# Patient Record
Sex: FEMALE | Race: BLACK OR AFRICAN AMERICAN | NOT HISPANIC OR LATINO
[De-identification: names, ages, dates, MRNs, and addresses within clinical notes are randomized per-mention and may not be internally consistent; named-entity substitution may affect disease eponyms.]

---

## 2021-07-24 ENCOUNTER — FORM ENCOUNTER (OUTPATIENT)
Age: 76
End: 2021-07-24

## 2021-07-24 ENCOUNTER — INPATIENT (INPATIENT)
Facility: HOSPITAL | Age: 76
LOS: 7 days | Discharge: OTHER INSTITUTION | DRG: 54 | End: 2021-08-01
Payer: MEDICARE

## 2021-07-24 VITALS
DIASTOLIC BLOOD PRESSURE: 73 MMHG | OXYGEN SATURATION: 95 % | HEART RATE: 89 BPM | WEIGHT: 175.93 LBS | RESPIRATION RATE: 18 BRPM | SYSTOLIC BLOOD PRESSURE: 134 MMHG

## 2021-07-24 DIAGNOSIS — I26.99 OTHER PULMONARY EMBOLISM WITHOUT ACUTE COR PULMONALE: ICD-10-CM

## 2021-07-24 LAB
ALBUMIN SERPL ELPH-MCNC: 2.9 G/DL — LOW (ref 3.3–5)
ALP SERPL-CCNC: 61 U/L — SIGNIFICANT CHANGE UP (ref 40–120)
ALT FLD-CCNC: 7 U/L — LOW (ref 10–45)
ANION GAP SERPL CALC-SCNC: 12 MMOL/L — SIGNIFICANT CHANGE UP (ref 5–17)
ANISOCYTOSIS BLD QL: SLIGHT — SIGNIFICANT CHANGE UP
APTT BLD: 26.9 SEC — LOW (ref 27.5–35.5)
AST SERPL-CCNC: 16 U/L — SIGNIFICANT CHANGE UP (ref 10–40)
BASOPHILS # BLD AUTO: 0 K/UL — SIGNIFICANT CHANGE UP (ref 0–0.2)
BASOPHILS NFR BLD AUTO: 0 % — SIGNIFICANT CHANGE UP (ref 0–2)
BILIRUB SERPL-MCNC: 0.4 MG/DL — SIGNIFICANT CHANGE UP (ref 0.2–1.2)
BUN SERPL-MCNC: 16 MG/DL — SIGNIFICANT CHANGE UP (ref 7–23)
CALCIUM SERPL-MCNC: 10 MG/DL — SIGNIFICANT CHANGE UP (ref 8.4–10.5)
CHLORIDE SERPL-SCNC: 109 MMOL/L — HIGH (ref 96–108)
CHOLEST SERPL-MCNC: 164 MG/DL — SIGNIFICANT CHANGE UP
CK SERPL-CCNC: 69 U/L — SIGNIFICANT CHANGE UP (ref 25–170)
CO2 SERPL-SCNC: 18 MMOL/L — LOW (ref 22–31)
CREAT SERPL-MCNC: 1.08 MG/DL — SIGNIFICANT CHANGE UP (ref 0.5–1.3)
EOSINOPHIL # BLD AUTO: 0 K/UL — SIGNIFICANT CHANGE UP (ref 0–0.5)
EOSINOPHIL NFR BLD AUTO: 0 % — SIGNIFICANT CHANGE UP (ref 0–6)
GLUCOSE SERPL-MCNC: 125 MG/DL — HIGH (ref 70–99)
HCT VFR BLD CALC: 36.1 % — SIGNIFICANT CHANGE UP (ref 34.5–45)
HDLC SERPL-MCNC: 29 MG/DL — LOW
HGB BLD-MCNC: 11.3 G/DL — LOW (ref 11.5–15.5)
INR BLD: 1.08 — SIGNIFICANT CHANGE UP (ref 0.88–1.16)
LIPID PNL WITH DIRECT LDL SERPL: 92 MG/DL — SIGNIFICANT CHANGE UP
LYMPHOCYTES # BLD AUTO: 1.51 K/UL — SIGNIFICANT CHANGE UP (ref 1–3.3)
LYMPHOCYTES # BLD AUTO: 13.2 % — SIGNIFICANT CHANGE UP (ref 13–44)
MAGNESIUM SERPL-MCNC: 2.2 MG/DL — SIGNIFICANT CHANGE UP (ref 1.6–2.6)
MANUAL SMEAR VERIFICATION: SIGNIFICANT CHANGE UP
MCHC RBC-ENTMCNC: 30 PG — SIGNIFICANT CHANGE UP (ref 27–34)
MCHC RBC-ENTMCNC: 31.3 GM/DL — LOW (ref 32–36)
MCV RBC AUTO: 95.8 FL — SIGNIFICANT CHANGE UP (ref 80–100)
METAMYELOCYTES # FLD: 2.6 % — HIGH (ref 0–0)
MONOCYTES # BLD AUTO: 1.1 K/UL — HIGH (ref 0–0.9)
MONOCYTES NFR BLD AUTO: 9.6 % — SIGNIFICANT CHANGE UP (ref 2–14)
NEUTROPHILS # BLD AUTO: 8.53 K/UL — HIGH (ref 1.8–7.4)
NEUTROPHILS NFR BLD AUTO: 73.7 % — SIGNIFICANT CHANGE UP (ref 43–77)
NEUTS BAND # BLD: 0.9 % — SIGNIFICANT CHANGE UP (ref 0–8)
NON HDL CHOLESTEROL: 135 MG/DL — HIGH
OVALOCYTES BLD QL SMEAR: SLIGHT — SIGNIFICANT CHANGE UP
PHOSPHATE SERPL-MCNC: 3 MG/DL — SIGNIFICANT CHANGE UP (ref 2.5–4.5)
PLAT MORPH BLD: NORMAL — SIGNIFICANT CHANGE UP
PLATELET # BLD AUTO: 182 K/UL — SIGNIFICANT CHANGE UP (ref 150–400)
POLYCHROMASIA BLD QL SMEAR: SLIGHT — SIGNIFICANT CHANGE UP
POTASSIUM SERPL-MCNC: 4.2 MMOL/L — SIGNIFICANT CHANGE UP (ref 3.5–5.3)
POTASSIUM SERPL-SCNC: 4.2 MMOL/L — SIGNIFICANT CHANGE UP (ref 3.5–5.3)
PROT SERPL-MCNC: 6 G/DL — SIGNIFICANT CHANGE UP (ref 6–8.3)
PROTHROM AB SERPL-ACNC: 12.9 SEC — SIGNIFICANT CHANGE UP (ref 10.6–13.6)
RBC # BLD: 3.77 M/UL — LOW (ref 3.8–5.2)
RBC # FLD: 13.8 % — SIGNIFICANT CHANGE UP (ref 10.3–14.5)
RBC BLD AUTO: ABNORMAL
SARS-COV-2 RNA SPEC QL NAA+PROBE: NEGATIVE — SIGNIFICANT CHANGE UP
SODIUM SERPL-SCNC: 139 MMOL/L — SIGNIFICANT CHANGE UP (ref 135–145)
SPHEROCYTES BLD QL SMEAR: SLIGHT — SIGNIFICANT CHANGE UP
TRIGL SERPL-MCNC: 213 MG/DL — HIGH
WBC # BLD: 11.44 K/UL — HIGH (ref 3.8–10.5)
WBC # FLD AUTO: 11.44 K/UL — HIGH (ref 3.8–10.5)

## 2021-07-24 PROCEDURE — 71045 X-RAY EXAM CHEST 1 VIEW: CPT | Mod: 26

## 2021-07-24 PROCEDURE — 70496 CT ANGIOGRAPHY HEAD: CPT | Mod: 26,MC

## 2021-07-24 PROCEDURE — 93010 ELECTROCARDIOGRAM REPORT: CPT

## 2021-07-24 PROCEDURE — 0042T: CPT

## 2021-07-24 PROCEDURE — 99223 1ST HOSP IP/OBS HIGH 75: CPT | Mod: GC

## 2021-07-24 PROCEDURE — 99285 EMERGENCY DEPT VISIT HI MDM: CPT | Mod: 25

## 2021-07-24 PROCEDURE — 71260 CT THORAX DX C+: CPT | Mod: 26,MC

## 2021-07-24 PROCEDURE — 70498 CT ANGIOGRAPHY NECK: CPT | Mod: 26,MC

## 2021-07-24 PROCEDURE — 70450 CT HEAD/BRAIN W/O DYE: CPT | Mod: 26,MC,59

## 2021-07-24 RX ORDER — ENOXAPARIN SODIUM 100 MG/ML
40 INJECTION SUBCUTANEOUS EVERY 24 HOURS
Refills: 0 | Status: DISCONTINUED | OUTPATIENT
Start: 2021-07-25 | End: 2021-07-25

## 2021-07-24 RX ORDER — OXYCODONE HYDROCHLORIDE 5 MG/1
10 TABLET ORAL ONCE
Refills: 0 | Status: DISCONTINUED | OUTPATIENT
Start: 2021-07-24 | End: 2021-07-24

## 2021-07-24 RX ORDER — GABAPENTIN 400 MG/1
1 CAPSULE ORAL
Qty: 0 | Refills: 0 | DISCHARGE

## 2021-07-24 RX ORDER — VALPROIC ACID (AS SODIUM SALT) 250 MG/5ML
500 SOLUTION, ORAL ORAL EVERY 12 HOURS
Refills: 0 | Status: DISCONTINUED | OUTPATIENT
Start: 2021-07-25 | End: 2021-07-25

## 2021-07-24 RX ORDER — ACETAMINOPHEN 500 MG
650 TABLET ORAL EVERY 6 HOURS
Refills: 0 | Status: DISCONTINUED | OUTPATIENT
Start: 2021-07-24 | End: 2021-08-01

## 2021-07-24 RX ORDER — MIRTAZAPINE 45 MG/1
1 TABLET, ORALLY DISINTEGRATING ORAL
Qty: 0 | Refills: 0 | DISCHARGE

## 2021-07-24 RX ORDER — VALPROIC ACID (AS SODIUM SALT) 250 MG/5ML
1500 SOLUTION, ORAL ORAL ONCE
Refills: 0 | Status: COMPLETED | OUTPATIENT
Start: 2021-07-24 | End: 2021-07-24

## 2021-07-24 RX ADMIN — Medication 650 MILLIGRAM(S): at 23:44

## 2021-07-24 RX ADMIN — Medication 32.5 MILLIGRAM(S): at 23:17

## 2021-07-24 RX ADMIN — OXYCODONE HYDROCHLORIDE 10 MILLIGRAM(S): 5 TABLET ORAL at 18:47

## 2021-07-24 RX ADMIN — OXYCODONE HYDROCHLORIDE 10 MILLIGRAM(S): 5 TABLET ORAL at 22:23

## 2021-07-24 NOTE — CONSULT NOTE ADULT - ASSESSMENT
Neuro  #CVA workup  - Continue aspirin 81mg and plavix 75mg daily  - Continue atorvastatin 80mg daily  - q4hr stroke neuro checks and vitals  - obtain MRI Brain with contrast  - Stroke Code HCT Results: No acute ICH or Transcortical infarction  - Stroke Code CTA Results:  - Stroke education  -Ptn is not eligible does not meet the criteria for TPa    #Possible Seizure likely due to brain mets with PMHx of seizures:   - Hold on Keppra  - Valproic Acid 20mg/kg loading dose  - Valproic Acid 500mg q8.   - EEG   Neuro  #CVA workup  - Continue aspirin 81mg and plavix 75mg daily  - Continue atorvastatin 80mg daily  - q4hr stroke neuro checks and vitals  - obtain MRI Brain with contrast  - Stroke Code HCT Results: No acute ICH or Transcortical infarction  - Stroke Code CTA Results:  - Stroke education  - Ptn does not meet the criteria for TPa    #Possible Seizure likely due to brain mets with PMHx of seizures:   - Hold on Keppra  - Valproic Acid 20mg/kg loading dose  - Valproic Acid 500mg q8.   - EEG   Neuro  Assessment: Pt is 75 with 75y Female with PMHx of Melanoma with mets to brain, and multiple organs, HTN, PE on Enoxaparin and seizure on Keppra secondary to brain mets, S/P craniotomy x2(June 2020, July 2021) with acute onset of weakness, stroke code activated, initial NIH 23, CTP and CTA unremarkable for acute ischemia or any LVO, symptoms less likely due to stroke therefore patient is not considered as candidate for TPA. Consider seizure secondary to malignancy mets, no need for stroke neurology evaluation at this time, recommend medicine and general neurology consult.   - Continue full dose Lovenox 40mg qd.   - q4hr general neurology checks and vitals.  - obtain MRI Brain with contrast to evaluate for brain mets.  - Hold on home Keppra dose  - Consider loading patient with Valproic Acid 20mg/kg for epilepsy  - Continue Valproic Acid 500mg q8h s/p loading dose.   - Recommend vEEG Pt is 75 with 75y Female with PMHx of Melanoma with mets to brain, and multiple organs, HTN, PE on Enoxaparin and seizure on Keppra secondary to brain mets, S/P craniotomy x2(June 2020, July 2021) with acute onset of weakness, stroke code activated, initial NIH 16, CTP and CTA unremarkable for acute ischemia or any LVO, symptoms less likely due to stroke therefore patient is not considered as candidate for TPA. Consider seizure secondary to malignancy mets, no need for stroke neurology evaluation at this time, recommend medicine and general neurology consult.     - Continue full dose Lovenox 40mg qd.   - q4hr general neurology checks and vitals.  - obtain MRI Brain with contrast to evaluate for brain mets.  - Hold on home Keppra dose  - Consider loading patient with Valproic Acid 20mg/kg for epilepsy  - Continue Valproic Acid 500mg q8h s/p loading dose.   - Recommend vEEG Pt is 75 with 75y Female with PMHx of Melanoma with mets to brain, and multiple organs, HTN, PE on Enoxaparin and seizure on Keppra secondary to brain mets, S/P craniotomy x2(June 2020, July 2021) with acute onset of weakness, stroke code activated, initial NIH 16, CTP and CTA unremarkable for acute ischemia or any LVO, symptoms less likely due to stroke therefore patient is not considered as candidate for TPA. Consider seizure secondary to malignancy mets, no need for stroke neurology evaluation at this time, recommend medicine and general neurology consult.     - Continue full dose Lovenox 40mg qd.   - q8hr general neurology checks and vitals.  - obtain MRI Brain with contrast to evaluate for brain mets.  - Hold on home Keppra dose  - Consider loading patient with Valproic Acid 20mg/kg for epilepsy  - Continue Valproic Acid 500mg q8h s/p loading dose.   - Recommend vEEG

## 2021-07-24 NOTE — H&P ADULT - PROBLEM SELECTOR PLAN 8
F: none  E: replete to K>4, Mg>2, Phos>2.5  N: Full liquids for now, Speech and swallow eval  Ppx: Lovenox    Code Status: Full, Emergency Contact: [phone], Daughter (Victor Hugo)    Dispo: [facility]

## 2021-07-24 NOTE — ED ADULT NURSE NOTE - INTERVENTIONS DEFINITIONS
Physically safe environment: no spills, clutter or unnecessary equipment/Provide visual cue, wrist band, yellow gown, etc.

## 2021-07-24 NOTE — ED ADULT TRIAGE NOTE - CHIEF COMPLAINT QUOTE
Patient brought in as notification for R/O stroke. Per daughter, patient was last seen at baseline at 8pm last night by her sister. Daughter reports today when she arrived to visit patient, she found patient to have left side weakness, slurred speech and left side facial droop Patient brought in as notification for R/O stroke. Per daughter, patient was last seen at baseline at 8pm last night by daughter. Daughter reports today when she arrived to visit patient, she found patient to have left side weakness, slurred speech and left side facial droop

## 2021-07-24 NOTE — ED ADULT NURSE NOTE - OBJECTIVE STATEMENT
75y female BIBA as notification stroke code. Pt accompanied by daughter. as per daughter, pt last known normal was @8pm last night. daughter went to go see mother today and noted slurred speech and R sided weakness. as per EMS, pt had L sided weakness when they saw pt? In ED, pt is A&0 x 0-1? did not state, name, month or year. states, "am I at [facility]?" As per daughter, pt has L sided weakness @ basline. hx of seizures, brain mets, 2 crainotomys.  Delay in CTA due to inability to obtain Iv access. ultrasound guided Iv obtained by Dr. Guillen @17:00. pt began to complain of CP and R Leg pain @16:45. Bilateral BP's L arm 134/74, 75y female BIBA as notification stroke code. Pt accompanied by daughter. as per daughter, pt last known normal was @8pm last night. daughter went to go see mother today and noted slurred speech and R sided weakness. as per EMS, pt had L sided weakness when they saw pt? In ED, pt is A&0 x 0-1? did not state, name, month or year. states, "am I at [facility]?" As per daughter, pt has L sided weakness @ baseline. hx of seizures, brain mets, 2 craniotomy's.  Delay in CTA due to inability to obtain IV access. ultrasound guided IV obtained by Dr. Guillen @17:00. pt began to complain of CP and R Leg pain @16:45. Bilateral BP's L arm 134/74, R Arm 114/68.

## 2021-07-24 NOTE — H&P ADULT - NSHPLABSRESULTS_GEN_ALL_CORE
LABS:                        11.3   11.44 )-----------( 182      ( 24 Jul 2021 17:03 )             36.1     07-24    139  |  109<H>  |  16  ----------------------------<  125<H>  4.2   |  18<L>  |  1.08    Ca    10.0      24 Jul 2021 17:03  Phos  3.0     07-24  Mg     2.2     07-24    TPro  6.0  /  Alb  2.9<L>  /  TBili  0.4  /  DBili  x   /  AST  16  /  ALT  7<L>  /  AlkPhos  61  07-24      PT/INR - ( 24 Jul 2021 17:03 )   PT: 12.9 sec;   INR: 1.08          PTT - ( 24 Jul 2021 17:03 )  PTT:26.9 sec  CAPILLARY BLOOD GLUCOSE  95 (24 Jul 2021 19:33)      POCT Blood Glucose.: 95 mg/dL (24 Jul 2021 16:29)      CARDIAC MARKERS ( 24 Jul 2021 17:03 )  x     / 0.03 ng/mL / 69 U/L / x     / x                          I & O Summary:      Microbiology:        RADIOLOGY, EKG AND ADDITIONAL TESTS: Reviewed. None

## 2021-07-24 NOTE — ED PROVIDER NOTE - CLINICAL SUMMARY MEDICAL DECISION MAKING FREE TEXT BOX
Pt with metastatic melanoma  - presents from NH for possible stroke: confusion, ? one sided weakness, neglect - code stroke called upon arrival but no evidence of acute stroke on imaging.  STROKE team feels possible seizures ? vs generalized weakness. Pt with pain in multiple different areas secondary to metastatic disease requiring oxycodone in ED.  No evidence of infection.  As per stroke, feel pt would be better managed on medicine team vs stroke team for general neuro consult for seizure rule out and for further pain management.

## 2021-07-24 NOTE — H&P ADULT - PROBLEM SELECTOR PLAN 7
F: none  E: replete to K>4, Mg>2, Phos>2.5  N: Full liquids for now, Speech and swallow eval  Ppx: Lovenox    Code Status: Full, Emergency Contact: [phone], Daughter (Victor Hugo)    Dispo: [facility] Home med: Amlodipine 5mg PO, labetalol 100mg po BID  Blood pressure on the lower end   Plan:   - HOLD amlodipine 5mg PO daily  - HOLD labetalol 100mg po BID

## 2021-07-24 NOTE — H&P ADULT - NSHPPHYSICALEXAM_GEN_ALL_CORE
VITAL SIGNS:  T(C): 36.8 (07-24-21 @ 17:42), Max: 36.8 (07-24-21 @ 17:42)  HR: 105 (07-24-21 @ 17:42) (65 - 105)  BP: 140/72 (07-24-21 @ 17:42) (121/90 - 140/72)  RR: 20 (07-24-21 @ 17:42) (18 - 20)  SpO2: 97% (07-24-21 @ 17:42) (95% - 97%)    PHYSICAL EXAM:  Constitutional: WDWN, comfortable in bed; NAD  Neurologic: AAOx3; CNII-XII grossly intact; no focal deficits  HEENT: NC/AT; clear conjunctiva, anicteric sclera; no nasal discharge; no oropharyngeal erythema or exudates, MMM  Neck: supple; no JVD or thyromegaly, no cervical, post-auricular or supraclavicular lymphadenopathy  Respiratory: CTA B/L, no wheezes/rales/rhonchi, no diminished breath sounds, no increased work of breathing or accessory muscle use  Cardiac: +S1/S2, no murmurs/rubs/gallops, RRR  Gastrointestinal: abdomen soft, NT/ND; no rebound or guarding, no hepatosplenomegaly; +BSx4  Genitourinary: no suprapubic tenderness, no CVA tenderness b/l  Back: spine midline, no bony tenderness or step-offs  Extremities: WWP, no clubbing or cyanosis; no peripheral edema b/l  Musculoskeletal: NROM x4; no joint swelling, tenderness or erythema  Vascular: 2+ radial, femoral, DP/PT pulses B/L  Dermatologic: skin warm, dry and intact; no rashes, wounds, or scars  Lymphatic: no submandibular or cervical LAD  Psychiatric: affect and characteristics of appearance, verbalizations, behaviors are appropriate VITAL SIGNS:  T(C): 36.8 (07-24-21 @ 17:42), Max: 36.8 (07-24-21 @ 17:42)  HR: 105 (07-24-21 @ 17:42) (65 - 105)  BP: 140/72 (07-24-21 @ 17:42) (121/90 - 140/72)  RR: 20 (07-24-21 @ 17:42) (18 - 20)  SpO2: 97% (07-24-21 @ 17:42) (95% - 97%)    PHYSICAL EXAM:  Constitutional: Elderly lady sleeping responds to her name and painful stimuli, protecting her airways  Neurologic: AAOx1; unable to assess, would refer to last Neuro note as the recent exam   HEENT: NC/AT; clear conjunctiva, anicteric sclera; no nasal discharge; no oropharyngeal erythema or exudates, MMM, crusted lesions noted on the forehead  Neck: supple; no JVD or thyromegaly, no cervical, post-auricular or supraclavicular lymphadenopathy  Respiratory: CTA B/L, no wheezes/rales/rhonchi, no diminished breath sounds, no increased work of breathing or accessory muscle use  Cardiac: +S1/S2, no murmurs/rubs/gallops, RRR  Gastrointestinal: abdomen soft, NT/ND; no rebound or guarding, no hepatosplenomegaly; +BSx4  Genitourinary: no suprapubic tenderness, no CVA tenderness b/l  Back: spine midline, no bony tenderness or step-offs  Extremities: WWP, no clubbing or cyanosis; no peripheral edema b/l  Musculoskeletal: NROM x4; no joint swelling, tenderness or erythema  Vascular: 2+ radial, femoral, DP/PT pulses B/L  Dermatologic: skin warm, dry and intact; no rashes, wounds, or scars  Lymphatic: no submandibular or cervical LAD  Psychiatric: affect and characteristics of appearance, verbalizations, behaviors are appropriate

## 2021-07-24 NOTE — H&P ADULT - HISTORY OF PRESENT ILLNESS
Patient Isabella Horowitz is a 75y old Female with PMHx of Melanoma with mets to brain, spine  and multiple organs, HTN, PE (on Enoxaparin), seizure on Keppra secondary to brain mets, S/P craniotomy x2(June 2020, July 2021), shingles (recently treated at [facility]), chronic back pain 2/2 mets who presented to the ED brought in by her daughter with concerns of left side weakness, altered mental sttus and left side facial droop. Daughter got concerned and stated that she brought her to the hospital from [facility] where she was discharged few days ago from [facility]. As per the daughter who was present on the bed side stated that her mother looked ]    iced the patient declined from her baseline and experiencing slurred speech with left facial droop and left sided weakness.   On arrival patient with R hemineglect with head turned toward left, NIHSS 16, she had altered level of consciousness facial palsy, left arm and B/L LE weakness, mild-mod loss of sensation and R hemineglect.   Pt is accompanied by her daughter, she stated her mother has L sided weakness at her baseline.   In ED patient complained of atypical chest pain, B/L LE pain, R hip pain.   History is limited, pending patient's daughter to bring documents from [facility].      Patient Isabella Horowitz is a 75y old Female with PMHx of Melanoma with mets to brain, spine  and multiple organs, HTN, PE (on Enoxaparin), seizure on Keppra secondary to brain mets, S/P craniotomy x2(June 2020, July 2021), shingles (recently treated at [facility]), chronic back pain 2/2 mets who presented to the ED brought in by her daughter with concerns of left side weakness, altered mental status and left side facial droop. Daughter got concerned and stated that she brought her to the hospital from [facility] where she was discharged few days ago from [facility]. As per the daughter who was present on the bed side stated that her mother looked different to her from her baseline which is she is able to talk and understand things going around her. Stroke called was called as per the chart review, on arrival patient with R hemineglect with head turned toward left, NIHSS 16, she had altered level of consciousness facial palsy, left arm and B/L LE weakness, mild-mod loss of sensation and R hemineglect. As per the daughter her mother recently back in April was found to have seizures with no jerking movement noticed and she went silent and started to stare the wall for few seconds, with no post-ictal confusion, tongue biting, loss of consciousness, bowel or bladder incontinence and she took the patient to [facility] where her neurologist diagnosed her with seizures and started her on Keppra. Otherwise ROS was limited due to patients medical condition as she was AAO x1 during my encounter in the ED    ED Course  Vitals: T:98.3 F  HR: 89- 105/min    BP: 125-140/90-72    RR: 20/min    SPO2: 97% on RA  Labs s/f: Sodium, Serum: 139, Potassium: 4.2, GFR if : 59, WBC Count: 11.44    Imaging: CT Chest w/ IV Cont : There is pulmonary metastatic disease and there are metastases involving left supraclavicular and bilateral axillary lymph nodes, in addition to soft tissue metastases in the shoulder musculature bilaterally.Low-density lesion right lobe of liver adjacent to infiltration of subcutaneous fat in the right lateral chest wall.Multinodular thyroid.Small right pleural effusion.  CT Head:  No acute intracranial hemorrhage or transcortical infarction. Status post right frontal craniotomy with subjacent right frontal lobe encephalomalacic changes.  CT Angio Neck w/ IV Cont: Multiple pulmonary metastases, Multiple soft tissue metastases, No acute vascular abnormality  EKG: NSR  ED Interventions: Oxycodone 10mg PO for back pain

## 2021-07-24 NOTE — ED ADULT NURSE REASSESSMENT NOTE - NS ED NURSE REASSESS COMMENT FT1
Po tylenol given for pain. pt passed water trial before receiving tylenol. pt had difficulty swallowing pills. Tylenol dissolving in mouth. team made aware that pt should be NPO at this time. VSS

## 2021-07-24 NOTE — ED ADULT NURSE REASSESSMENT NOTE - NS ED NURSE REASSESS COMMENT FT1
after initial report was give, pt bed was reassigned due to concern for shingles 2 weeks ago. bed currently unassigned. pt receiving consult @ bedside. VSS, see flow sheet. warm blanket provided. no open lesions noted.

## 2021-07-24 NOTE — H&P ADULT - ASSESSMENT
Patient Isabella Horowitz is a 75y old Female with PMHx of Melanoma with mets to brain, spine  and multiple organs, HTN, PE (on Enoxaparin), seizure on Keppra secondary to brain mets, S/P craniotomy x2(June 2020, July 2021), shingles (recently treated at [facility]), chronic back pain 2/2 mets who presented to the ED brought in by her daughter with concerns of left side weakness, altered mental status and left side facial droop. Given patients history of seizure recently possibly due to mets the presentation of the patient is possibly due to seizures, as stroke workup was done and is less likely the cause, however will need to follow up with MRI. Also her presentation could be due to polypharmacy as patient is on multiple medications for pain and anxiolytics. Patient is being admitted for monitoring and further management.     Patient Isabella Horowitz is a 75y old Female with PMHx of Melanoma with mets to brain, spine  and multiple organs, HTN, PE (on Enoxaparin), seizure on Keppra secondary to brain mets, S/P craniotomy x2(June 2020, July 2021), shingles (recently treated at [facility]), chronic back pain 2/2 mets who presented to the ED brought in by her daughter with concerns of left side weakness, altered mental status and left side facial droop. Given patients history of seizure recently possibly due to mets the presentation of the patient is possibly due to seizures, as stroke workup was done and is less likely the cause, however will need to follow up with MRI. Also her presentation could be due to polypharmacy as patient is on multiple medications for pain and anxiolytics. Patient is being admitted for monitoring and further management.

## 2021-07-24 NOTE — ED ADULT NURSE REASSESSMENT NOTE - NS ED NURSE REASSESS COMMENT FT1
pt daughter states, "im just upset that neurology saw her when I wasn't here and while she was asleep. I don't understand how they could get a real assessment on her like that." daughter reports she is frustrated waiting for the next step to get upstairs. daughter made aware of bed status delayed due to concern for shingles by [facility] nurse. depacon started. pt VSS. pt reports she is not in pain.

## 2021-07-24 NOTE — ED PROVIDER NOTE - NEUROLOGICAL, MLM
AAO x 0, no facial droop, mild expressive aphasia, b/l lower extremity weakness, right sided neglect, decreased sensation b/l

## 2021-07-24 NOTE — H&P ADULT - PROBLEM SELECTOR PLAN 5
and Depression  Home med: Mirtazepine 15mg PO qhs   Plan:   - c/w Mirtazepine 15mg PO qhs Home Med: Ramelteon 8mg QHS  Plan:   - c/w Ramelteon 8mg po qhs

## 2021-07-24 NOTE — H&P ADULT - PROBLEM SELECTOR PLAN 3
Recent history of PE  Home med: Lovenox 40mg SQ daily  Plan:   - c/w Lovenox 40mg SQ daily in the setting of mets to the spine  Home made: Oxycodone 10mg q4 PRN, gabapentin, fentanyl patch   Plan:   - would decrease the dose for now to Oxycodone 5mg PO q4 PRN given patient current condition  - c/w gabapentin 100mg BID PO   - Hold off on fentanyl patch for now   - Patient was given Tizanidine and Flexeril at [facility], would not start for now and monitor for now in the setting of mets to the spine  Home made: Oxycodone 10mg q4 PRN, gabapentin, fentanyl patch   Plan:   - would decrease the dose for now to Oxycodone 5mg PO q4 PRN given patient current condition, as patient is not able to swallow meds would give morphine 2mg IV q4 PRN for pain   - c/w gabapentin 100mg BID PO   - Hold off on fentanyl patch for now   - Patient was given Tizanidine and Flexeril at [facility], would not start for now and monitor for now in the setting of mets to the spine  Home made: Oxycodone 10mg q4 PRN, gabapentin, fentanyl patch   Plan:   - would decrease the dose for now to Oxycodone 5mg PO q4 PRN given patient current condition, as patient is not able to swallow meds would give morphine 2mg IV q4 PRN for pain   - c/w gabapentin 100mg BID PO   - Hold off on fentanyl patch for now   - Patient was given Tizanidine and Flexeril at [facility], would not start for now and monitor for now  - Palliative consult in AM Recent history of PE  Home med: Lovenox 40mg SQ daily  Plan:   - c/w Lovenox 40mg SQ daily    ADDENDUM: weight based lovenox dosing in setting of PE hx

## 2021-07-24 NOTE — ED ADULT NURSE NOTE - CHIEF COMPLAINT QUOTE
Patient brought in as notification for R/O stroke. Per daughter, patient was last seen at baseline at 8pm last night by her sister. Daughter reports today when she arrived to visit patient, she found patient to have left side weakness, slurred speech and left side facial droop

## 2021-07-24 NOTE — ED PROVIDER NOTE - OBJECTIVE STATEMENT
75y Female with PMHx of Melanoma with mets to brain, and multiple organs, HTN, PE on Enoxaparin and seizure secondary to brain mets, S/P craniotomy on June who presented to the ED with CC of left side weakness, slurred speech and left side facial droop.   Patient last known well time is 8pm July 23rd which her daughter visited her at nursing home. Today morning her daughter noticed the patient declined from her baseline and experiencing slurred speech with left facial droop and left sided weakness.   On arrival patient with R hemineglect with head turned toward left, NIHSS 23, she had altered level of consciousness facial palsy, left arm and B/L LE weakness, mild-mod loss of sensation and R hemineglect.   Pt is accompanied by her daughter, she stated her mother has L sided weakness at her baseline.   In ED patient complained of atypical chest pain, B/L LE pain, R hip pain.   History is limited, pending patient's daughter to bring documents from [facility].   CODE STROKE called prior to arrival. 75y Female with PMHx of Melanoma with mets to brain, and multiple organs, HTN, PE on Enoxaparin and seizure secondary to brain mets, S/P craniotomy on June who presented to the ED with CC of left side weakness, slurred speech and left side facial droop.   Patient last known well time is 8pm July 23rd which her daughter visited her at nursing home. Today morning her daughter noticed the patient declined from her baseline and experiencing slurred speech with left facial droop and left sided weakness.   On arrival patient with R hemineglect with head turned toward left, NIHSS 23, she had altered level of consciousness facial palsy, left arm and B/L LE weakness, mild-mod loss of sensation and R hemineglect.   Pt is accompanied by her daughter, she stated her mother has L sided weakness at her baseline.   In ED patient complained of atypical chest pain, B/L LE pain, R hip pain.   History is limited, pending patient's daughter to bring documents from [facility].   CODE STROKE called prior to arrival.  Pt recently admitted to [facility] for shingles requiring iv valtrex and then became generally weaker [facility]  Pt now presents from NH after being admitted < 24 hours.  Pt full code.

## 2021-07-24 NOTE — H&P ADULT - PROBLEM SELECTOR PLAN 2
possibly in the setting of metastasis to brain 2/2 melanoma; would r/o acute CVA; less likely metabolic encephalopathy   VSS  Mild Leucocytosis around 11 Recent history of PE  Home med: Lovenox 40mg SQ daily  Plan:   - c/w Lovenox 40mg SQ daily ADDENDUM:  pt w/ metastatic melanoma at multiple sites; poor prognosis, will likely need palliative consult, GOC discussion. obtain prior workup and treatment from [facility]

## 2021-07-24 NOTE — H&P ADULT - PROBLEM SELECTOR PLAN 4
in the setting of mets to the spine  Home made: Oxycodone 10mg q4 PRN, Home Med: Ramelteon 8mg QHS  Plan:   - c/w Ramelteon 8mg po qhs in the setting of mets to the spine  Home made: Oxycodone 10mg q4 PRN, gabapentin, fentanyl patch   Plan:   - would decrease the dose for now to Oxycodone 5mg PO q4 PRN given patient current condition, as patient is not able to swallow meds would give morphine 2mg IV q4 PRN for pain   - c/w gabapentin 100mg BID PO   - Hold off on fentanyl patch for now   - Patient was given Tizanidine and Flexeril at [facility], would not start for now and monitor for now  - Palliative consult in AM    ADDENDUM: monitor for opioid w/d , pt failed bedside dysphagia, keep NPO, transition PO oxycodone to morphine IV prn for now

## 2021-07-24 NOTE — H&P ADULT - PROBLEM SELECTOR PLAN 1
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 Multinodular thyroid.  CT Head:  No acute intracranial hemorrhage or transcortical infarction. Status post right frontal craniotomy with subjacent right frontal lobe encephalomalacic changes.  Plan:   - q4hr general neurology checks and vitals.  - f/u MRI Brain with contrast to evaluate for brain mets.  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 Multinodular thyroid.  CT Head:  No acute intracranial hemorrhage or transcortical infarction. Status post right frontal craniotomy with subjacent right frontal lobe encephalomalacic changes.  Plan:   - q4hr general neurology checks and vitals.  - f/u MRI Brain with contrast to evaluate for brain mets.  - Hold on home Keppra dose  - Loading dose of valproic acid 1500mg ordered  - c/w Valproic acid 500mg Q12   - f/u vEEG  - discussed with On call epileptologist  - Neuro consulted, f/u recs  - STAT CTH if change in mentation     ADDENDUM: obtain UA

## 2021-07-24 NOTE — H&P ADULT - PROBLEM SELECTOR PLAN 6
Home med: Amlodipine 5mg PO, labetalol 100mg po BID  Plan:   - c/w amlodipine 5mg PO daily  - c/w labetalol 100mg po BID Home med: Amlodipine 5mg PO, labetalol 100mg po BID  Blood pressure on the lower end   Plan:   - HOLD amlodipine 5mg PO daily  - HOLD labetalol 100mg po BID and Depression  Home med: Mirtazepine 15mg PO qhs   Plan:   - c/w Mirtazepine 15mg PO qhs

## 2021-07-24 NOTE — H&P ADULT - ATTENDING COMMENTS
reviewed pertinent data , h&p  patient seen and examined overnight     PE as above w/ following exceptions/ additions:       1.        rest of  plan as above reviewed pertinent data , h&p  patient seen and examined overnight     PE as above w/ following exceptions/ additions: elderly female asleep , awoken, nonverbal but moaning, minimally follows commands, perrla, mmm, s1s2, lungs CTA anteriorly b/l , abdomen soft/nt/nd, pt able to perform handgrip, raise arms mininally, able to raise left leg, unable to raise right leg       1. AMS: pt w/ worsening mental status symptoms in setting of metastatic melanoma. pending further neuro evaluation w/ MRI brain. keppra held and started on valproic acid. VEEG pending. will need palliative consult for GOC discussion. Of note, pt w/ fevers, worsening hypotension in AM requiring broad spectrum abx, blood ctxs, fluids, low threshold for ICU consult given code status.        rest of  plan as above

## 2021-07-24 NOTE — H&P ADULT - NSICDXPASTMEDICALHX_GEN_ALL_CORE_FT
PAST MEDICAL HISTORY:  Hypertension     Melanoma     Pulmonary embolism      PAST MEDICAL HISTORY:  Chronic back pain     Hypertension     Melanoma     Pulmonary embolism     Seizures     Shingles

## 2021-07-24 NOTE — CONSULT NOTE ADULT - SUBJECTIVE AND OBJECTIVE BOX
**STROKE CODE CONSULT NOTE**    Last known well time/Time of onset of symptoms: 8PM July 23rd.     HPI: 75y Female with PMHx of     T(C): 36.8 (07-24-21 @ 17:42), Max: 36.8 (07-24-21 @ 17:42)  HR: 105 (07-24-21 @ 17:42) (65 - 105)  BP: 140/72 (07-24-21 @ 17:42) (121/90 - 140/72)  RR: 20 (07-24-21 @ 17:42) (18 - 20)  SpO2: 97% (07-24-21 @ 17:42) (95% - 97%)    PAST MEDICAL & SURGICAL HISTORY:      FAMILY HISTORY:      SOCIAL HISTORY:   Patient lives with *** at ***.   Smoking status:  Drinking:  Drug Use:     ROS: ***  Constitutional: No fever, weight loss or fatigue  Eyes: No eye pain, visual disturbances, or discharge  ENMT:  No difficulty hearing, tinnitus; No sinus or throat pain  Neck: No pain or stiffness  Respiratory: No cough, wheezing, chills or hemoptysis  Cardiovascular: No chest pain, palpitations, shortness of breath, or leg swelling  Gastrointestinal: No abdominal pain. No nausea, vomiting or hematemesis; No diarrhea or constipation. Nohematochezia.  Genitourinary: No dysuria, frequency, hematuria or incontinence  Neurological: As per HPI  Skin: No itching, burning, rashes or lesions   Endocrine: No heat or cold intolerance; No hair loss  Musculoskeletal: No joint pain or swelling; No muscle, back or extremity pain  Heme/Lymph: No easy bruising or bleeding gums    MEDICATIONS  (STANDING):    MEDICATIONS  (PRN):    Allergies    crawfish (Anaphylaxis)  Keflex (Anaphylaxis)  Kiwi (Anaphylaxis)  penicillin (Anaphylaxis)  tetracycline (Anaphylaxis)    Intolerances      Vital Signs Last 24 Hrs  T(C): 36.8 (24 Jul 2021 17:42), Max: 36.8 (24 Jul 2021 17:42)  T(F): 98.3 (24 Jul 2021 17:42), Max: 98.3 (24 Jul 2021 17:42)  HR: 105 (24 Jul 2021 17:42) (65 - 105)  BP: 140/72 (24 Jul 2021 17:42) (121/90 - 140/72)  BP(mean): --  RR: 20 (24 Jul 2021 17:42) (18 - 20)  SpO2: 97% (24 Jul 2021 17:42) (95% - 97%)    Physical exam:  Constitutional: No acute distress, conversant  Eyes: Anicteric sclerae, moist conjunctivae, see below for CNs  Neck: trachea midline, FROM, supple, no thyromegaly or lymphadenopathy  Cardiovascular: Regular rate and rhythm, no murmurs, rubs, or gallops. No carotid bruits.   Pulmonary: Anterior breath sounds clear bilaterally, no crackles or wheezing. No use of accessory muscles  GI: Abdomen soft, non-distended, non-tender  Extremities: Radial and DP pulses +2, no edema    Neurologic:  -Mental status: Awake, alert, oriented to person, place, and time. Speech is fluent with intact naming, repetition, and comprehension, no dysarthria. Recent and remote memory intact. Follows commands. Attention/concentration intact. Fund of knowledge appropriate.  -Cranial nerves:   II: Visual fields are full to confrontation.  III, IV, VI: Extraocular movements are intact without nystagmus. Pupils equally round and reactive to light  V:  Facial sensation V1-V3 equal and intact   VII: Face is symmetric with normal eye closure and smile  VIII: Hearing is bilaterally intact to finger rub  IX, X: Uvula is midline and soft palate rises symmetrically  XI: Head turning and shoulder shrug are intact.  XII: Tongue protrudes midline  Motor: Normal bulk and tone. No pronator drift. Strength bilateral upper extremity 5/5, bilateral lower extremities 5/5.  Rapid alternating movements intact and symmetric  Sensation: Intact to light touch bilaterally. No neglect or extinction on double simultaneous testing.  Coordination: No dysmetria on finger-to-nose and heel-to-shin bilaterally  Reflexes: Downgoing toes bilaterally   Gait: Narrow gait and steady    NIHSS: **** ASPECT Score: ***** ICH Score: ****** (GCS)    Fingerstick Blood Glucose: CAPILLARY BLOOD GLUCOSE      POCT Blood Glucose.: 95 mg/dL (24 Jul 2021 16:29)    LABS:                        11.3   11.44 )-----------( 182      ( 24 Jul 2021 17:03 )             36.1     07-24    139  |  109<H>  |  16  ----------------------------<  125<H>  4.2   |  18<L>  |  1.08    Ca    10.0      24 Jul 2021 17:03    TPro  6.0  /  Alb  2.9<L>  /  TBili  0.4  /  DBili  x   /  AST  16  /  ALT  7<L>  /  AlkPhos  61  07-24    PT/INR - ( 24 Jul 2021 17:03 )   PT: 12.9 sec;   INR: 1.08          PTT - ( 24 Jul 2021 17:03 )  PTT:26.9 sec  CARDIAC MARKERS ( 24 Jul 2021 17:03 )  x     / 0.03 ng/mL / x     / x     / x              RADIOLOGY & ADDITIONAL STUDIES:      -----------------------------------------------------------------------------------------------------------------  IV-tPA (Y/N):    ***                              Bolus time:    Alteplase Dose Verification w/ RN:  Reason IV-tPA not given: ***    **STROKE CODE CONSULT NOTE**    Last known well time/Time of onset of symptoms: 8PM July 23rd.     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   Patient last known well time is 8pm July 23rd which her daughter visited her at nursing home. Today morning her daughter noticed the patient declined from her baseline and experiencing slurred speech with left facial droop and left sided weakness.   On arrival patient with R hemineglect with head turned toward left, NIHSS 23, she had altered level of consciousness facial palsy, left arm and B/L LE weakness, mild-mod loss of sensation and R hemineglect.   Pt is accompanied by her daughter, she stated her mother has L sided weakness at her baseline.   In ED patient complained of atypical chest pain, B/L LE pain, R hip pain.   History is limited, pending patient's daughter to bring documents from [facility].     T(C): 36.8 (07-24-21 @ 17:42), Max: 36.8 (07-24-21 @ 17:42)  HR: 105 (07-24-21 @ 17:42) (65 - 105)  BP: 140/72 (07-24-21 @ 17:42) (121/90 - 140/72)  RR: 20 (07-24-21 @ 17:42) (18 - 20)  SpO2: 97% (07-24-21 @ 17:42) (95% - 97%)    PAST MEDICAL & SURGICAL HISTORY:  Melanoma with mets.   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    Smoking status:  Drinking:  Drug Use:     ROS: ***  Constitutional: Generalized pain mostly over LE B/L. No fever, weight loss.   Eyes: vision impairment.   ENMT:  No difficulty hearing, tinnitus; No sinus or throat pain  Neck: No pain or stiffness  Respiratory: No cough, wheezing, chills or hemoptysis  Cardiovascular: Mild chest pain, B/L leg swelling R>L, no palpitations, shortness of breath.   Gastrointestinal: No abdominal pain. No nausea, vomiting or hematemesis; No diarrhea or constipation. Nohematochezia.  Genitourinary: No dysuria, frequency, hematuria or incontinence  Neurological: As per HPI  Skin: No itching, burning, rashes or lesions   Endocrine: No heat or cold intolerance; No hair loss  Musculoskeletal: multifocal muscle, back or extremity pain  Heme/Lymph: No easy bruising or bleeding gums    MEDICATIONS  (STANDING):  Home Meds:     MEDICATIONS  (PRN):    Allergies    crawfish (Anaphylaxis)  Keflex (Anaphylaxis)  Kiwi (Anaphylaxis)  penicillin (Anaphylaxis)  tetracycline (Anaphylaxis)    Intolerances      Vital Signs Last 24 Hrs  T(C): 36.8 (24 Jul 2021 17:42), Max: 36.8 (24 Jul 2021 17:42)  T(F): 98.3 (24 Jul 2021 17:42), Max: 98.3 (24 Jul 2021 17:42)  HR: 105 (24 Jul 2021 17:42) (65 - 105)  BP: 140/72 (24 Jul 2021 17:42) (121/90 - 140/72)  BP(mean): --  RR: 20 (24 Jul 2021 17:42) (18 - 20)  SpO2: 97% (24 Jul 2021 17:42) (95% - 97%)    Physical exam:  Constitutional: No acute distress, conversant  Eyes: Anicteric sclerae, moist conjunctivae, see below for CNs  Neck: trachea midline, FROM, supple, no thyromegaly or lymphadenopathy  Cardiovascular: Regular rate and rhythm, no murmurs, rubs, or gallops. No carotid bruits.   Pulmonary: Anterior breath sounds clear bilaterally, no crackles or wheezing. No use of accessory muscles  GI: Abdomen soft, non-distended, non-tender  Extremities: Radial and DP pulses +2, no edema    Neurologic:  -Mental status: Awake, alert, oriented to person, place, and time. Speech is fluent with intact naming, repetition, and comprehension, no dysarthria. Recent and remote memory intact. Follows commands. Attention/concentration intact. Fund of knowledge appropriate.  -Cranial nerves:   II: Visual fields are full to confrontation.  III, IV, VI: Extraocular movements are intact without nystagmus. Pupils equally round and reactive to light  V:  Facial sensation V1-V3 equal and intact   VII: Face is symmetric with normal eye closure and smile  VIII: Hearing is bilaterally intact to finger rub  IX, X: Uvula is midline and soft palate rises symmetrically  XI: Head turning and shoulder shrug are intact.  XII: Tongue protrudes midline  Motor: Normal bulk and tone. No pronator drift. Strength bilateral upper extremity 5/5, bilateral lower extremities 5/5.  Rapid alternating movements intact and symmetric  Sensation: Intact to light touch bilaterally. No neglect or extinction on double simultaneous testing.  Coordination: No dysmetria on finger-to-nose and heel-to-shin bilaterally  Reflexes: Downgoing toes bilaterally   Gait: Narrow gait and steady    NIHSS: **** ASPECT Score: ***** ICH Score: ****** (GCS)    Fingerstick Blood Glucose: CAPILLARY BLOOD GLUCOSE      POCT Blood Glucose.: 95 mg/dL (24 Jul 2021 16:29)    LABS:                        11.3   11.44 )-----------( 182      ( 24 Jul 2021 17:03 )             36.1     07-24    139  |  109<H>  |  16  ----------------------------<  125<H>  4.2   |  18<L>  |  1.08    Ca    10.0      24 Jul 2021 17:03    TPro  6.0  /  Alb  2.9<L>  /  TBili  0.4  /  DBili  x   /  AST  16  /  ALT  7<L>  /  AlkPhos  61  07-24    PT/INR - ( 24 Jul 2021 17:03 )   PT: 12.9 sec;   INR: 1.08          PTT - ( 24 Jul 2021 17:03 )  PTT:26.9 sec  CARDIAC MARKERS ( 24 Jul 2021 17:03 )  x     / 0.03 ng/mL / x     / x     / x              RADIOLOGY & ADDITIONAL STUDIES:      -----------------------------------------------------------------------------------------------------------------  IV-tPA (Y/N):    ***                              Bolus time:    Alteplase Dose Verification w/ RN:  Reason IV-tPA not given: ***    **STROKE CODE CONSULT NOTE**    Last known well time/Time of onset of symptoms: 8PM July 23rd.     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   Patient last known well time is 8pm July 23rd which her daughter visited her at nursing home. Today morning her daughter noticed the patient declined from her baseline and experiencing slurred speech with left facial droop and left sided weakness.   On arrival patient with R hemineglect with head turned toward left, NIHSS 23, she had altered level of consciousness facial palsy, left arm and B/L LE weakness, mild-mod loss of sensation and R hemineglect.   Pt is accompanied by her daughter, she stated her mother has L sided weakness at her baseline.   In ED patient complained of atypical chest pain, B/L LE pain, R hip pain.   History is limited, pending patient's daughter to bring documents from [facility].     T(C): 36.8 (07-24-21 @ 17:42), Max: 36.8 (07-24-21 @ 17:42)  HR: 105 (07-24-21 @ 17:42) (65 - 105)  BP: 140/72 (07-24-21 @ 17:42) (121/90 - 140/72)  RR: 20 (07-24-21 @ 17:42) (18 - 20)  SpO2: 97% (07-24-21 @ 17:42) (95% - 97%)    PAST MEDICAL & SURGICAL HISTORY:  Melanoma with mets.   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    Smoking status:  Drinking:  Drug Use:     ROS: ***  Constitutional: Generalized pain mostly over LE B/L. No fever, weight loss.   Eyes: vision impairment.   ENMT:  No difficulty hearing, tinnitus; No sinus or throat pain  Neck: No pain or stiffness  Respiratory: No cough, wheezing, chills or hemoptysis  Cardiovascular: Mild chest pain, B/L leg swelling R>L, no palpitations, shortness of breath.   Gastrointestinal: No abdominal pain. No nausea, vomiting or hematemesis; No diarrhea or constipation. Nohematochezia.  Genitourinary: No dysuria, frequency, hematuria or incontinence  Neurological: As per HPI  Skin: No itching, burning, rashes or lesions   Endocrine: No heat or cold intolerance; No hair loss  Musculoskeletal: multifocal muscle, back or extremity pain  Heme/Lymph: No easy bruising or bleeding gums    MEDICATIONS  (STANDING):  Home Meds:   Gabapentin  Oxycodone  Keppra 1000mg BID  Enoxaparin 40mg qd  Amlodipine 5mg qd  Labetalol 100 BID  Mirtazapine 15mg qhs  Ramelton 8mg qd  Tizanidine 2mg  MEDICATIONS  (PRN):    Allergies    crawfish (Anaphylaxis)  Keflex (Anaphylaxis)  Kiwi (Anaphylaxis)  penicillin (Anaphylaxis)  tetracycline (Anaphylaxis)    Intolerances      Vital Signs Last 24 Hrs  T(C): 36.8 (24 Jul 2021 17:42), Max: 36.8 (24 Jul 2021 17:42)  T(F): 98.3 (24 Jul 2021 17:42), Max: 98.3 (24 Jul 2021 17:42)  HR: 105 (24 Jul 2021 17:42) (65 - 105)  BP: 140/72 (24 Jul 2021 17:42) (121/90 - 140/72)  BP(mean): --  RR: 20 (24 Jul 2021 17:42) (18 - 20)  SpO2: 97% (24 Jul 2021 17:42) (95% - 97%)    Physical exam:  Constitutional: Mild-Mod distress, conversant  Eyes: Anicteric sclerae, moist conjunctivae, see below for CNs  Neck: trachea midline, FROM, supple, no thyromegaly or lymphadenopathy  Cardiovascular: Regular rate and rhythm, no murmurs, rubs, or gallops. No carotid bruits.   Pulmonary: Anterior breath sounds clear bilaterally, no crackles or wheezing. No use of accessory muscles  GI: Abdomen soft, non-distended, non-tender  Extremities: Radial and DP pulses +2, B/L LE 2+ edema    Neurologic:  -Mental status: Awake, alert, oriented to personOnly. Speech is not fluent with repetition, and comprehension, no dysarthria. Recent and remote memory intact. Follows commands. Attention/concentration intact. Fund of knowledge appropriate.  -Cranial nerves:   II: Visual fields are full to confrontation.  III, IV, VI: Extraocular movements are impaired. Without nystagmus. Pupils equally round and reactive to light.  V:  Facial sensation V1-V3 equal and intact on the Left side, Diminished on the R side.    VII: Face is symmetric with normal eye closure and smile.  VIII: Hearing is bilaterally intact to finger rub  IX, X: Uvula is midline and soft palate rises symmetrically  XI: Head turning toward left. Not able to do shoulder shrug.   XII: Tongue protrudes midline  Motor: bulk and tone decreased. Unable to perform pronator drift. Strength L upper extremity 2/5, RUE 0/5. bilateral lower extremities 0/5.  Was not able to assess Rapid alternating movements.  Sensation:deminished light touch R face. bilaterally. R side neglect or extinction on double simultaneous testing positive.   Coordination: Unable to assess for dysmetria on finger-to-nose and heel-to-shin.  Reflexes: Diminished babinski   Gait: Unable to walk or move carmelina.     NIHSS:23 ASPECT Score: 9.   Fingerstick Blood Glucose: CAPILLARY BLOOD GLUCOSE      POCT Blood Glucose.: 95 mg/dL (24 Jul 2021 16:29)    LABS:                        11.3   11.44 )-----------( 182      ( 24 Jul 2021 17:03 )             36.1     07-24    139  |  109<H>  |  16  ----------------------------<  125<H>  4.2   |  18<L>  |  1.08    Ca    10.0      24 Jul 2021 17:03    TPro  6.0  /  Alb  2.9<L>  /  TBili  0.4  /  DBili  x   /  AST  16  /  ALT  7<L>  /  AlkPhos  61  07-24    PT/INR - ( 24 Jul 2021 17:03 )   PT: 12.9 sec;   INR: 1.08          PTT - ( 24 Jul 2021 17:03 )  PTT:26.9 sec  CARDIAC MARKERS ( 24 Jul 2021 17:03 )  x     / 0.03 ng/mL / x     / x     / x              RADIOLOGY & ADDITIONAL STUDIES:      -----------------------------------------------------------------------------------------------------------------  IV-tPA (Y/N):    ***                              Bolus time:    Alteplase Dose Verification w/ RN:  Reason IV-tPA not given: ***    **STROKE CODE CONSULT NOTE**    Last known well time/Time of onset of symptoms: 8PM July 23rd.     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   Patient last known well time is 8pm July 23rd which her daughter visited her at nursing home. Today morning her daughter noticed the patient declined from her baseline and experiencing slurred speech with left facial droop and left sided weakness.   On arrival patient with R hemineglect with head turned toward left, NIHSS 23, she had altered level of consciousness facial palsy, left arm and B/L LE weakness, mild-mod loss of sensation and R hemineglect.   Pt is accompanied by her daughter, she stated her mother has L sided weakness at her baseline.   In ED patient complained of atypical chest pain, B/L LE pain, R hip pain.   History is limited, pending patient's daughter to bring documents from [facility].     T(C): 36.8 (07-24-21 @ 17:42), Max: 36.8 (07-24-21 @ 17:42)  HR: 105 (07-24-21 @ 17:42) (65 - 105)  BP: 140/72 (07-24-21 @ 17:42) (121/90 - 140/72)  RR: 20 (07-24-21 @ 17:42) (18 - 20)  SpO2: 97% (07-24-21 @ 17:42) (95% - 97%)    PAST MEDICAL & SURGICAL HISTORY:  Melanoma with mets.   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     ROS: ***  Constitutional: Generalized pain mostly over LE B/L. No fever, weight loss.   Eyes: vision impairment.   ENMT:  No difficulty hearing, tinnitus; No sinus or throat pain  Neck: No pain or stiffness  Respiratory: No cough, wheezing, chills or hemoptysis  Cardiovascular: Mild chest pain, B/L leg swelling R>L, no palpitations, shortness of breath.   Gastrointestinal: No abdominal pain. No nausea, vomiting or hematemesis; No diarrhea or constipation. Nohematochezia.  Genitourinary: No dysuria, frequency, hematuria or incontinence  Neurological: As per HPI  Skin: No itching, burning, rashes or lesions   Endocrine: No heat or cold intolerance; No hair loss  Musculoskeletal: multifocal muscle, back or extremity pain  Heme/Lymph: No easy bruising or bleeding gums    MEDICATIONS  (STANDING):  Home Meds:   Gabapentin  Oxycodone  Keppra 1000mg BID  Enoxaparin 40mg qd  Amlodipine 5mg qd  Labetalol 100 BID  Mirtazapine 15mg qhs  Ramelton 8mg qd  Tizanidine 2mg  MEDICATIONS  (PRN):    Allergies    crawfish (Anaphylaxis)  Keflex (Anaphylaxis)  Kiwi (Anaphylaxis)  penicillin (Anaphylaxis)  tetracycline (Anaphylaxis)    Intolerances      Vital Signs Last 24 Hrs  T(C): 36.8 (24 Jul 2021 17:42), Max: 36.8 (24 Jul 2021 17:42)  T(F): 98.3 (24 Jul 2021 17:42), Max: 98.3 (24 Jul 2021 17:42)  HR: 105 (24 Jul 2021 17:42) (65 - 105)  BP: 140/72 (24 Jul 2021 17:42) (121/90 - 140/72)  BP(mean): --  RR: 20 (24 Jul 2021 17:42) (18 - 20)  SpO2: 97% (24 Jul 2021 17:42) (95% - 97%)    Physical exam:  Constitutional: Mild-Mod distress, conversant  Eyes: Anicteric sclerae, moist conjunctivae, see below for CNs  Neck: trachea midline, FROM, supple, no thyromegaly or lymphadenopathy  Cardiovascular: Regular rate and rhythm, no murmurs, rubs, or gallops. No carotid bruits.   Pulmonary: Anterior breath sounds clear bilaterally, no crackles or wheezing. No use of accessory muscles  GI: Abdomen soft, non-distended, non-tender  Extremities: Radial and DP pulses +2, B/L LE 2+ edema    Neurologic:  -Mental status: Awake, alert, oriented to personOnly. Speech is not fluent with repetition, and comprehension, no dysarthria. Recent and remote memory intact. Follows commands. Attention/concentration intact. Fund of knowledge appropriate.  -Cranial nerves:   II: Visual fields are full to confrontation.  III, IV, VI: Extraocular movements are impaired. Without nystagmus. Pupils equally round and reactive to light.  V:  Facial sensation V1-V3 equal and intact on the Left side, Diminished on the R side.    VII: Face is symmetric with normal eye closure and smile.  VIII: Hearing is bilaterally intact to finger rub  IX, X: Uvula is midline and soft palate rises symmetrically  XI: Head turning toward left. Not able to do shoulder shrug.   XII: Tongue protrudes midline  Motor: bulk and tone decreased. Unable to perform pronator drift. Strength L upper extremity 2/5, RUE 0/5. bilateral lower extremities 0/5.  Was not able to assess Rapid alternating movements.  Sensation:deminished light touch R face. bilaterally. R side neglect or extinction on double simultaneous testing positive.   Coordination: Unable to assess for dysmetria on finger-to-nose and heel-to-shin.  Reflexes: Diminished babinski   Gait: Unable to walk or move carmelina.     NIHSS:23 ASPECT Score: 9.   Fingerstick Blood Glucose: CAPILLARY BLOOD GLUCOSE      POCT Blood Glucose.: 95 mg/dL (24 Jul 2021 16:29)    LABS:                        11.3   11.44 )-----------( 182      ( 24 Jul 2021 17:03 )             36.1     07-24    139  |  109<H>  |  16  ----------------------------<  125<H>  4.2   |  18<L>  |  1.08    Ca    10.0      24 Jul 2021 17:03    TPro  6.0  /  Alb  2.9<L>  /  TBili  0.4  /  DBili  x   /  AST  16  /  ALT  7<L>  /  AlkPhos  61  07-24    PT/INR - ( 24 Jul 2021 17:03 )   PT: 12.9 sec;   INR: 1.08          PTT - ( 24 Jul 2021 17:03 )  PTT:26.9 sec  CARDIAC MARKERS ( 24 Jul 2021 17:03 )  x     / 0.03 ng/mL / x     / x     / x              RADIOLOGY & ADDITIONAL STUDIES:      -----------------------------------------------------------------------------------------------------------------  IV-tPA (Y/N):    ***                              Bolus time:    Alteplase Dose Verification w/ RN:  Reason IV-tPA not given: ***    **STROKE CODE CONSULT NOTE**    Last known well time/Time of onset of symptoms: 8PM July 23rd.     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   Patient last known well time is 8pm July 23rd which her daughter visited her at nursing home. Today morning her daughter noticed the patient declined from her baseline and experiencing slurred speech with left facial droop and left sided weakness.   On arrival patient with R hemineglect with head turned toward left, NIHSS 23, she had altered level of consciousness facial palsy, left arm and B/L LE weakness, mild-mod loss of sensation and R hemineglect.   Pt is accompanied by her daughter, she stated her mother has L sided weakness at her baseline.   In ED patient complained of atypical chest pain, B/L LE pain, R hip pain.   History is limited, pending patient's daughter to bring documents from [facility].     T(C): 36.8 (07-24-21 @ 17:42), Max: 36.8 (07-24-21 @ 17:42)  HR: 105 (07-24-21 @ 17:42) (65 - 105)  BP: 140/72 (07-24-21 @ 17:42) (121/90 - 140/72)  RR: 20 (07-24-21 @ 17:42) (18 - 20)  SpO2: 97% (07-24-21 @ 17:42) (95% - 97%)    PAST MEDICAL & SURGICAL HISTORY:  Melanoma with mets.   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     ROS: ***  Constitutional: Generalized pain mostly over LE B/L. No fever, weight loss.   Eyes: vision impairment.   ENMT:  No difficulty hearing, tinnitus; No sinus or throat pain  Neck: No pain or stiffness  Respiratory: No cough, wheezing, chills or hemoptysis  Cardiovascular: Mild chest pain, B/L leg swelling R>L, no palpitations, shortness of breath.   Gastrointestinal: No abdominal pain. No nausea, vomiting or hematemesis; No diarrhea or constipation. Nohematochezia.  Genitourinary: No dysuria, frequency, hematuria or incontinence  Neurological: As per HPI  Skin: No itching, burning, rashes or lesions   Endocrine: No heat or cold intolerance; No hair loss  Musculoskeletal: multifocal muscle, back or extremity pain  Heme/Lymph: No easy bruising or bleeding gums    MEDICATIONS  (STANDING):  Home Meds:   Gabapentin  Oxycodone  Keppra 1000mg BID  Enoxaparin 40mg qd  Amlodipine 5mg qd  Labetalol 100 BID  Mirtazapine 15mg qhs  Ramelton 8mg qd  Tizanidine 2mg  MEDICATIONS  (PRN):    Allergies    crawfish (Anaphylaxis)  Keflex (Anaphylaxis)  Kiwi (Anaphylaxis)  penicillin (Anaphylaxis)  tetracycline (Anaphylaxis)    Intolerances      Vital Signs Last 24 Hrs  T(C): 36.8 (24 Jul 2021 17:42), Max: 36.8 (24 Jul 2021 17:42)  T(F): 98.3 (24 Jul 2021 17:42), Max: 98.3 (24 Jul 2021 17:42)  HR: 105 (24 Jul 2021 17:42) (65 - 105)  BP: 140/72 (24 Jul 2021 17:42) (121/90 - 140/72)  BP(mean): --  RR: 20 (24 Jul 2021 17:42) (18 - 20)  SpO2: 97% (24 Jul 2021 17:42) (95% - 97%)    Physical exam:  Constitutional: Mild-Mod distress, conversant  Eyes: Anicteric sclerae, moist conjunctivae, see below for CNs  Neck: trachea midline, FROM, supple, no thyromegaly or lymphadenopathy  Cardiovascular: Regular rate and rhythm, no murmurs, rubs, or gallops. No carotid bruits.   Pulmonary: Anterior breath sounds clear bilaterally, no crackles or wheezing. No use of accessory muscles  GI: Abdomen soft, non-distended, non-tender  Extremities: Radial and DP pulses +2, B/L LE 2+ edema    Neurologic:  -Mental status: Awake, alert, oriented to personOnly. Speech is not fluent with repetition, and comprehension, no dysarthria. Recent and remote memory intact. Follows commands. Attention/concentration intact. Fund of knowledge appropriate.  -Cranial nerves:   II: Visual fields are full to confrontation.  III, IV, VI: Extraocular movements are impaired. Without nystagmus. Pupils equally round and reactive to light.  V:  Facial sensation V1-V3 equal and intact on the Left side, Diminished on the R side.    VII: Face is symmetric with normal eye closure and smile.  VIII: Hearing is bilaterally intact to finger rub  IX, X: Uvula is midline and soft palate rises symmetrically  XI: Head turning toward left. Not able to do shoulder shrug.   XII: Tongue protrudes midline  Motor: bulk and tone decreased. Unable to perform pronator drift. Strength L upper extremity 2/5, RUE 0/5. bilateral lower extremities 0/5.  Was not able to assess Rapid alternating movements.  Sensation:deminished light touch R face. bilaterally. R side neglect or extinction on double simultaneous testing positive.   Coordination: Unable to assess for dysmetria on finger-to-nose and heel-to-shin.  Reflexes: Diminished babinski   Gait: Unable to walk or move carmelina.     NIHSS:23   ASPECT Score: 9.   Fingerstick Blood Glucose: CAPILLARY BLOOD GLUCOSE      POCT Blood Glucose.: 95 mg/dL (24 Jul 2021 16:29)    LABS:                        11.3   11.44 )-----------( 182      ( 24 Jul 2021 17:03 )             36.1     07-24    139  |  109<H>  |  16  ----------------------------<  125<H>  4.2   |  18<L>  |  1.08    Ca    10.0      24 Jul 2021 17:03    TPro  6.0  /  Alb  2.9<L>  /  TBili  0.4  /  DBili  x   /  AST  16  /  ALT  7<L>  /  AlkPhos  61  07-24    PT/INR - ( 24 Jul 2021 17:03 )   PT: 12.9 sec;   INR: 1.08          PTT - ( 24 Jul 2021 17:03 )  PTT:26.9 sec  CARDIAC MARKERS ( 24 Jul 2021 17:03 )  x     / 0.03 ng/mL / x     / x     / x              RADIOLOGY & ADDITIONAL STUDIES:  CT Head:   No acute intracranial hemorrhage or transcortical infarction.    Status post right frontal craniotomy with subjacent right frontal lobe encephalomalacic changes.    -----------------------------------------------------------------------------------------------------------------  IV-tPA (Y/N):   N                              Bolus time:    Alteplase Dose Verification w/ RN: N/A  Reason IV-tPA not given: Not given    **STROKE CODE CONSULT NOTE**    Last known well time/Time of onset of symptoms: 8PM July 23rd.     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   Patient last known well time is 8pm July 23rd which her daughter visited her at nursing home. Today afternoon at 14:30 her daughter noticed the patient declined from her baseline and experiencing slurred speech with left facial droop and left sided weakness.   On arrival patient with R hemineglect with head turned toward left, NIHSS 23, she had altered level of consciousness facial palsy, left arm and B/L LE weakness, mild-mod loss of sensation and R hemineglect.   Pt is accompanied by her daughter, she stated her mother has L sided weakness at her baseline.   In ED patient complained of atypical chest pain, B/L LE pain, R hip pain.   History is limited, pending patient's daughter to bring documents from [facility].     T(C): 36.8 (07-24-21 @ 17:42), Max: 36.8 (07-24-21 @ 17:42)  HR: 105 (07-24-21 @ 17:42) (65 - 105)  BP: 140/72 (07-24-21 @ 17:42) (121/90 - 140/72)  RR: 20 (07-24-21 @ 17:42) (18 - 20)  SpO2: 97% (07-24-21 @ 17:42) (95% - 97%)    PAST MEDICAL & SURGICAL HISTORY:  Melanoma with mets.   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     ROS: ***  Constitutional: Generalized pain mostly over LE B/L. No fever, weight loss.   Eyes: vision impairment.   ENMT:  No difficulty hearing, tinnitus; No sinus or throat pain  Neck: No pain or stiffness  Respiratory: No cough, wheezing, chills or hemoptysis  Cardiovascular: Mild chest pain, B/L leg swelling R>L, no palpitations, shortness of breath.   Gastrointestinal: No abdominal pain. No nausea, vomiting or hematemesis; No diarrhea or constipation. Nohematochezia.  Genitourinary: No dysuria, frequency, hematuria or incontinence  Neurological: As per HPI  Skin: No itching, burning, rashes or lesions   Endocrine: No heat or cold intolerance; No hair loss  Musculoskeletal: multifocal muscle, back or extremity pain  Heme/Lymph: No easy bruising or bleeding gums    MEDICATIONS  (STANDING):  Home Meds:   Gabapentin  Oxycodone  Keppra 1000mg BID  Enoxaparin 40mg qd  Amlodipine 5mg qd  Labetalol 100 BID  Mirtazapine 15mg qhs  Ramelton 8mg qd  Tizanidine 2mg  MEDICATIONS  (PRN):    Allergies    crawfish (Anaphylaxis)  Keflex (Anaphylaxis)  Kiwi (Anaphylaxis)  penicillin (Anaphylaxis)  tetracycline (Anaphylaxis)    Intolerances      Vital Signs Last 24 Hrs  T(C): 36.8 (24 Jul 2021 17:42), Max: 36.8 (24 Jul 2021 17:42)  T(F): 98.3 (24 Jul 2021 17:42), Max: 98.3 (24 Jul 2021 17:42)  HR: 105 (24 Jul 2021 17:42) (65 - 105)  BP: 140/72 (24 Jul 2021 17:42) (121/90 - 140/72)  BP(mean): --  RR: 20 (24 Jul 2021 17:42) (18 - 20)  SpO2: 97% (24 Jul 2021 17:42) (95% - 97%)    Physical exam:  Constitutional: Mild-Mod distress, conversant  Eyes: Anicteric sclerae, moist conjunctivae, see below for CNs  Neck: trachea midline, FROM, supple, no thyromegaly or lymphadenopathy  Cardiovascular: Regular rate and rhythm, no murmurs, rubs, or gallops. No carotid bruits.   Pulmonary: Anterior breath sounds clear bilaterally, no crackles or wheezing. No use of accessory muscles  GI: Abdomen soft, non-distended, non-tender  Extremities: Radial and DP pulses +2, B/L LE 2+ edema    Neurologic:  -Mental status: Awake, alert, oriented to personOnly. Speech is not fluent with repetition, and comprehension, no dysarthria. Recent and remote memory intact. Follows commands. Attention/concentration intact. Fund of knowledge appropriate.  -Cranial nerves:   II: Visual fields are full to confrontation.  III, IV, VI: Extraocular movements are impaired. Without nystagmus. Pupils equally round and reactive to light.  V:  Facial sensation V1-V3 equal and intact on the Left side, Diminished on the R side.    VII: Face is symmetric with normal eye closure and smile.  VIII: Hearing is bilaterally intact to finger rub  IX, X: Uvula is midline and soft palate rises symmetrically  XI: Head turning toward left. Not able to do shoulder shrug.   XII: Tongue protrudes midline  Motor: bulk and tone decreased. Unable to perform pronator drift. Strength L upper extremity 2/5, RUE 0/5. bilateral lower extremities 0/5.  Was not able to assess Rapid alternating movements.  Sensation:deminished light touch R face. bilaterally. R side neglect or extinction on double simultaneous testing positive.   Coordination: Unable to assess for dysmetria on finger-to-nose and heel-to-shin.  Reflexes: Diminished babinski   Gait: Unable to walk or move carmelina.     NIHSS:23   ASPECT Score: 9.   Fingerstick Blood Glucose: CAPILLARY BLOOD GLUCOSE      POCT Blood Glucose.: 95 mg/dL (24 Jul 2021 16:29)    LABS:                        11.3   11.44 )-----------( 182      ( 24 Jul 2021 17:03 )             36.1     07-24    139  |  109<H>  |  16  ----------------------------<  125<H>  4.2   |  18<L>  |  1.08    Ca    10.0      24 Jul 2021 17:03    TPro  6.0  /  Alb  2.9<L>  /  TBili  0.4  /  DBili  x   /  AST  16  /  ALT  7<L>  /  AlkPhos  61  07-24    PT/INR - ( 24 Jul 2021 17:03 )   PT: 12.9 sec;   INR: 1.08          PTT - ( 24 Jul 2021 17:03 )  PTT:26.9 sec  CARDIAC MARKERS ( 24 Jul 2021 17:03 )  x     / 0.03 ng/mL / x     / x     / x              RADIOLOGY & ADDITIONAL STUDIES:  CT Head:   No acute intracranial hemorrhage or transcortical infarction.    Status post right frontal craniotomy with subjacent right frontal lobe encephalomalacic changes.    -----------------------------------------------------------------------------------------------------------------  IV-tPA (Y/N):   N                              Bolus time:    Alteplase Dose Verification w/ RN: N/A  Reason IV-tPA not given: Not given    **STROKE CODE CONSULT NOTE**    Last known well time/Time of onset of symptoms: 8PM July 23rd.     HPI: 75y Female with PMHx of Melanoma with mets to brain, and multiple organs, HTN, PE on Enoxaparin and seizure on Keppra secondary to brain mets, S/P craniotomy x2(June 2020, July 2021)who presented to the ED with CC of left side weakness, slurred speech and left side facial droop.   Patient last known well time is 8pm July 23rd which her daughter visited her at nursing home. Today afternoon at 14:30 her daughter noticed the patient declined from her baseline and experiencing slurred speech with left facial droop and left sided weakness.   On arrival patient with R hemineglect with head turned toward left, NIHSS 23, she had altered level of consciousness facial palsy, left arm and B/L LE weakness, mild-mod loss of sensation and R hemineglect.   Pt is accompanied by her daughter, she stated her mother has L sided weakness at her baseline.   In ED patient complained of atypical chest pain, B/L LE pain, R hip pain.   History is limited, pending patient's daughter to bring documents from [facility].     T(C): 36.8 (07-24-21 @ 17:42), Max: 36.8 (07-24-21 @ 17:42)  HR: 105 (07-24-21 @ 17:42) (65 - 105)  BP: 140/72 (07-24-21 @ 17:42) (121/90 - 140/72)  RR: 20 (07-24-21 @ 17:42) (18 - 20)  SpO2: 97% (07-24-21 @ 17:42) (95% - 97%)    PAST MEDICAL & SURGICAL HISTORY:  Melanoma with mets.   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     ROS: ***  Constitutional: Generalized pain mostly over LE B/L. No fever, weight loss.   Eyes: vision impairment.   ENMT:  No difficulty hearing, tinnitus; No sinus or throat pain  Neck: No pain or stiffness  Respiratory: No cough, wheezing, chills or hemoptysis  Cardiovascular: Mild chest pain, B/L leg swelling R>L, no palpitations, shortness of breath.   Gastrointestinal: No abdominal pain. No nausea, vomiting or hematemesis; No diarrhea or constipation. Nohematochezia.  Genitourinary: No dysuria, frequency, hematuria or incontinence  Neurological: As per HPI  Skin: No itching, burning, rashes or lesions   Endocrine: No heat or cold intolerance; No hair loss  Musculoskeletal: multifocal muscle, back or extremity pain, LE numbness.   Heme/Lymph: No easy bruising or bleeding gums    MEDICATIONS  (STANDING):  Home Meds:   Gabapentin  Oxycodone  Keppra 1000mg BID  Enoxaparin 40mg qd  Amlodipine 5mg qd  Labetalol 100 BID  Mirtazapine 15mg qhs  Ramelton 8mg qd  Tizanidine 2mg    Allergies  crawfish (Anaphylaxis)  Keflex (Anaphylaxis)  Kiwi (Anaphylaxis)  penicillin (Anaphylaxis)  tetracycline (Anaphylaxis)    Vital Signs Last 24 Hrs  T(C): 36.8 (24 Jul 2021 17:42), Max: 36.8 (24 Jul 2021 17:42)  T(F): 98.3 (24 Jul 2021 17:42), Max: 98.3 (24 Jul 2021 17:42)  HR: 105 (24 Jul 2021 17:42) (65 - 105)  BP: 140/72 (24 Jul 2021 17:42) (121/90 - 140/72)  BP(mean): --  RR: 20 (24 Jul 2021 17:42) (18 - 20)  SpO2: 97% (24 Jul 2021 17:42) (95% - 97%)    Physical exam:  Constitutional: Mild-Mod distress, conversant  Eyes: Anicteric sclerae, moist conjunctivae, see below for CNs  Neck: trachea midline, FROM, supple, no thyromegaly or lymphadenopathy  Cardiovascular: Regular rate and rhythm, no murmurs, rubs, or gallops. No carotid bruits.   Pulmonary: Anterior breath sounds clear bilaterally, no crackles or wheezing. No use of accessory muscles  GI: Abdomen soft, non-distended, non-tender  Extremities: Radial and DP pulses +2, B/L LE 2+ edema    Neurologic:  -Mental status: Awake, alert, oriented to person only. Speech is not fluent with repetition, and comprehension, no dysarthria. Recent and remote memory intact. Follows commands. Attention/concentration intact. Fund of knowledge appropriate.  -Cranial nerves:   II: Visual fields are full to confrontation.  III, IV, VI: Extraocular movements are impaired. Without nystagmus. Pupils equally round and reactive to light.  V: Facial sensation V1-V3 equal and intact on the Left side, Diminished on V1-V3 on the R side.    VII: Face is symmetric with normal eye closure and smile.  VIII: Hearing is bilaterally intact to finger rub  IX, X: Uvula is midline and soft palate rises symmetrically  XI: Head turning toward left. Not able to do shoulder shrug.   XII: Tongue protrudes midline  Motor: bulk and tone decreased. Strength L upper extremity 2/5, RUE 0/5, withdraws to noxious stimuli. Bilateral lower extremities 0/5 withdraws to noxious stimuli.  Sensation: diminished light touch R face. R side neglect/extinction on double simultaneous.  Coordination: Unable to assess for dysmetria on finger-to-nose and heel-to-shin.  Reflexes: mute babinski B/l.   Gait: Unable to assess.     NIHSS:23   ASPECT Score: 9.   Fingerstick Blood Glucose: CAPILLARY BLOOD GLUCOSE      POCT Blood Glucose.: 95 mg/dL (24 Jul 2021 16:29)    LABS:                        11.3   11.44 )-----------( 182      ( 24 Jul 2021 17:03 )             36.1     07-24    139  |  109<H>  |  16  ----------------------------<  125<H>  4.2   |  18<L>  |  1.08    Ca    10.0      24 Jul 2021 17:03    TPro  6.0  /  Alb  2.9<L>  /  TBili  0.4  /  DBili  x   /  AST  16  /  ALT  7<L>  /  AlkPhos  61  07-24    PT/INR - ( 24 Jul 2021 17:03 )   PT: 12.9 sec;   INR: 1.08          PTT - ( 24 Jul 2021 17:03 )  PTT:26.9 sec  CARDIAC MARKERS ( 24 Jul 2021 17:03 )  x     / 0.03 ng/mL / x     / x     / x              RADIOLOGY & ADDITIONAL STUDIES:  CT Head:   No acute intracranial hemorrhage or transcortical infarction.    Status post right frontal craniotomy with subjacent right frontal lobe encephalomalacic changes.  CTA Head:   1. Postoperative changes in the right frontal lobe. Mild leptomeningeal enhancement may be present along the right frontal convexity. An MRI with contrast is recommended to exclude any underlying residual/recurrent malignancy.  2. No large vessel occlusion   CT Neck: < from: CT Angio Neck w/ IV Cont (07.24.21 @ 17:16) >  1. Multiple pulmonary metastases  2. Multiple soft tissue metastases  3. No acute vascular abnormality    < end of copied text >    < from: CT Perfusion w/ Maps w/ IV Cont (07.24.21 @ 17:15) >  IMPRESSION:    1. No acute abnormality.  2. Chronic findings as discussed above.    < end of copied text >          -----------------------------------------------------------------------------------------------------------------  IV-tPA (Y/N):   N                              Bolus time:    Alteplase Dose Verification w/ RN: N/A  Reason IV-tPA not given: Not given    **STROKE CODE CONSULT NOTE**    Last known well time/Time of onset of symptoms: 8PM July 23rd.     HPI: 75y Female with PMHx of Melanoma with mets to brain, and multiple organs, HTN, PE on Enoxaparin and seizure on Keppra secondary to brain mets, S/P craniotomy x2(June 2020, July 2021)who presented to the ED with CC of left side weakness, slurred speech and left side facial droop. Patient last known well time is 8pm July 23rd which her daughter visited her at nursing home. Today afternoon at 14:30 her daughter noticed the patient declined from her baseline and experiencing slurred speech with left facial droop and left sided weakness.   On arrival patient with R hemineglect with head turned toward left, NIHSS 23, she had altered level of consciousness facial palsy, left arm and B/L LE weakness, mild-mod loss of sensation and R hemineglect.   Pt is accompanied by her daughter, she stated her mother has L sided weakness at her baseline.   In ED patient complained of atypical chest pain, B/L LE pain, R hip pain.   History is limited, pending patient's daughter to bring documents from [facility].     T(C): 36.8 (07-24-21 @ 17:42), Max: 36.8 (07-24-21 @ 17:42)  HR: 105 (07-24-21 @ 17:42) (65 - 105)  BP: 140/72 (07-24-21 @ 17:42) (121/90 - 140/72)  RR: 20 (07-24-21 @ 17:42) (18 - 20)  SpO2: 97% (07-24-21 @ 17:42) (95% - 97%)    PAST MEDICAL & SURGICAL HISTORY:  Melanoma with mets.   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     ROS:  Constitutional: Generalized pain mostly over LE B/L. No fever, weight loss.   Eyes: vision impairment.   ENMT:  No difficulty hearing, tinnitus; No sinus or throat pain  Neck: No pain or stiffness  Respiratory: No cough, wheezing, chills or hemoptysis  Cardiovascular: Mild chest pain, B/L leg swelling R>L, no palpitations, shortness of breath.   Gastrointestinal: No abdominal pain. No nausea, vomiting or hematemesis; No diarrhea or constipation. Nohematochezia.  Genitourinary: No dysuria, frequency, hematuria or incontinence  Neurological: As per HPI  Skin: No itching, burning, rashes or lesions   Endocrine: No heat or cold intolerance; No hair loss  Musculoskeletal: multifocal muscle, back or extremity pain, LE numbness.   Heme/Lymph: No easy bruising or bleeding gums    MEDICATIONS  (STANDING):  Home Meds:   Gabapentin  Oxycodone  Keppra 1000mg BID  Enoxaparin 40mg qd  Amlodipine 5mg qd  Labetalol 100 BID  Mirtazapine 15mg qhs  Ramelton 8mg qd  Tizanidine 2mg    Allergies  crawfish (Anaphylaxis)  Keflex (Anaphylaxis)  Kiwi (Anaphylaxis)  penicillin (Anaphylaxis)  tetracycline (Anaphylaxis)    Vital Signs Last 24 Hrs  T(C): 36.8 (24 Jul 2021 17:42), Max: 36.8 (24 Jul 2021 17:42)  T(F): 98.3 (24 Jul 2021 17:42), Max: 98.3 (24 Jul 2021 17:42)  HR: 105 (24 Jul 2021 17:42) (65 - 105)  BP: 140/72 (24 Jul 2021 17:42) (121/90 - 140/72)  RR: 20 (24 Jul 2021 17:42) (18 - 20)  SpO2: 97% (24 Jul 2021 17:42) (95% - 97%)    Physical exam:  Constitutional: Mild-Mod distress, conversant  Eyes: Anicteric sclerae, moist conjunctivae, see below for CNs  Neck: trachea midline, FROM, supple, no thyromegaly or lymphadenopathy  Cardiovascular: Regular rate and rhythm, no murmurs, rubs, or gallops. No carotid bruits.   Pulmonary: Anterior breath sounds clear bilaterally, no crackles or wheezing. No use of accessory muscles  GI: Abdomen soft, non-distended, non-tender  Extremities: Radial and DP pulses +2, B/L LE 2+ edema    Neurologic:  -Mental status: Awake, alert, oriented to person only. Speech is mildly dysarthric with repetition, and comprehension. Recent and remote memory intact. Follows commands. Attention/concentration intact. Fund of knowledge appropriate.  -Cranial nerves:   II: Visual fields are full to confrontation.  III, IV, VI: Extraocular movements are impaired. Without nystagmus. Pupils equally round and reactive to light.  V: Facial sensation V1-V3 equal and intact on the Left side, Diminished on V1-V3 on the R side.    VII: Face is symmetric with normal eye closure and smile.  VIII: Hearing is bilaterally intact to finger rub  IX, X: Uvula is midline and soft palate rises symmetrically  XI: Head turning toward left. Not able to do shoulder shrug.   XII: Tongue protrudes midline  Motor: bulk and tone decreased. Strength L upper extremity 2/5, RUE 0/5, withdraws to noxious stimuli. Bilateral lower extremities 0/5 withdraws to noxious stimuli.  Sensation: diminished light touch R face. R side neglect/extinction on double simultaneous.  Coordination: Unable to assess for dysmetria on finger-to-nose and heel-to-shin.  Reflexes: mute babinski B/l.   Gait: Unable to assess.     NIHSS: 16  ASPECT Score: 9.   POCT Blood Glucose.: 95 mg/dL (24 Jul 2021 16:29)    LABS:                        11.3   11.44 )-----------( 182      ( 24 Jul 2021 17:03 )             36.1     07-24    139  |  109<H>  |  16  ----------------------------<  125<H>  4.2   |  18<L>  |  1.08    Ca    10.0      24 Jul 2021 17:03    TPro  6.0  /  Alb  2.9<L>  /  TBili  0.4  /  DBili  x   /  AST  16  /  ALT  7<L>  /  AlkPhos  61  07-24    PT/INR - ( 24 Jul 2021 17:03 )   PT: 12.9 sec;   INR: 1.08          PTT - ( 24 Jul 2021 17:03 )  PTT:26.9 sec  CARDIAC MARKERS ( 24 Jul 2021 17:03 )  x     / 0.03 ng/mL / x     / x     / x          RADIOLOGY & ADDITIONAL STUDIES:  CT Head:   No acute intracranial hemorrhage or transcortical infarction.  Status post right frontal craniotomy with subjacent right frontal lobe encephalomalacic changes.    CTA Head:   1. Postoperative changes in the right frontal lobe. Mild leptomeningeal enhancement may be present along the right frontal convexity. An MRI with contrast is recommended to exclude any underlying residual/recurrent malignancy.  2. No large vessel occlusion    CT Angio Neck w/ IV Cont (07.24.21 @ 17:16)  1. Multiple pulmonary metastases  2. Multiple soft tissue metastases  3. No acute vascular abnormality    < from: CT Perfusion w/ Maps w/ IV Cont (07.24.21 @ 17:15) >  IMPRESSION:    1. No acute abnormality.  2. Chronic findings as discussed above.  -----------------------------------------------------------------------------------------------------------------  IV-tPA (Y/N):   N                              Bolus time:    Alteplase Dose Verification w/ RN: N/A  Reason IV-tPA not given: Not given    **STROKE CODE CONSULT NOTE**    Last known well time/Time of onset of symptoms: 8PM July 23rd.     HPI: 75y Female with PMHx of Melanoma with mets to brain, and multiple organs, HTN, PE on Enoxaparin and seizure on Keppra secondary to brain mets, S/P craniotomy x2(June 2020, July 2021)who presented to the ED with CC of left side weakness, slurred speech and left side facial droop. Patient last known well time is 8pm July 23rd which her daughter visited her at nursing home. Today afternoon at 14:30 her daughter noticed the patient declined from her baseline and experiencing slurred speech with left facial droop and left sided weakness.   On arrival patient with R hemineglect with head turned toward left, NIHSS 16, she had altered level of consciousness facial palsy, left arm and B/L LE weakness, mild-mod loss of sensation and R hemineglect.   Pt is accompanied by her daughter, she stated her mother has L sided weakness at her baseline.   In ED patient complained of atypical chest pain, B/L LE pain, R hip pain.   History is limited, pending patient's daughter to bring documents from [facility].     T(C): 36.8 (07-24-21 @ 17:42), Max: 36.8 (07-24-21 @ 17:42)  HR: 105 (07-24-21 @ 17:42) (65 - 105)  BP: 140/72 (07-24-21 @ 17:42) (121/90 - 140/72)  RR: 20 (07-24-21 @ 17:42) (18 - 20)  SpO2: 97% (07-24-21 @ 17:42) (95% - 97%)    PAST MEDICAL & SURGICAL HISTORY:  Melanoma with mets.   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     ROS:  Constitutional: Generalized pain mostly over LE B/L. No fever, weight loss.   Eyes: vision impairment.   ENMT:  No difficulty hearing, tinnitus; No sinus or throat pain  Neck: No pain or stiffness  Respiratory: No cough, wheezing, chills or hemoptysis  Cardiovascular: Mild chest pain, B/L leg swelling R>L, no palpitations, shortness of breath.   Gastrointestinal: No abdominal pain. No nausea, vomiting or hematemesis; No diarrhea or constipation. Nohematochezia.  Genitourinary: No dysuria, frequency, hematuria or incontinence  Neurological: As per HPI  Skin: No itching, burning, rashes or lesions   Endocrine: No heat or cold intolerance; No hair loss  Musculoskeletal: multifocal muscle, back or extremity pain, LE numbness.   Heme/Lymph: No easy bruising or bleeding gums    MEDICATIONS  (STANDING):  Home Meds:   Gabapentin  Oxycodone  Keppra 1000mg BID  Enoxaparin 40mg qd  Amlodipine 5mg qd  Labetalol 100 BID  Mirtazapine 15mg qhs  Ramelton 8mg qd  Tizanidine 2mg    Allergies  crawfish (Anaphylaxis)  Keflex (Anaphylaxis)  Kiwi (Anaphylaxis)  penicillin (Anaphylaxis)  tetracycline (Anaphylaxis)    Vital Signs Last 24 Hrs  T(C): 36.8 (24 Jul 2021 17:42), Max: 36.8 (24 Jul 2021 17:42)  T(F): 98.3 (24 Jul 2021 17:42), Max: 98.3 (24 Jul 2021 17:42)  HR: 105 (24 Jul 2021 17:42) (65 - 105)  BP: 140/72 (24 Jul 2021 17:42) (121/90 - 140/72)  RR: 20 (24 Jul 2021 17:42) (18 - 20)  SpO2: 97% (24 Jul 2021 17:42) (95% - 97%)    Physical exam:  Constitutional: Mild-Mod distress, conversant  Eyes: Anicteric sclerae, moist conjunctivae, see below for CNs  Neck: trachea midline, FROM, supple, no thyromegaly or lymphadenopathy  Cardiovascular: Regular rate and rhythm, no murmurs, rubs, or gallops. No carotid bruits.   Pulmonary: Anterior breath sounds clear bilaterally, no crackles or wheezing. No use of accessory muscles  GI: Abdomen soft, non-distended, non-tender  Extremities: Radial and DP pulses +2, B/L LE 2+ edema    Neurologic:  -Mental status: Awake, alert, oriented to person only. Speech is mildly dysarthric with repetition, and comprehension. Recent and remote memory intact. Follows commands. Attention/concentration intact. Fund of knowledge appropriate.  -Cranial nerves:   II: Visual fields are full to confrontation.  III, IV, VI: Extraocular movements are impaired. Without nystagmus. Pupils equally round and reactive to light.  V: Facial sensation V1-V3 equal and intact on the Left side, Diminished on V1-V3 on the R side.    VII: Face is symmetric with normal eye closure and smile.  VIII: Hearing is bilaterally intact to finger rub  IX, X: Uvula is midline and soft palate rises symmetrically  XI: Head turning toward left. Not able to do shoulder shrug.   XII: Tongue protrudes midline  Motor: bulk and tone decreased. Strength L upper extremity 2/5, RUE 0/5, withdraws to noxious stimuli. Bilateral lower extremities 0/5 withdraws to noxious stimuli.  Sensation: diminished light touch R face. R side neglect/extinction on double simultaneous.  Coordination: Unable to assess for dysmetria on finger-to-nose and heel-to-shin.  Reflexes: mute babinski B/l.   Gait: Unable to assess.     NIHSS: 16  ASPECT Score: 9.   POCT Blood Glucose.: 95 mg/dL (24 Jul 2021 16:29)    LABS:                        11.3   11.44 )-----------( 182      ( 24 Jul 2021 17:03 )             36.1     07-24    139  |  109<H>  |  16  ----------------------------<  125<H>  4.2   |  18<L>  |  1.08    Ca    10.0      24 Jul 2021 17:03    TPro  6.0  /  Alb  2.9<L>  /  TBili  0.4  /  DBili  x   /  AST  16  /  ALT  7<L>  /  AlkPhos  61  07-24    PT/INR - ( 24 Jul 2021 17:03 )   PT: 12.9 sec;   INR: 1.08          PTT - ( 24 Jul 2021 17:03 )  PTT:26.9 sec  CARDIAC MARKERS ( 24 Jul 2021 17:03 )  x     / 0.03 ng/mL / x     / x     / x          RADIOLOGY & ADDITIONAL STUDIES:  CT Head:   No acute intracranial hemorrhage or transcortical infarction.  Status post right frontal craniotomy with subjacent right frontal lobe encephalomalacic changes.    CTA Head:   1. Postoperative changes in the right frontal lobe. Mild leptomeningeal enhancement may be present along the right frontal convexity. An MRI with contrast is recommended to exclude any underlying residual/recurrent malignancy.  2. No large vessel occlusion    CT Angio Neck w/ IV Cont (07.24.21 @ 17:16)  1. Multiple pulmonary metastases  2. Multiple soft tissue metastases  3. No acute vascular abnormality    < from: CT Perfusion w/ Maps w/ IV Cont (07.24.21 @ 17:15) >  IMPRESSION:    1. No acute abnormality.  2. Chronic findings as discussed above.  -----------------------------------------------------------------------------------------------------------------  IV-tPA (Y/N):   N                              Bolus time:    Alteplase Dose Verification w/ RN: N/A  Reason IV-tPA not given: Not given

## 2021-07-25 DIAGNOSIS — M54.9 DORSALGIA, UNSPECIFIED: ICD-10-CM

## 2021-07-25 DIAGNOSIS — F41.9 ANXIETY DISORDER, UNSPECIFIED: ICD-10-CM

## 2021-07-25 DIAGNOSIS — C79.9 SECONDARY MALIGNANT NEOPLASM OF UNSPECIFIED SITE: ICD-10-CM

## 2021-07-25 DIAGNOSIS — G47.00 INSOMNIA, UNSPECIFIED: ICD-10-CM

## 2021-07-25 DIAGNOSIS — R41.82 ALTERED MENTAL STATUS, UNSPECIFIED: ICD-10-CM

## 2021-07-25 DIAGNOSIS — Z98.890 OTHER SPECIFIED POSTPROCEDURAL STATES: Chronic | ICD-10-CM

## 2021-07-25 DIAGNOSIS — R56.9 UNSPECIFIED CONVULSIONS: ICD-10-CM

## 2021-07-25 DIAGNOSIS — R63.8 OTHER SYMPTOMS AND SIGNS CONCERNING FOOD AND FLUID INTAKE: ICD-10-CM

## 2021-07-25 DIAGNOSIS — N17.9 ACUTE KIDNEY FAILURE, UNSPECIFIED: ICD-10-CM

## 2021-07-25 DIAGNOSIS — I26.99 OTHER PULMONARY EMBOLISM WITHOUT ACUTE COR PULMONALE: ICD-10-CM

## 2021-07-25 DIAGNOSIS — I10 ESSENTIAL (PRIMARY) HYPERTENSION: ICD-10-CM

## 2021-07-25 LAB
ALBUMIN SERPL ELPH-MCNC: 3 G/DL — LOW (ref 3.3–5)
ALP SERPL-CCNC: 58 U/L — SIGNIFICANT CHANGE UP (ref 40–120)
ALT FLD-CCNC: 8 U/L — LOW (ref 10–45)
ANION GAP SERPL CALC-SCNC: 15 MMOL/L — SIGNIFICANT CHANGE UP (ref 5–17)
APPEARANCE UR: CLEAR — SIGNIFICANT CHANGE UP
APTT BLD: 33.1 SEC — SIGNIFICANT CHANGE UP (ref 27.5–35.5)
AST SERPL-CCNC: 21 U/L — SIGNIFICANT CHANGE UP (ref 10–40)
BACTERIA # UR AUTO: PRESENT /HPF
BASOPHILS # BLD AUTO: 0.08 K/UL — SIGNIFICANT CHANGE UP (ref 0–0.2)
BASOPHILS NFR BLD AUTO: 0.5 % — SIGNIFICANT CHANGE UP (ref 0–2)
BILIRUB SERPL-MCNC: 0.4 MG/DL — SIGNIFICANT CHANGE UP (ref 0.2–1.2)
BILIRUB UR-MCNC: NEGATIVE — SIGNIFICANT CHANGE UP
BUN SERPL-MCNC: 23 MG/DL — SIGNIFICANT CHANGE UP (ref 7–23)
CALCIUM SERPL-MCNC: 9.9 MG/DL — SIGNIFICANT CHANGE UP (ref 8.4–10.5)
CHLORIDE SERPL-SCNC: 108 MMOL/L — SIGNIFICANT CHANGE UP (ref 96–108)
CO2 SERPL-SCNC: 15 MMOL/L — LOW (ref 22–31)
COLOR SPEC: YELLOW — SIGNIFICANT CHANGE UP
CREAT SERPL-MCNC: 1.28 MG/DL — SIGNIFICANT CHANGE UP (ref 0.5–1.3)
DIFF PNL FLD: ABNORMAL
EOSINOPHIL # BLD AUTO: 0.12 K/UL — SIGNIFICANT CHANGE UP (ref 0–0.5)
EOSINOPHIL NFR BLD AUTO: 0.7 % — SIGNIFICANT CHANGE UP (ref 0–6)
EPI CELLS # UR: SIGNIFICANT CHANGE UP /HPF (ref 0–5)
GLUCOSE BLDC GLUCOMTR-MCNC: 118 MG/DL — HIGH (ref 70–99)
GLUCOSE BLDC GLUCOMTR-MCNC: 87 MG/DL — SIGNIFICANT CHANGE UP (ref 70–99)
GLUCOSE SERPL-MCNC: 124 MG/DL — HIGH (ref 70–99)
GLUCOSE UR QL: NEGATIVE — SIGNIFICANT CHANGE UP
HCT VFR BLD CALC: 34.2 % — LOW (ref 34.5–45)
HGB BLD-MCNC: 11 G/DL — LOW (ref 11.5–15.5)
IMM GRANULOCYTES NFR BLD AUTO: 4.4 % — HIGH (ref 0–1.5)
INR BLD: 1.18 — HIGH (ref 0.88–1.16)
KETONES UR-MCNC: NEGATIVE — SIGNIFICANT CHANGE UP
LACTATE SERPL-SCNC: 1.5 MMOL/L — SIGNIFICANT CHANGE UP (ref 0.5–2)
LACTATE SERPL-SCNC: 2.6 MMOL/L — HIGH (ref 0.5–2)
LEUKOCYTE ESTERASE UR-ACNC: NEGATIVE — SIGNIFICANT CHANGE UP
LYMPHOCYTES # BLD AUTO: 1.85 K/UL — SIGNIFICANT CHANGE UP (ref 1–3.3)
LYMPHOCYTES # BLD AUTO: 11.2 % — LOW (ref 13–44)
MCHC RBC-ENTMCNC: 30.5 PG — SIGNIFICANT CHANGE UP (ref 27–34)
MCHC RBC-ENTMCNC: 32.2 GM/DL — SIGNIFICANT CHANGE UP (ref 32–36)
MCV RBC AUTO: 94.7 FL — SIGNIFICANT CHANGE UP (ref 80–100)
MONOCYTES # BLD AUTO: 0.8 K/UL — SIGNIFICANT CHANGE UP (ref 0–0.9)
MONOCYTES NFR BLD AUTO: 4.9 % — SIGNIFICANT CHANGE UP (ref 2–14)
MRSA PCR RESULT.: NEGATIVE — SIGNIFICANT CHANGE UP
NEUTROPHILS # BLD AUTO: 12.9 K/UL — HIGH (ref 1.8–7.4)
NEUTROPHILS NFR BLD AUTO: 78.3 % — HIGH (ref 43–77)
NITRITE UR-MCNC: NEGATIVE — SIGNIFICANT CHANGE UP
NRBC # BLD: 0 /100 WBCS — SIGNIFICANT CHANGE UP (ref 0–0)
PH UR: 6.5 — SIGNIFICANT CHANGE UP (ref 5–8)
PLATELET # BLD AUTO: 182 K/UL — SIGNIFICANT CHANGE UP (ref 150–400)
POTASSIUM SERPL-MCNC: 3.6 MMOL/L — SIGNIFICANT CHANGE UP (ref 3.5–5.3)
POTASSIUM SERPL-SCNC: 3.6 MMOL/L — SIGNIFICANT CHANGE UP (ref 3.5–5.3)
PROT SERPL-MCNC: 5.6 G/DL — LOW (ref 6–8.3)
PROT UR-MCNC: 100 MG/DL
PROTHROM AB SERPL-ACNC: 14.1 SEC — HIGH (ref 10.6–13.6)
RBC # BLD: 3.61 M/UL — LOW (ref 3.8–5.2)
RBC # FLD: 13.9 % — SIGNIFICANT CHANGE UP (ref 10.3–14.5)
RBC CASTS # UR COMP ASSIST: < 5 /HPF — SIGNIFICANT CHANGE UP
S AUREUS DNA NOSE QL NAA+PROBE: NEGATIVE — SIGNIFICANT CHANGE UP
SODIUM SERPL-SCNC: 138 MMOL/L — SIGNIFICANT CHANGE UP (ref 135–145)
SP GR SPEC: 1.01 — SIGNIFICANT CHANGE UP (ref 1–1.03)
TROPONIN T SERPL-MCNC: 0.07 NG/ML — CRITICAL HIGH (ref 0–0.01)
TROPONIN T SERPL-MCNC: 0.08 NG/ML — CRITICAL HIGH (ref 0–0.01)
UROBILINOGEN FLD QL: 0.2 E.U./DL — SIGNIFICANT CHANGE UP
WBC # BLD: 16.48 K/UL — HIGH (ref 3.8–10.5)
WBC # FLD AUTO: 16.48 K/UL — HIGH (ref 3.8–10.5)
WBC UR QL: ABNORMAL /HPF

## 2021-07-25 PROCEDURE — 99233 SBSQ HOSP IP/OBS HIGH 50: CPT

## 2021-07-25 PROCEDURE — 95720 EEG PHY/QHP EA INCR W/VEEG: CPT

## 2021-07-25 PROCEDURE — 99231 SBSQ HOSP IP/OBS SF/LOW 25: CPT

## 2021-07-25 PROCEDURE — 99223 1ST HOSP IP/OBS HIGH 75: CPT | Mod: GC

## 2021-07-25 RX ORDER — AMLODIPINE BESYLATE 2.5 MG/1
10 TABLET ORAL DAILY
Refills: 0 | Status: DISCONTINUED | OUTPATIENT
Start: 2021-07-25 | End: 2021-07-25

## 2021-07-25 RX ORDER — SODIUM CHLORIDE 9 MG/ML
1000 INJECTION, SOLUTION INTRAVENOUS ONCE
Refills: 0 | Status: COMPLETED | OUTPATIENT
Start: 2021-07-25 | End: 2021-07-25

## 2021-07-25 RX ORDER — MIDODRINE HYDROCHLORIDE 2.5 MG/1
10 TABLET ORAL ONCE
Refills: 0 | Status: DISCONTINUED | OUTPATIENT
Start: 2021-07-25 | End: 2021-07-25

## 2021-07-25 RX ORDER — VANCOMYCIN HCL 1 G
1250 VIAL (EA) INTRAVENOUS EVERY 12 HOURS
Refills: 0 | Status: DISCONTINUED | OUTPATIENT
Start: 2021-07-25 | End: 2021-07-25

## 2021-07-25 RX ORDER — MIDODRINE HYDROCHLORIDE 2.5 MG/1
5 TABLET ORAL THREE TIMES A DAY
Refills: 0 | Status: DISCONTINUED | OUTPATIENT
Start: 2021-07-25 | End: 2021-07-25

## 2021-07-25 RX ORDER — MIRTAZAPINE 45 MG/1
15 TABLET, ORALLY DISINTEGRATING ORAL AT BEDTIME
Refills: 0 | Status: DISCONTINUED | OUTPATIENT
Start: 2021-07-25 | End: 2021-08-01

## 2021-07-25 RX ORDER — SENNA PLUS 8.6 MG/1
1 TABLET ORAL AT BEDTIME
Refills: 0 | Status: DISCONTINUED | OUTPATIENT
Start: 2021-07-25 | End: 2021-08-01

## 2021-07-25 RX ORDER — LEVETIRACETAM 250 MG/1
1000 TABLET, FILM COATED ORAL
Refills: 0 | Status: DISCONTINUED | OUTPATIENT
Start: 2021-07-25 | End: 2021-07-25

## 2021-07-25 RX ORDER — ENOXAPARIN SODIUM 100 MG/ML
80 INJECTION SUBCUTANEOUS EVERY 12 HOURS
Refills: 0 | Status: DISCONTINUED | OUTPATIENT
Start: 2021-07-25 | End: 2021-07-25

## 2021-07-25 RX ORDER — ACETAMINOPHEN 500 MG
1000 TABLET ORAL ONCE
Refills: 0 | Status: COMPLETED | OUTPATIENT
Start: 2021-07-25 | End: 2021-07-25

## 2021-07-25 RX ORDER — MEROPENEM 1 G/30ML
1000 INJECTION INTRAVENOUS ONCE
Refills: 0 | Status: COMPLETED | OUTPATIENT
Start: 2021-07-25 | End: 2021-07-25

## 2021-07-25 RX ORDER — ONDANSETRON 8 MG/1
4 TABLET, FILM COATED ORAL ONCE
Refills: 0 | Status: COMPLETED | OUTPATIENT
Start: 2021-07-25 | End: 2021-07-25

## 2021-07-25 RX ORDER — VANCOMYCIN HCL 1 G
1250 VIAL (EA) INTRAVENOUS ONCE
Refills: 0 | Status: COMPLETED | OUTPATIENT
Start: 2021-07-25 | End: 2021-07-25

## 2021-07-25 RX ORDER — OXYCODONE HYDROCHLORIDE 5 MG/1
5 TABLET ORAL EVERY 4 HOURS
Refills: 0 | Status: DISCONTINUED | OUTPATIENT
Start: 2021-07-25 | End: 2021-07-26

## 2021-07-25 RX ORDER — MIDODRINE HYDROCHLORIDE 2.5 MG/1
5 TABLET ORAL EVERY 8 HOURS
Refills: 0 | Status: DISCONTINUED | OUTPATIENT
Start: 2021-07-25 | End: 2021-07-25

## 2021-07-25 RX ORDER — HYDROMORPHONE HYDROCHLORIDE 2 MG/ML
1 INJECTION INTRAMUSCULAR; INTRAVENOUS; SUBCUTANEOUS EVERY 4 HOURS
Refills: 0 | Status: DISCONTINUED | OUTPATIENT
Start: 2021-07-25 | End: 2021-07-26

## 2021-07-25 RX ORDER — SODIUM CHLORIDE 9 MG/ML
1000 INJECTION, SOLUTION INTRAVENOUS
Refills: 0 | Status: DISCONTINUED | OUTPATIENT
Start: 2021-07-25 | End: 2021-07-26

## 2021-07-25 RX ORDER — MIDODRINE HYDROCHLORIDE 2.5 MG/1
10 TABLET ORAL EVERY 8 HOURS
Refills: 0 | Status: DISCONTINUED | OUTPATIENT
Start: 2021-07-26 | End: 2021-07-29

## 2021-07-25 RX ORDER — MEROPENEM 1 G/30ML
1000 INJECTION INTRAVENOUS EVERY 8 HOURS
Refills: 0 | Status: DISCONTINUED | OUTPATIENT
Start: 2021-07-25 | End: 2021-07-26

## 2021-07-25 RX ORDER — SODIUM CHLORIDE 9 MG/ML
500 INJECTION INTRAMUSCULAR; INTRAVENOUS; SUBCUTANEOUS ONCE
Refills: 0 | Status: COMPLETED | OUTPATIENT
Start: 2021-07-25 | End: 2021-07-25

## 2021-07-25 RX ORDER — SODIUM CHLORIDE 9 MG/ML
500 INJECTION, SOLUTION INTRAVENOUS ONCE
Refills: 0 | Status: COMPLETED | OUTPATIENT
Start: 2021-07-25 | End: 2021-07-25

## 2021-07-25 RX ORDER — GABAPENTIN 400 MG/1
100 CAPSULE ORAL THREE TIMES A DAY
Refills: 0 | Status: DISCONTINUED | OUTPATIENT
Start: 2021-07-25 | End: 2021-07-29

## 2021-07-25 RX ORDER — LANOLIN ALCOHOL/MO/W.PET/CERES
3 CREAM (GRAM) TOPICAL AT BEDTIME
Refills: 0 | Status: DISCONTINUED | OUTPATIENT
Start: 2021-07-25 | End: 2021-08-01

## 2021-07-25 RX ORDER — OXYCODONE HYDROCHLORIDE 5 MG/1
5 TABLET ORAL EVERY 4 HOURS
Refills: 0 | Status: DISCONTINUED | OUTPATIENT
Start: 2021-07-25 | End: 2021-07-25

## 2021-07-25 RX ORDER — VALPROIC ACID (AS SODIUM SALT) 250 MG/5ML
500 SOLUTION, ORAL ORAL EVERY 8 HOURS
Refills: 0 | Status: DISCONTINUED | OUTPATIENT
Start: 2021-07-25 | End: 2021-07-25

## 2021-07-25 RX ORDER — VALPROIC ACID (AS SODIUM SALT) 250 MG/5ML
500 SOLUTION, ORAL ORAL EVERY 12 HOURS
Refills: 0 | Status: DISCONTINUED | OUTPATIENT
Start: 2021-07-25 | End: 2021-07-27

## 2021-07-25 RX ORDER — LABETALOL HCL 100 MG
100 TABLET ORAL
Refills: 0 | Status: DISCONTINUED | OUTPATIENT
Start: 2021-07-25 | End: 2021-07-25

## 2021-07-25 RX ORDER — ENOXAPARIN SODIUM 100 MG/ML
40 INJECTION SUBCUTANEOUS EVERY 24 HOURS
Refills: 0 | Status: DISCONTINUED | OUTPATIENT
Start: 2021-07-25 | End: 2021-07-26

## 2021-07-25 RX ORDER — MIDODRINE HYDROCHLORIDE 2.5 MG/1
5 TABLET ORAL ONCE
Refills: 0 | Status: COMPLETED | OUTPATIENT
Start: 2021-07-25 | End: 2021-07-25

## 2021-07-25 RX ORDER — SENNA PLUS 8.6 MG/1
1 TABLET ORAL AT BEDTIME
Refills: 0 | Status: DISCONTINUED | OUTPATIENT
Start: 2021-07-25 | End: 2021-07-25

## 2021-07-25 RX ORDER — LEVETIRACETAM 250 MG/1
1000 TABLET, FILM COATED ORAL EVERY 12 HOURS
Refills: 0 | Status: DISCONTINUED | OUTPATIENT
Start: 2021-07-25 | End: 2021-08-01

## 2021-07-25 RX ORDER — ACETAMINOPHEN 500 MG
650 TABLET ORAL ONCE
Refills: 0 | Status: COMPLETED | OUTPATIENT
Start: 2021-07-25 | End: 2021-07-25

## 2021-07-25 RX ADMIN — Medication 650 MILLIGRAM(S): at 09:30

## 2021-07-25 RX ADMIN — LEVETIRACETAM 400 MILLIGRAM(S): 250 TABLET, FILM COATED ORAL at 17:59

## 2021-07-25 RX ADMIN — GABAPENTIN 100 MILLIGRAM(S): 400 CAPSULE ORAL at 06:42

## 2021-07-25 RX ADMIN — ENOXAPARIN SODIUM 40 MILLIGRAM(S): 100 INJECTION SUBCUTANEOUS at 10:04

## 2021-07-25 RX ADMIN — Medication 400 MILLIGRAM(S): at 14:15

## 2021-07-25 RX ADMIN — HYDROMORPHONE HYDROCHLORIDE 1 MILLIGRAM(S): 2 INJECTION INTRAMUSCULAR; INTRAVENOUS; SUBCUTANEOUS at 14:30

## 2021-07-25 RX ADMIN — MEROPENEM 100 MILLIGRAM(S): 1 INJECTION INTRAVENOUS at 08:47

## 2021-07-25 RX ADMIN — SODIUM CHLORIDE 1000 MILLILITER(S): 9 INJECTION, SOLUTION INTRAVENOUS at 09:00

## 2021-07-25 RX ADMIN — MEROPENEM 100 MILLIGRAM(S): 1 INJECTION INTRAVENOUS at 17:49

## 2021-07-25 RX ADMIN — ONDANSETRON 4 MILLIGRAM(S): 8 TABLET, FILM COATED ORAL at 10:04

## 2021-07-25 RX ADMIN — Medication 27.5 MILLIGRAM(S): at 22:56

## 2021-07-25 RX ADMIN — Medication 27.5 MILLIGRAM(S): at 10:23

## 2021-07-25 RX ADMIN — Medication 166.67 MILLIGRAM(S): at 10:03

## 2021-07-25 RX ADMIN — MIDODRINE HYDROCHLORIDE 5 MILLIGRAM(S): 2.5 TABLET ORAL at 17:48

## 2021-07-25 RX ADMIN — SODIUM CHLORIDE 500 MILLILITER(S): 9 INJECTION INTRAMUSCULAR; INTRAVENOUS; SUBCUTANEOUS at 17:09

## 2021-07-25 RX ADMIN — SODIUM CHLORIDE 1000 MILLILITER(S): 9 INJECTION, SOLUTION INTRAVENOUS at 08:42

## 2021-07-25 RX ADMIN — Medication 1000 MILLIGRAM(S): at 14:30

## 2021-07-25 RX ADMIN — HYDROMORPHONE HYDROCHLORIDE 1 MILLIGRAM(S): 2 INJECTION INTRAMUSCULAR; INTRAVENOUS; SUBCUTANEOUS at 14:16

## 2021-07-25 RX ADMIN — SODIUM CHLORIDE 1000 MILLILITER(S): 9 INJECTION, SOLUTION INTRAVENOUS at 13:25

## 2021-07-25 RX ADMIN — Medication 650 MILLIGRAM(S): at 08:47

## 2021-07-25 NOTE — PROGRESS NOTE ADULT - ASSESSMENT
Patient Isabella Horowitz is a 75y old Female with PMHx of Melanoma with mets to brain, spine  and multiple organs, HTN, PE (on Enoxaparin), seizure on Keppra secondary to brain mets, S/P craniotomy x2(June 2020, July 2021), shingles (recently treated at [facility]), chronic back pain 2/2 mets who presented to the ED brought in by her daughter with concerns of left side weakness, altered mental status and left side facial droop. Given patients history of seizure recently possibly due to mets the presentation of the patient is possibly due to seizures, as stroke workup was done and is less likely the cause, however will need to follow up with MRI. Also her presentation could be due to polypharmacy as patient is on multiple medications for pain and anxiolytics. Patient is being admitted for monitoring and further management.

## 2021-07-25 NOTE — SWALLOW BEDSIDE ASSESSMENT ADULT - COMMENTS
Per RN, pt's orientation improved to AOx2 today, able to give meds by PO. Per daughter at bedside, pt has presented with increasing unarousable state and sleeping for most of the day. Pt has no prior hx of dysphagia/swallowing concerns per pt's daughter at bedside. On Saturday, when nurse attempted to give meds at night, pt presented with "lock jaw". Pt's daughter additionally reported acute slurred speech. No on-going concerns with speech production prior to admission.

## 2021-07-25 NOTE — CHART NOTE - NSCHARTNOTEFT_GEN_A_CORE
Infectious Diseases Anti-infective Approval Note    Medication:  Meropenem  Dose:  1 gram  Route:  IV  Frequency:  q8hrs  Duration:  3 days     **Duration refers to duration of approval, not duration of recommended treatment     Dose may be adjusted as needed for alterations in renal function.    *THIS IS NOT AN INFECTIOUS DISEASES CONSULTATION*

## 2021-07-25 NOTE — PROGRESS NOTE ADULT - PROBLEM SELECTOR PLAN 4
in the setting of mets to the spine  Home made: Oxycodone 10mg q4 PRN, gabapentin, fentanyl patch   Plan:   - would decrease the dose for now to Oxycodone 5mg PO q4 PRN given patient current condition, as patient is not able to swallow meds would give morphine 2mg IV q4 PRN for pain   - c/w gabapentin 100mg BID PO   - Hold off on fentanyl patch for now   - Patient was given Tizanidine and Flexeril at [facility], would not start for now and monitor for now  - Palliative consult in AM    ADDENDUM: monitor for opioid w/d , pt failed bedside dysphagia, keep NPO, transition PO oxycodone to morphine IV prn for now

## 2021-07-25 NOTE — PROGRESS NOTE ADULT - PROBLEM SELECTOR PLAN 2
ADDENDUM:  pt w/ metastatic melanoma at multiple sites; poor prognosis, will likely need palliative consult, GOC discussion. obtain prior workup and treatment from [facility]

## 2021-07-25 NOTE — PROGRESS NOTE ADULT - PROBLEM SELECTOR PLAN 8
F: none  E: replete to K>4, Mg>2, Phos>2.5  N: Full liquids for now, Speech and swallow eval  Ppx: Lovenox    Code Status: Full, Emergency Contact: [phone], Daughter (Victor Hugo)    Dispo: [facility] Medical Floor

## 2021-07-25 NOTE — PROGRESS NOTE ADULT - PROBLEM SELECTOR PLAN 3
Recent history of PE  Home med: Lovenox 40mg SQ daily  Plan:   - c/w Lovenox 40mg SQ daily    ADDENDUM: weight based lovenox dosing in setting of PE hx

## 2021-07-25 NOTE — PROGRESS NOTE ADULT - ASSESSMENT
Pt is 75 with 75y Female with PMHx of Melanoma with mets to brain, and multiple organs, HTN, PE on Enoxaparin and seizure on Keppra secondary to brain mets, S/P craniotomy x2(June 2020, July 2021) with acute onset of weakness, stroke code activated, initial NIH 16, CTP and CTA unremarkable for acute ischemia or any LVO, symptoms less likely due to stroke therefore patient is not considered as candidate for TPA. Consider seizure secondary to malignancy mets, no need for stroke neurology evaluation at this time, recommend medicine and general neurology consult.     - Continue full dose Lovenox 40mg qd.   - q8hr general neurology checks and vitals.  - obtain MRI Brain with contrast to evaluate for brain mets.  - restart home keppra 1000 mg BID  - continue with depakote 500 mg BID  - ensure vEEG is placed today    Patients neurologist is Dr. Roman: cell phone is [phone], office [phone]    case d/w Dr. Frye

## 2021-07-25 NOTE — SWALLOW BEDSIDE ASSESSMENT ADULT - SLP PERTINENT HISTORY OF CURRENT PROBLEM
Patient is a 75y old Female with PMHx of Melanoma with mets to brain, spine  and multiple organs, HTN, PE (on Enoxaparin), seizure on Keppra secondary to brain mets, S/P craniotomy x2(June 2020, July 2021), shingles (recently treated at [facility]), chronic back pain 2/2 mets who presented to the ED with concerns of left side weakness, altered mental status and left side facial droop. Given patients history of seizure recently possibly due to mets the presentation of the patient is possibly due to seizures, as stroke workup was done and is less likely the cause, however will need to follow up with MRI. Also her presentation could be due to polypharmacy as patient is on multiple medications for pain and anxiolytics. Patient is being admitted for monitoring and further management

## 2021-07-25 NOTE — CONSULT NOTE ADULT - ASSESSMENT
76 y/o F PMH of Melanoma with mets to brain, spine  and multiple organs, HTN, PE (on Enoxaparin), seizure on Keppra secondary to brain mets, S/P craniotomy x2(June 2020, July 2021), shingles (recently treated at [facility]), chronic back pain 2/2 mets who p/w AMS. ICU consulted for hypotension.     #severe sepsis  patient hypotensive however upon evaluation had not yet received full sepsis rescuscitation   74 y/o F PMH of Melanoma with mets to brain, spine  and multiple organs, HTN, PE (on Enoxaparin), seizure on Keppra secondary to brain mets, S/P craniotomy x2(June 2020, July 2021), shingles (recently treated at [facility]), chronic back pain 2/2 mets who p/w AMS. ICU consulted for hypotension.     #severe sepsis  patient hypotensive however upon evaluation had not yet received full sepsis resuscitation; now s/p 30 cc/ kg  continue with broad spectrum abx - vanc/nevaeh to cover possible CAP with risk factors for MDRO given recent hosp  f/u complete fever workup - urine, blood, sputum cx if able, MRSA swab  treat fevers - may need additional IV fluids if continues to have insensible losses due to fevers  monitor uop  repeat lactate and trop   76 y/o F PMH of Melanoma with mets to brain, spine  and multiple organs, HTN, PE (on Enoxaparin), seizure on Keppra secondary to brain mets, S/P craniotomy x2(June 2020, July 2021), shingles (recently treated at [facility]), chronic back pain 2/2 mets who p/w AMS. ICU consulted for hypotension.     #severe sepsis  patient hypotensive however upon evaluation had not yet received full sepsis resuscitation; now s/p 30 cc/ kg  continue with broad spectrum abx - vanc/nevaeh to cover possible CAP with risk factors for MDRO given recent hosp  f/u complete fever workup - urine, blood, sputum cx if able, MRSA swab  treat fevers - may need additional IV fluids if continues to have insensible losses due to fevers  monitor uop  repeat lactate and trop  consider non-infectious causes of fevers although would not expect central fever to cause hypotension   74 y/o F PMH of Melanoma with mets to brain, spine  and multiple organs, HTN, PE (on Enoxaparin), seizure on Keppra secondary to brain mets, S/P craniotomy x2(June 2020, July 2021), shingles (recently treated at [facility]), chronic back pain 2/2 mets who p/w AMS. ICU consulted for hypotension.     #severe sepsis  patient hypotensive however upon evaluation had not yet received full sepsis resuscitation; now s/p 30 cc/ kg  continue with broad spectrum abx - vanc/nevaeh to cover possible CAP with risk factors for MDRO given recent hosp  f/u complete fever workup - urine, blood, sputum cx if able, MRSA swab  treat fevers - may need additional IV fluids if continues to have insensible losses due to fevers  monitor uop  repeat lactate and trop  consider non-infectious causes of fevers although would not expect central fever to cause hypotension    Pt s/e/d with critical care team

## 2021-07-25 NOTE — PROGRESS NOTE ADULT - PROBLEM SELECTOR PLAN 7
Home med: Amlodipine 5mg PO, labetalol 100mg po BID  Blood pressure on the lower end   Plan:   - HOLD amlodipine 5mg PO daily  - HOLD labetalol 100mg po BID

## 2021-07-25 NOTE — PROGRESS NOTE ADULT - SUBJECTIVE AND OBJECTIVE BOX
Patient seen and examined at bedside. Patient noted to be hypotensive, tachycardic, and febrile this AM. Patient put on broad spectrum Abx. Endorsing she has HA, nausea, vomiting, fever. Daughter at bedside states that patient has been on keppra 1000 mg BID (since april 28th).      T(C): 38.3 (07-25-21 @ 08:58), Max: 39.3 (07-25-21 @ 08:00)  HR: 110 (07-25-21 @ 08:58) (65 - 120)  BP: 118/72 (07-25-21 @ 08:58) (78/62 - 140/72)  RR: 20 (07-25-21 @ 08:58) (18 - 20)  SpO2: 99% (07-25-21 @ 08:58) (95% - 99%)  Wt(kg): --Vital Signs Last 24 Hrs  T(C): 38.3 (25 Jul 2021 08:58), Max: 39.3 (25 Jul 2021 08:00)  T(F): 100.9 (25 Jul 2021 08:58), Max: 102.7 (25 Jul 2021 08:00)  HR: 110 (25 Jul 2021 08:58) (65 - 120)  BP: 118/72 (25 Jul 2021 08:58) (78/62 - 140/72)  BP(mean): --  RR: 20 (25 Jul 2021 08:58) (18 - 20)  SpO2: 99% (25 Jul 2021 08:58) (95% - 99%)    Review of Systems:  -All other ROS negative, except those noted in HPI    PHYSICAL EXAM:  Constitutional: Mild-Mod distress, conversant  Eyes: Anicteric sclerae, moist conjunctivae, see below for CNs  Neck: trachea midline, FROM, supple, no thyromegaly or lymphadenopathy  Cardiovascular: Regular rate and rhythm, no murmurs, rubs, or gallops. No carotid bruits.   Pulmonary: Anterior breath sounds clear bilaterally, no crackles or wheezing. No use of accessory muscles  GI: Abdomen soft, non-distended, non-tender  Extremities: Radial and DP pulses +2, B/L LE 2+ edema    Neurologic:  -Mental status: Awake, alert, oriented to person only. Speech is mildly dysarthric with repetition, and comprehension. Recent and remote memory intact. Follows commands. Attention/concentration intact. Fund of knowledge appropriate.  -Cranial nerves:   II: Visual fields are full to confrontation.  III, IV, VI: Extraocular movements are impaired. Without nystagmus. Pupils equally round and reactive to light.  V: Facial sensation V1-V3 equal and intact on the Left side, Diminished on V1-V3 on the R side.    VII: Face is symmetric with normal eye closure and smile.  VIII: Hearing is bilaterally intact to finger rub  IX, X: Uvula is midline and soft palate rises symmetrically  XI: Head turning toward left. Not able to do shoulder shrug.   XII: Tongue protrudes midline  Motor: bulk and tone decreased. Strength L upper extremity 2/5, RUE 0/5,  Bilateral lower extremities 0/5 withdraws to noxious stimuli.  Sensation: diminished light touch R face. R side neglect/extinction on double simultaneous.  Coordination: Unable to assess for dysmetria on finger-to-nose and heel-to-shin.  Gait: Unable to assess.     acetaminophen   Tablet .. 650 milliGRAM(s) Oral every 6 hours PRN  enoxaparin Injectable 40 milliGRAM(s) SubCutaneous every 24 hours  gabapentin 100 milliGRAM(s) Oral three times a day  melatonin 3 milliGRAM(s) Oral at bedtime  mirtazapine 15 milliGRAM(s) Oral at bedtime  oxyCODONE    IR 5 milliGRAM(s) Oral every 4 hours PRN  senna 1 Tablet(s) Oral at bedtime  valproate sodium IVPB 500 milliGRAM(s) IV Intermittent every 8 hours      LABS:                        11.0   16.48 )-----------( 182      ( 25 Jul 2021 08:45 )             34.2     07-25    138  |  108  |  23  ----------------------------<  124<H>  3.6   |  15<L>  |  1.28    Ca    9.9      25 Jul 2021 08:45  Phos  3.0     07-24  Mg     2.2     07-24    TPro  5.6<L>  /  Alb  3.0<L>  /  TBili  0.4  /  DBili  x   /  AST  21  /  ALT  8<L>  /  AlkPhos  58  07-25    PT/INR - ( 25 Jul 2021 08:45 )   PT: 14.1 sec;   INR: 1.18          PTT - ( 25 Jul 2021 08:45 )  PTT:33.1 sec  Urinalysis Basic - ( 25 Jul 2021 08:51 )    Color: x / Appearance: x / SG: x / pH: x  Gluc: x / Ketone: x  / Bili: x / Urobili: x   Blood: x / Protein: x / Nitrite: x   Leuk Esterase: x / RBC: < 5 /HPF / WBC 5-10 /HPF   Sq Epi: x / Non Sq Epi: 0-5 /HPF / Bacteria: Present /HPF      CAPILLARY BLOOD GLUCOSE  95 (24 Jul 2021 19:33)      POCT Blood Glucose.: 87 mg/dL (25 Jul 2021 08:06)  POCT Blood Glucose.: 118 mg/dL (25 Jul 2021 02:10)  POCT Blood Glucose.: 95 mg/dL (24 Jul 2021 16:29)        Urinalysis Basic - ( 25 Jul 2021 08:51 )    Color: x / Appearance: x / SG: x / pH: x  Gluc: x / Ketone: x  / Bili: x / Urobili: x   Blood: x / Protein: x / Nitrite: x   Leuk Esterase: x / RBC: < 5 /HPF / WBC 5-10 /HPF   Sq Epi: x / Non Sq Epi: 0-5 /HPF / Bacteria: Present /HPF

## 2021-07-25 NOTE — PROGRESS NOTE ADULT - ATTENDING COMMENTS
75F w/aforementioned hx intially admitted for AMS. Found to be severely septic after admission  - unclear source; possible post-obstructive PNA (given lung masses) vs ? Will c/w broad spectrum coverage at this time. ID approval obtained. F/u MRSA swab to determine need for continued vancomycin dosing.  - f/u BCx  - ICU consulted in AM when patient hypotensive during febrile episode. Pressures responded with fluid resuscitation; decision made to keep patient on the floor. Will reconsult if needed  - neuro following, recs appreciated    Rest of plan as above.

## 2021-07-25 NOTE — SWALLOW BEDSIDE ASSESSMENT ADULT - SWALLOW EVAL: DIAGNOSIS
D/t pt's unarousable presentation, PO trials were not provided. Will f/u to reassess as mentation improves

## 2021-07-25 NOTE — PROGRESS NOTE ADULT - PROBLEM SELECTOR PLAN 1
Plan: associated with left side weakness, altered mental status and left side facial droop on admission in the setting of  metastasis to brain 2/2 melanoma; would r/o acute CVA; less likely metabolic encephalopathy  VSS  Mild Leucocytosis around 11  CT Chest w/ IV Cont : There is pulmonary metastatic disease and there are metastases involving left supraclavicular and bilateral axillary lymph nodes, in addition to soft tissue metastases in the shoulder musculature bilaterally.Low-density lesion right lobe of liver adjacent to infiltration of subcutaneous fat in the right lateral chest wall. Multinodular thyroid.  CT Head:  No acute intracranial hemorrhage or transcortical infarction. Status post right frontal craniotomy with subjacent right frontal lobe encephalomalacic changes.  Plan:   - q4hr general neurology checks and vitals.  - f/u MRI Brain with contrast to evaluate for brain mets.  - Hold on home Keppra dose  - Loading dose of valproic acid 1500mg ordered  - c/w Valproic acid 500mg Q12   - f/u vEEG  - discussed with On call epileptologist  - Neuro consulted, f/u recs  - STAT CTH if change in mentation     ADDENDUM: obtain UA Plan: associated with left side weakness, altered mental status and left side facial droop on admission in the setting of metastasis to brain 2/2 melanoma; would r/o acute CVA; less likely metabolic encephalopathy VSS. Mild Leucocytosis around 11. CT Chest w/ IV Cont : There is pulmonary metastatic disease and there are metastases involving left supraclavicular and bilateral axillary lymph nodes, in addition to soft tissue metastases in the shoulder musculature bilaterally.Low-density lesion right lobe of liver adjacent to infiltration of subcutaneous fat in the right lateral chest wall. Multinodular thyroid.  CT Head:  No acute intracranial hemorrhage or transcortical infarction. Status post right frontal craniotomy with subjacent right frontal lobe encephalomalacic changes.  Plan:   - q4hr general neurology checks and vitals.  - f/u MRI Brain with contrast to evaluate for brain mets.  - Hold on home Keppra dose  - Loading dose of valproic acid 1500mg ordered  - c/w Valproic acid 500mg Q12   - f/u vEEG  - discussed with On call epileptologist  - Neuro consulted, f/u recs  - STAT CTH if change in mentation     ADDENDUM: obtain UA

## 2021-07-25 NOTE — CONSULT NOTE ADULT - SUBJECTIVE AND OBJECTIVE BOX
ICU CONSULT NOTE    75y old Female with PMHx of Melanoma with mets to brain, spine  and multiple organs, HTN, PE (on Enoxaparin), seizure on Keppra secondary to brain mets, S/P craniotomy x2(June 2020, July 2021), shingles (recently treated at [facility]), chronic back pain 2/2 mets who presented to the ED brought in by her daughter with concerns of left side weakness, altered mental status and left side facial droop. Daughter got concerned and stated that she brought her to the hospital from [facility] where she was discharged few days ago from [facility]. As per the daughter who was present on the bed side stated that her mother looked different to her from her baseline which is she is able to talk and understand things going around her. Stroke called was called as per the chart review, on arrival patient with R hemineglect with head turned toward left, NIHSS 16, she had altered level of consciousness facial palsy, left arm and B/L LE weakness, mild-mod loss of sensation and R hemineglect. As per the daughter her mother recently back in April was found to have seizures with no jerking movement noticed and she went silent and started to stare the wall for few seconds, with no post-ictal confusion, tongue biting, loss of consciousness, bowel or bladder incontinence and she took the patient to [facility] where her neurologist diagnosed her with seizures and started her on Keppra. Otherwise ROS was limited due to patients medical condition as she was AAO x1.  Stroke code called on admission but no e/o stroke and concern for seizure so patient started on valproic acid. On 7/25 patient hypotensive to 70s systolic and tachycardic to 120s. Rectal temp noted to be 102.7 and lactate 2.6. 1L fluids given with minimal improvement in BP so ICU consult called.     Stroke call was called on presentation and as per Neurology less likely stroke is the reason and most [facility] patient had a seizure. started on Valproic acid  7/25: Pt tachy to 120s with BP in 70-80s with rectal 102.7. Lactate 2.6. 2.5L LR given with improvement in HR and BP into the 110s. Tylenol given. Vanc and Malena given for possible HCAP/post obstructive. Abx regimen given due to pcn anaphylaxsis and cephalosporin angioedema. ID apprvoed for malena. Ua negative.  Veeg placed. MRSA swab obtained. Depakote and keppra started per neuro. S/f LLE DVT, LE doppler placed. Pt has IVC in place. Given recent brain mets outpt doctors decreased DVT tx of lovenox from BID dosing to only 40mg QD, will c/w that dosing. MRSA swab sent.         VITAL SIGNS:  Vital Signs Last 24 Hrs  T(C): 38.1 (25 Jul 2021 13:20), Max: 39.3 (25 Jul 2021 08:00)  T(F): 100.5 (25 Jul 2021 13:20), Max: 102.7 (25 Jul 2021 08:00)  HR: 120 (25 Jul 2021 13:20) (65 - 120)  BP: 92/58 (25 Jul 2021 13:20) (78/62 - 140/72)  BP(mean): --  RR: 20 (25 Jul 2021 13:20) (18 - 20)  SpO2: 99% (25 Jul 2021 13:20) (95% - 99%)      PHYSICAL EXAM:    General: WDWN, NAD  HEENT: NC/AT  Cardiovascular: +S1/S2; tachycardic   Respiratory: CTA B/L; no W/R/R  Gastrointestinal: soft, NT/ND; +BSx4  Extremities: WWP; no edema, clubbing or cyanosis  Vascular: 2+ radial, DP/PT pulses B/L  Neurological: AAOx2-3    MEDICATIONS:  MEDICATIONS  (STANDING):  enoxaparin Injectable 40 milliGRAM(s) SubCutaneous every 24 hours  gabapentin 100 milliGRAM(s) Oral three times a day  levETIRAcetam 1000 milliGRAM(s) Oral two times a day  melatonin 3 milliGRAM(s) Oral at bedtime  meropenem  IVPB 1000 milliGRAM(s) IV Intermittent every 8 hours  mirtazapine 15 milliGRAM(s) Oral at bedtime  senna 1 Tablet(s) Oral at bedtime  valproate sodium IVPB 500 milliGRAM(s) IV Intermittent every 12 hours    MEDICATIONS  (PRN):  acetaminophen   Tablet .. 650 milliGRAM(s) Oral every 6 hours PRN Temp greater or equal to 38.5C (101.3F), Mild Pain (1 - 3)  oxyCODONE    IR 5 milliGRAM(s) Oral every 4 hours PRN Severe Pain (7 - 10)      ALLERGIES:  Allergies    crawfish (Anaphylaxis)  Keflex (Anaphylaxis)  Kiwi (Anaphylaxis)  penicillin (Anaphylaxis)  tetracycline (Anaphylaxis)    Intolerances        LABS:                        11.0   16.48 )-----------( 182      ( 25 Jul 2021 08:45 )             34.2     07-25    138  |  108  |  23  ----------------------------<  124<H>  3.6   |  15<L>  |  1.28    Ca    9.9      25 Jul 2021 08:45  Phos  3.0     07-24  Mg     2.2     07-24    TPro  5.6<L>  /  Alb  3.0<L>  /  TBili  0.4  /  DBili  x   /  AST  21  /  ALT  8<L>  /  AlkPhos  58  07-25    PT/INR - ( 25 Jul 2021 08:45 )   PT: 14.1 sec;   INR: 1.18          PTT - ( 25 Jul 2021 08:45 )  PTT:33.1 sec  Urinalysis Basic - ( 25 Jul 2021 08:51 )    Color: x / Appearance: x / SG: x / pH: x  Gluc: x / Ketone: x  / Bili: x / Urobili: x   Blood: x / Protein: x / Nitrite: x   Leuk Esterase: x / RBC: < 5 /HPF / WBC 5-10 /HPF   Sq Epi: x / Non Sq Epi: 0-5 /HPF / Bacteria: Present /HPF      CAPILLARY BLOOD GLUCOSE  95 (24 Jul 2021 19:33)      POCT Blood Glucose.: 87 mg/dL (25 Jul 2021 08:06)        RADIOLOGY & ADDITIONAL TESTS: Reviewed.    ASSESSMENT:    PLAN:    ICU CONSULT NOTE    75y old Female with PMHx of Melanoma with mets to brain, spine  and multiple organs, HTN, PE (on Enoxaparin), seizure on Keppra secondary to brain mets, S/P craniotomy x2(June 2020, July 2021), shingles (recently treated at [facility]), chronic back pain 2/2 mets who presented to the ED brought in by her daughter with concerns of left side weakness, altered mental status and left side facial droop. Daughter got concerned and stated that she brought her to the hospital from [facility] where she was discharged few days ago from [facility]. As per the daughter who was present on the bed side stated that her mother looked different to her from her baseline which is she is able to talk and understand things going around her. Stroke called was called as per the chart review, on arrival patient with R hemineglect with head turned toward left, NIHSS 16, she had altered level of consciousness facial palsy, left arm and B/L LE weakness, mild-mod loss of sensation and R hemineglect. As per the daughter her mother recently back in April was found to have seizures with no jerking movement noticed and she went silent and started to stare the wall for few seconds, with no post-ictal confusion, tongue biting, loss of consciousness, bowel or bladder incontinence and she took the patient to [facility] where her neurologist diagnosed her with seizures and started her on Keppra. Otherwise ROS was limited due to patients medical condition as she was AAO x1.  Stroke code called on admission but no e/o stroke and concern for seizure so patient started on valproic acid. On 7/25 patient hypotensive to 70s systolic and tachycardic to 120s. Rectal temp noted to be 102.7 and lactate 2.6. 1L fluids given with minimal improvement in BP so ICU consult called.       VITAL SIGNS:  Vital Signs Last 24 Hrs  T(C): 38.1 (25 Jul 2021 13:20), Max: 39.3 (25 Jul 2021 08:00)  T(F): 100.5 (25 Jul 2021 13:20), Max: 102.7 (25 Jul 2021 08:00)  HR: 120 (25 Jul 2021 13:20) (65 - 120)  BP: 92/58 (25 Jul 2021 13:20) (78/62 - 140/72)  BP(mean): --  RR: 20 (25 Jul 2021 13:20) (18 - 20)  SpO2: 99% (25 Jul 2021 13:20) (95% - 99%)      PHYSICAL EXAM:    General: WDWN, NAD  HEENT: NC/AT  Cardiovascular: +S1/S2; tachycardic   Respiratory: CTA B/L; no W/R/R  Gastrointestinal: soft, NT/ND; +BSx4  Extremities: WWP; no edema, clubbing or cyanosis  Vascular: 2+ radial, DP/PT pulses B/L  Neurological: AAOx2-3    MEDICATIONS:  MEDICATIONS  (STANDING):  enoxaparin Injectable 40 milliGRAM(s) SubCutaneous every 24 hours  gabapentin 100 milliGRAM(s) Oral three times a day  levETIRAcetam 1000 milliGRAM(s) Oral two times a day  melatonin 3 milliGRAM(s) Oral at bedtime  meropenem  IVPB 1000 milliGRAM(s) IV Intermittent every 8 hours  mirtazapine 15 milliGRAM(s) Oral at bedtime  senna 1 Tablet(s) Oral at bedtime  valproate sodium IVPB 500 milliGRAM(s) IV Intermittent every 12 hours    MEDICATIONS  (PRN):  acetaminophen   Tablet .. 650 milliGRAM(s) Oral every 6 hours PRN Temp greater or equal to 38.5C (101.3F), Mild Pain (1 - 3)  oxyCODONE    IR 5 milliGRAM(s) Oral every 4 hours PRN Severe Pain (7 - 10)      ALLERGIES:  Allergies    crawfish (Anaphylaxis)  Keflex (Anaphylaxis)  Kiwi (Anaphylaxis)  penicillin (Anaphylaxis)  tetracycline (Anaphylaxis)    Intolerances        LABS:                        11.0   16.48 )-----------( 182      ( 25 Jul 2021 08:45 )             34.2     07-25    138  |  108  |  23  ----------------------------<  124<H>  3.6   |  15<L>  |  1.28    Ca    9.9      25 Jul 2021 08:45  Phos  3.0     07-24  Mg     2.2     07-24    TPro  5.6<L>  /  Alb  3.0<L>  /  TBili  0.4  /  DBili  x   /  AST  21  /  ALT  8<L>  /  AlkPhos  58  07-25    PT/INR - ( 25 Jul 2021 08:45 )   PT: 14.1 sec;   INR: 1.18          PTT - ( 25 Jul 2021 08:45 )  PTT:33.1 sec  Urinalysis Basic - ( 25 Jul 2021 08:51 )    Color: x / Appearance: x / SG: x / pH: x  Gluc: x / Ketone: x  / Bili: x / Urobili: x   Blood: x / Protein: x / Nitrite: x   Leuk Esterase: x / RBC: < 5 /HPF / WBC 5-10 /HPF   Sq Epi: x / Non Sq Epi: 0-5 /HPF / Bacteria: Present /HPF      CAPILLARY BLOOD GLUCOSE  95 (24 Jul 2021 19:33)      POCT Blood Glucose.: 87 mg/dL (25 Jul 2021 08:06)        RADIOLOGY & ADDITIONAL TESTS: Reviewed.    ASSESSMENT:    PLAN:    ICU CONSULT NOTE    75y old Female with PMHx of Melanoma with mets to brain, spine  and multiple organs, HTN, PE (on Enoxaparin), seizure on Keppra secondary to brain mets, S/P craniotomy x2(June 2020, July 2021), shingles (recently treated at [facility]), chronic back pain 2/2 mets who presented to the ED brought in by her daughter with concerns of left side weakness, altered mental status and left side facial droop. Daughter got concerned and stated that she brought her to the hospital from [facility] where she was discharged few days ago from [facility]. As per the daughter who was present on the bed side stated that her mother looked different to her from her baseline which is she is able to talk and understand things going around her. Stroke called was called as per the chart review, on arrival patient with R hemineglect with head turned toward left, NIHSS 16, she had altered level of consciousness facial palsy, left arm and B/L LE weakness, mild-mod loss of sensation and R hemineglect. As per the daughter her mother recently back in April was found to have seizures with no jerking movement noticed and she went silent and started to stare the wall for few seconds, with no post-ictal confusion, tongue biting, loss of consciousness, bowel or bladder incontinence and she took the patient to [facility] where her neurologist diagnosed her with seizures and started her on Keppra. Otherwise ROS was limited due to patients medical condition as she was AAO x1.  Stroke code called on admission but no e/o stroke and concern for seizure so patient started on valproic acid. On 7/25 patient hypotensive to 70s systolic and tachycardic to 120s. Rectal temp noted to be 102.7 and lactate 2.6. 1L fluids given with minimal improvement in BP so ICU consult called.       VITAL SIGNS:  Vital Signs Last 24 Hrs  T(C): 38.1 (25 Jul 2021 13:20), Max: 39.3 (25 Jul 2021 08:00)  T(F): 100.5 (25 Jul 2021 13:20), Max: 102.7 (25 Jul 2021 08:00)  HR: 120 (25 Jul 2021 13:20) (65 - 120)  BP: 92/58 (25 Jul 2021 13:20) (78/62 - 140/72)  BP(mean): --  RR: 20 (25 Jul 2021 13:20) (18 - 20)  SpO2: 99% (25 Jul 2021 13:20) (95% - 99%)      PHYSICAL EXAM:    General: WDWN  HEENT: NC/AT  Cardiovascular: +S1/S2; tachycardic   Respiratory: left basilar crackles, no increased WOB  Gastrointestinal: soft, NT/ND; +BSx4  Extremities: RLE cooler than left, b/l trace to 1+ pitting edema  Neurological: AAOx2-3, somewhat lethargic    MEDICATIONS:  MEDICATIONS  (STANDING):  enoxaparin Injectable 40 milliGRAM(s) SubCutaneous every 24 hours  gabapentin 100 milliGRAM(s) Oral three times a day  levETIRAcetam 1000 milliGRAM(s) Oral two times a day  melatonin 3 milliGRAM(s) Oral at bedtime  meropenem  IVPB 1000 milliGRAM(s) IV Intermittent every 8 hours  mirtazapine 15 milliGRAM(s) Oral at bedtime  senna 1 Tablet(s) Oral at bedtime  valproate sodium IVPB 500 milliGRAM(s) IV Intermittent every 12 hours    MEDICATIONS  (PRN):  acetaminophen   Tablet .. 650 milliGRAM(s) Oral every 6 hours PRN Temp greater or equal to 38.5C (101.3F), Mild Pain (1 - 3)  oxyCODONE    IR 5 milliGRAM(s) Oral every 4 hours PRN Severe Pain (7 - 10)      ALLERGIES:  Allergies    crawfish (Anaphylaxis)  Keflex (Anaphylaxis)  Kiwi (Anaphylaxis)  penicillin (Anaphylaxis)  tetracycline (Anaphylaxis)    Intolerances        LABS:                        11.0   16.48 )-----------( 182      ( 25 Jul 2021 08:45 )             34.2     07-25    138  |  108  |  23  ----------------------------<  124<H>  3.6   |  15<L>  |  1.28    Ca    9.9      25 Jul 2021 08:45  Phos  3.0     07-24  Mg     2.2     07-24    TPro  5.6<L>  /  Alb  3.0<L>  /  TBili  0.4  /  DBili  x   /  AST  21  /  ALT  8<L>  /  AlkPhos  58  07-25    PT/INR - ( 25 Jul 2021 08:45 )   PT: 14.1 sec;   INR: 1.18          PTT - ( 25 Jul 2021 08:45 )  PTT:33.1 sec  Urinalysis Basic - ( 25 Jul 2021 08:51 )    Color: x / Appearance: x / SG: x / pH: x  Gluc: x / Ketone: x  / Bili: x / Urobili: x   Blood: x / Protein: x / Nitrite: x   Leuk Esterase: x / RBC: < 5 /HPF / WBC 5-10 /HPF   Sq Epi: x / Non Sq Epi: 0-5 /HPF / Bacteria: Present /HPF      CAPILLARY BLOOD GLUCOSE  95 (24 Jul 2021 19:33)      POCT Blood Glucose.: 87 mg/dL (25 Jul 2021 08:06)        RADIOLOGY & ADDITIONAL TESTS: Reviewed.    ASSESSMENT:    PLAN:

## 2021-07-25 NOTE — PROGRESS NOTE ADULT - SUBJECTIVE AND OBJECTIVE BOX
Internal Medicine Progress Note  Steffen Fisher, PGY-1  Pager: [phone]    ******INCOMPLETE******    Patient is a 75y old  Female who presents with a chief complaint of AMS (25 Jul 2021 11:30)    OVERNIGHT EVENTS/INTERVAL HPI:    REVIEW OF SYSTEMS:  ROS unobtainable 2/2 AMS.    OBJECTIVE:  T(C): 38.1 (07-25-21 @ 13:20), Max: 39.3 (07-25-21 @ 08:00)  HR: 122 (07-25-21 @ 16:43) (94 - 122)  BP: 89/62 (07-25-21 @ 16:43) (78/62 - 118/72)  RR: 18 (07-25-21 @ 16:43) (18 - 20)  SpO2: 92% (07-25-21 @ 16:43) (92% - 99%)  Wt(kg): --  I&O's Summary    25 Jul 2021 07:01  -  25 Jul 2021 17:48  --------------------------------------------------------  IN: 2000 mL / OUT: 700 mL / NET: 1300 mL    Physical Exam:  General: somnolent, laying in bed, in no acute distress. well developed, well nourished  Eyes: EOMI intact bilaterally. Anicteric sclerae, moist conjunctivae  HENT: Moist mucous membranes  Neck: Trachea midline, supple  Lungs: CTA B/L. No wheezes, rales, or ronchi  Cardiovascular: RRR. No murmurs, rubs, or gallops  Abdomen: Soft, non-tender non-distended; No rebound or guarding  Extremities: Normal range of motion, No clubbing, cyanosis or edema. Faint punctate scab on anterior surface of left forearm with faint [place] drawn in ink.  MSK: No midline bony tenderness. No CVA tenderness bilaterally. RLE cool to touch. distal pulses intact.  Neurological: Alert and oriented x3  Skin: Warm and dry. No obvious rash     Medications:  MEDICATIONS  (STANDING):  enoxaparin Injectable 40 milliGRAM(s) SubCutaneous every 24 hours  gabapentin 100 milliGRAM(s) Oral three times a day  lactated ringers. 1000 milliLiter(s) (120 mL/Hr) IV Continuous <Continuous>  levETIRAcetam  IVPB 1000 milliGRAM(s) IV Intermittent every 12 hours  melatonin 3 milliGRAM(s) Oral at bedtime  meropenem  IVPB 1000 milliGRAM(s) IV Intermittent every 8 hours  midodrine 5 milliGRAM(s) Oral once  mirtazapine 15 milliGRAM(s) Oral at bedtime  senna 1 Tablet(s) Oral at bedtime  valproate sodium IVPB 500 milliGRAM(s) IV Intermittent every 12 hours    MEDICATIONS  (PRN):  acetaminophen   Tablet .. 650 milliGRAM(s) Oral every 6 hours PRN Temp greater or equal to 38.5C (101.3F), Mild Pain (1 - 3)  HYDROmorphone  Injectable 1 milliGRAM(s) IV Push every 4 hours PRN Severe Pain (7 - 10)  oxyCODONE    IR 5 milliGRAM(s) Oral every 4 hours PRN Severe Pain (7 - 10)      Labs:                        11.0   16.48 )-----------( 182      ( 25 Jul 2021 08:45 )             34.2     07-25    138  |  108  |  23  ----------------------------<  124<H>  3.6   |  15<L>  |  1.28    Ca    9.9      25 Jul 2021 08:45  Phos  3.0     07-24  Mg     2.2     07-24    TPro  5.6<L>  /  Alb  3.0<L>  /  TBili  0.4  /  DBili  x   /  AST  21  /  ALT  8<L>  /  AlkPhos  58  07-25    PT/INR - ( 25 Jul 2021 08:45 )   PT: 14.1 sec;   INR: 1.18          PTT - ( 25 Jul 2021 08:45 )  PTT:33.1 sec  Urinalysis Basic - ( 25 Jul 2021 08:51 )    Color: x / Appearance: x / SG: x / pH: x  Gluc: x / Ketone: x  / Bili: x / Urobili: x   Blood: x / Protein: x / Nitrite: x   Leuk Esterase: x / RBC: < 5 /HPF / WBC 5-10 /HPF   Sq Epi: x / Non Sq Epi: 0-5 /HPF / Bacteria: Present /HPF      COVID-19 PCR: Negative (24 Jul 2021 18:11)      Radiology: Reviewed Internal Medicine Progress Note  Steffen Fisher, PGY-1  Pager: [phone]    Patient is a 75y old  Female who presents with a chief complaint of AMS (25 Jul 2021 11:30)    OVERNIGHT EVENTS/INTERVAL HPI: As per night team, no overnight events. Patient seen and examined at bedside. Somnolent, laying in bed. Arousable by name.    REVIEW OF SYSTEMS:  ROS unobtainable 2/2 AMS.    OBJECTIVE:  T(C): 38.1 (07-25-21 @ 13:20), Max: 39.3 (07-25-21 @ 08:00)  HR: 122 (07-25-21 @ 16:43) (94 - 122)  BP: 89/62 (07-25-21 @ 16:43) (78/62 - 118/72)  RR: 18 (07-25-21 @ 16:43) (18 - 20)  SpO2: 92% (07-25-21 @ 16:43) (92% - 99%)  Wt(kg): --  I&O's Summary    25 Jul 2021 07:01  -  25 Jul 2021 17:48  --------------------------------------------------------  IN: 2000 mL / OUT: 700 mL / NET: 1300 mL    Physical Exam:  General: somnolent, laying in bed, in no acute distress. well developed, well nourished  Eyes: EOMI intact bilaterally. Anicteric sclerae, moist conjunctivae  HENT: Moist mucous membranes  Neck: Trachea midline, supple  Lungs: CTA B/L. No wheezes, rales, or ronchi  Cardiovascular: Tachycardic. Regular rhythm. No murmurs, rubs, or gallops  Abdomen: Soft, non-tender non-distended; No rebound or guarding  Extremities: Normal range of motion, No clubbing, cyanosis or edema. Faint punctate scab on anterior surface of left forearm with faint [place] drawn in ink.  MSK: No midline bony tenderness. No CVA tenderness bilaterally. RLE cool to touch. distal pulses intact.  Neurological: Alert and oriented x1 (to person)  Skin: Warm and dry. No obvious rash     Medications:  MEDICATIONS  (STANDING):  enoxaparin Injectable 40 milliGRAM(s) SubCutaneous every 24 hours  gabapentin 100 milliGRAM(s) Oral three times a day  lactated ringers. 1000 milliLiter(s) (120 mL/Hr) IV Continuous <Continuous>  levETIRAcetam  IVPB 1000 milliGRAM(s) IV Intermittent every 12 hours  melatonin 3 milliGRAM(s) Oral at bedtime  meropenem  IVPB 1000 milliGRAM(s) IV Intermittent every 8 hours  midodrine 5 milliGRAM(s) Oral once  mirtazapine 15 milliGRAM(s) Oral at bedtime  senna 1 Tablet(s) Oral at bedtime  valproate sodium IVPB 500 milliGRAM(s) IV Intermittent every 12 hours    MEDICATIONS  (PRN):  acetaminophen   Tablet .. 650 milliGRAM(s) Oral every 6 hours PRN Temp greater or equal to 38.5C (101.3F), Mild Pain (1 - 3)  HYDROmorphone  Injectable 1 milliGRAM(s) IV Push every 4 hours PRN Severe Pain (7 - 10)  oxyCODONE    IR 5 milliGRAM(s) Oral every 4 hours PRN Severe Pain (7 - 10)      Labs:                        11.0   16.48 )-----------( 182      ( 25 Jul 2021 08:45 )             34.2     07-25    138  |  108  |  23  ----------------------------<  124<H>  3.6   |  15<L>  |  1.28    Ca    9.9      25 Jul 2021 08:45  Phos  3.0     07-24  Mg     2.2     07-24    TPro  5.6<L>  /  Alb  3.0<L>  /  TBili  0.4  /  DBili  x   /  AST  21  /  ALT  8<L>  /  AlkPhos  58  07-25    PT/INR - ( 25 Jul 2021 08:45 )   PT: 14.1 sec;   INR: 1.18          PTT - ( 25 Jul 2021 08:45 )  PTT:33.1 sec  Urinalysis Basic - ( 25 Jul 2021 08:51 )    Color: x / Appearance: x / SG: x / pH: x  Gluc: x / Ketone: x  / Bili: x / Urobili: x   Blood: x / Protein: x / Nitrite: x   Leuk Esterase: x / RBC: < 5 /HPF / WBC 5-10 /HPF   Sq Epi: x / Non Sq Epi: 0-5 /HPF / Bacteria: Present /HPF      COVID-19 PCR: Negative (24 Jul 2021 18:11)      Radiology: Reviewed

## 2021-07-26 DIAGNOSIS — B02.30 ZOSTER OCULAR DISEASE, UNSPECIFIED: ICD-10-CM

## 2021-07-26 DIAGNOSIS — Z71.89 OTHER SPECIFIED COUNSELING: ICD-10-CM

## 2021-07-26 DIAGNOSIS — G93.49 OTHER ENCEPHALOPATHY: ICD-10-CM

## 2021-07-26 DIAGNOSIS — Z87.898 PERSONAL HISTORY OF OTHER SPECIFIED CONDITIONS: ICD-10-CM

## 2021-07-26 DIAGNOSIS — G89.3 NEOPLASM RELATED PAIN (ACUTE) (CHRONIC): ICD-10-CM

## 2021-07-26 DIAGNOSIS — Z86.711 PERSONAL HISTORY OF PULMONARY EMBOLISM: ICD-10-CM

## 2021-07-26 DIAGNOSIS — Z78.9 OTHER SPECIFIED HEALTH STATUS: ICD-10-CM

## 2021-07-26 DIAGNOSIS — Z51.5 ENCOUNTER FOR PALLIATIVE CARE: ICD-10-CM

## 2021-07-26 DIAGNOSIS — I21.4 NON-ST ELEVATION (NSTEMI) MYOCARDIAL INFARCTION: ICD-10-CM

## 2021-07-26 DIAGNOSIS — R53.2 FUNCTIONAL QUADRIPLEGIA: ICD-10-CM

## 2021-07-26 LAB
ALBUMIN SERPL ELPH-MCNC: 2.2 G/DL — LOW (ref 3.3–5)
ALBUMIN SERPL ELPH-MCNC: 2.3 G/DL — LOW (ref 3.3–5)
ALP SERPL-CCNC: 62 U/L — SIGNIFICANT CHANGE UP (ref 40–120)
ALP SERPL-CCNC: 85 U/L — SIGNIFICANT CHANGE UP (ref 40–120)
ALT FLD-CCNC: 7 U/L — LOW (ref 10–45)
ALT FLD-CCNC: 8 U/L — LOW (ref 10–45)
ANION GAP SERPL CALC-SCNC: 11 MMOL/L — SIGNIFICANT CHANGE UP (ref 5–17)
ANION GAP SERPL CALC-SCNC: 12 MMOL/L — SIGNIFICANT CHANGE UP (ref 5–17)
AST SERPL-CCNC: 20 U/L — SIGNIFICANT CHANGE UP (ref 10–40)
AST SERPL-CCNC: 21 U/L — SIGNIFICANT CHANGE UP (ref 10–40)
BASOPHILS # BLD AUTO: 0.06 K/UL — SIGNIFICANT CHANGE UP (ref 0–0.2)
BASOPHILS NFR BLD AUTO: 0.4 % — SIGNIFICANT CHANGE UP (ref 0–2)
BILIRUB SERPL-MCNC: 0.3 MG/DL — SIGNIFICANT CHANGE UP (ref 0.2–1.2)
BILIRUB SERPL-MCNC: 0.3 MG/DL — SIGNIFICANT CHANGE UP (ref 0.2–1.2)
BUN SERPL-MCNC: 21 MG/DL — SIGNIFICANT CHANGE UP (ref 7–23)
BUN SERPL-MCNC: 23 MG/DL — SIGNIFICANT CHANGE UP (ref 7–23)
CALCIUM SERPL-MCNC: 9.2 MG/DL — SIGNIFICANT CHANGE UP (ref 8.4–10.5)
CALCIUM SERPL-MCNC: 9.6 MG/DL — SIGNIFICANT CHANGE UP (ref 8.4–10.5)
CHLORIDE SERPL-SCNC: 110 MMOL/L — HIGH (ref 96–108)
CHLORIDE SERPL-SCNC: 110 MMOL/L — HIGH (ref 96–108)
CO2 SERPL-SCNC: 16 MMOL/L — LOW (ref 22–31)
CO2 SERPL-SCNC: 19 MMOL/L — LOW (ref 22–31)
CREAT SERPL-MCNC: 1.04 MG/DL — SIGNIFICANT CHANGE UP (ref 0.5–1.3)
CREAT SERPL-MCNC: 1.2 MG/DL — SIGNIFICANT CHANGE UP (ref 0.5–1.3)
EOSINOPHIL # BLD AUTO: 0.17 K/UL — SIGNIFICANT CHANGE UP (ref 0–0.5)
EOSINOPHIL NFR BLD AUTO: 1.3 % — SIGNIFICANT CHANGE UP (ref 0–6)
GLUCOSE SERPL-MCNC: 102 MG/DL — HIGH (ref 70–99)
GLUCOSE SERPL-MCNC: 94 MG/DL — SIGNIFICANT CHANGE UP (ref 70–99)
HCOV PNL SPEC NAA+PROBE: DETECTED
HCT VFR BLD CALC: 30.3 % — LOW (ref 34.5–45)
HCT VFR BLD CALC: 32.6 % — LOW (ref 34.5–45)
HGB BLD-MCNC: 9.4 G/DL — LOW (ref 11.5–15.5)
HGB BLD-MCNC: 9.6 G/DL — LOW (ref 11.5–15.5)
IMM GRANULOCYTES NFR BLD AUTO: 1.5 % — SIGNIFICANT CHANGE UP (ref 0–1.5)
LACTATE SERPL-SCNC: 1.1 MMOL/L — SIGNIFICANT CHANGE UP (ref 0.5–2)
LYMPHOCYTES # BLD AUTO: 0.85 K/UL — LOW (ref 1–3.3)
LYMPHOCYTES # BLD AUTO: 6.3 % — LOW (ref 13–44)
MAGNESIUM SERPL-MCNC: 2.1 MG/DL — SIGNIFICANT CHANGE UP (ref 1.6–2.6)
MAGNESIUM SERPL-MCNC: 2.1 MG/DL — SIGNIFICANT CHANGE UP (ref 1.6–2.6)
MCHC RBC-ENTMCNC: 28.7 PG — SIGNIFICANT CHANGE UP (ref 27–34)
MCHC RBC-ENTMCNC: 29.4 GM/DL — LOW (ref 32–36)
MCHC RBC-ENTMCNC: 30.2 PG — SIGNIFICANT CHANGE UP (ref 27–34)
MCHC RBC-ENTMCNC: 31 GM/DL — LOW (ref 32–36)
MCV RBC AUTO: 97.3 FL — SIGNIFICANT CHANGE UP (ref 80–100)
MCV RBC AUTO: 97.4 FL — SIGNIFICANT CHANGE UP (ref 80–100)
MONOCYTES # BLD AUTO: 0.62 K/UL — SIGNIFICANT CHANGE UP (ref 0–0.9)
MONOCYTES NFR BLD AUTO: 4.6 % — SIGNIFICANT CHANGE UP (ref 2–14)
NEUTROPHILS # BLD AUTO: 11.6 K/UL — HIGH (ref 1.8–7.4)
NEUTROPHILS NFR BLD AUTO: 85.9 % — HIGH (ref 43–77)
NRBC # BLD: 0 /100 WBCS — SIGNIFICANT CHANGE UP (ref 0–0)
NRBC # BLD: 0 /100 WBCS — SIGNIFICANT CHANGE UP (ref 0–0)
PHOSPHATE SERPL-MCNC: 3.9 MG/DL — SIGNIFICANT CHANGE UP (ref 2.5–4.5)
PLATELET # BLD AUTO: 145 K/UL — LOW (ref 150–400)
PLATELET # BLD AUTO: 146 K/UL — LOW (ref 150–400)
POTASSIUM SERPL-MCNC: 3.2 MMOL/L — LOW (ref 3.5–5.3)
POTASSIUM SERPL-MCNC: 3.6 MMOL/L — SIGNIFICANT CHANGE UP (ref 3.5–5.3)
POTASSIUM SERPL-SCNC: 3.2 MMOL/L — LOW (ref 3.5–5.3)
POTASSIUM SERPL-SCNC: 3.6 MMOL/L — SIGNIFICANT CHANGE UP (ref 3.5–5.3)
PROT SERPL-MCNC: 4.9 G/DL — LOW (ref 6–8.3)
PROT SERPL-MCNC: 5.2 G/DL — LOW (ref 6–8.3)
RAPID RVP RESULT: DETECTED
RBC # BLD: 3.11 M/UL — LOW (ref 3.8–5.2)
RBC # BLD: 3.35 M/UL — LOW (ref 3.8–5.2)
RBC # FLD: 14 % — SIGNIFICANT CHANGE UP (ref 10.3–14.5)
RBC # FLD: 14 % — SIGNIFICANT CHANGE UP (ref 10.3–14.5)
SARS-COV-2 RNA SPEC QL NAA+PROBE: SIGNIFICANT CHANGE UP
SODIUM SERPL-SCNC: 138 MMOL/L — SIGNIFICANT CHANGE UP (ref 135–145)
SODIUM SERPL-SCNC: 140 MMOL/L — SIGNIFICANT CHANGE UP (ref 135–145)
TROPONIN T SERPL-MCNC: 0.04 NG/ML — CRITICAL HIGH (ref 0–0.01)
WBC # BLD: 11.6 K/UL — HIGH (ref 3.8–10.5)
WBC # BLD: 13.5 K/UL — HIGH (ref 3.8–10.5)
WBC # FLD AUTO: 11.6 K/UL — HIGH (ref 3.8–10.5)
WBC # FLD AUTO: 13.5 K/UL — HIGH (ref 3.8–10.5)

## 2021-07-26 PROCEDURE — 99223 1ST HOSP IP/OBS HIGH 75: CPT

## 2021-07-26 PROCEDURE — 95720 EEG PHY/QHP EA INCR W/VEEG: CPT

## 2021-07-26 PROCEDURE — 99233 SBSQ HOSP IP/OBS HIGH 50: CPT | Mod: GC

## 2021-07-26 PROCEDURE — 99222 1ST HOSP IP/OBS MODERATE 55: CPT | Mod: GC

## 2021-07-26 PROCEDURE — 99233 SBSQ HOSP IP/OBS HIGH 50: CPT

## 2021-07-26 RX ORDER — ACYCLOVIR SODIUM 500 MG
800 VIAL (EA) INTRAVENOUS EVERY 12 HOURS
Refills: 0 | Status: DISCONTINUED | OUTPATIENT
Start: 2021-07-26 | End: 2021-07-28

## 2021-07-26 RX ORDER — ACETAMINOPHEN 500 MG
1000 TABLET ORAL ONCE
Refills: 0 | Status: COMPLETED | OUTPATIENT
Start: 2021-07-26 | End: 2021-07-26

## 2021-07-26 RX ORDER — MORPHINE SULFATE 50 MG/1
4 CAPSULE, EXTENDED RELEASE ORAL ONCE
Refills: 0 | Status: DISCONTINUED | OUTPATIENT
Start: 2021-07-26 | End: 2021-07-26

## 2021-07-26 RX ORDER — POTASSIUM CHLORIDE 20 MEQ
40 PACKET (EA) ORAL EVERY 4 HOURS
Refills: 0 | Status: COMPLETED | OUTPATIENT
Start: 2021-07-26 | End: 2021-07-26

## 2021-07-26 RX ORDER — HYDROCORTISONE 1 %
1 OINTMENT (GRAM) TOPICAL
Refills: 0 | Status: DISCONTINUED | OUTPATIENT
Start: 2021-07-26 | End: 2021-07-26

## 2021-07-26 RX ORDER — HYDROCORTISONE 1 %
1 OINTMENT (GRAM) TOPICAL THREE TIMES A DAY
Refills: 0 | Status: DISCONTINUED | OUTPATIENT
Start: 2021-07-26 | End: 2021-08-01

## 2021-07-26 RX ORDER — OXYCODONE HYDROCHLORIDE 5 MG/1
10 TABLET ORAL ONCE
Refills: 0 | Status: DISCONTINUED | OUTPATIENT
Start: 2021-07-26 | End: 2021-07-26

## 2021-07-26 RX ORDER — MEROPENEM 1 G/30ML
2000 INJECTION INTRAVENOUS EVERY 12 HOURS
Refills: 0 | Status: DISCONTINUED | OUTPATIENT
Start: 2021-07-26 | End: 2021-07-27

## 2021-07-26 RX ORDER — SODIUM CHLORIDE 9 MG/ML
1000 INJECTION, SOLUTION INTRAVENOUS ONCE
Refills: 0 | Status: COMPLETED | OUTPATIENT
Start: 2021-07-26 | End: 2021-07-26

## 2021-07-26 RX ADMIN — LEVETIRACETAM 400 MILLIGRAM(S): 250 TABLET, FILM COATED ORAL at 06:25

## 2021-07-26 RX ADMIN — GABAPENTIN 100 MILLIGRAM(S): 400 CAPSULE ORAL at 06:25

## 2021-07-26 RX ADMIN — Medication 40 MILLIEQUIVALENT(S): at 22:28

## 2021-07-26 RX ADMIN — ENOXAPARIN SODIUM 40 MILLIGRAM(S): 100 INJECTION SUBCUTANEOUS at 09:08

## 2021-07-26 RX ADMIN — MEROPENEM 100 MILLIGRAM(S): 1 INJECTION INTRAVENOUS at 09:08

## 2021-07-26 RX ADMIN — Medication 40 MILLIEQUIVALENT(S): at 18:21

## 2021-07-26 RX ADMIN — Medication 266 MILLIGRAM(S): at 17:33

## 2021-07-26 RX ADMIN — Medication 400 MILLIGRAM(S): at 21:51

## 2021-07-26 RX ADMIN — Medication 27.5 MILLIGRAM(S): at 10:06

## 2021-07-26 RX ADMIN — Medication 1000 MILLIGRAM(S): at 22:51

## 2021-07-26 RX ADMIN — MIRTAZAPINE 15 MILLIGRAM(S): 45 TABLET, ORALLY DISINTEGRATING ORAL at 22:27

## 2021-07-26 RX ADMIN — MEROPENEM 500 MILLIGRAM(S): 1 INJECTION INTRAVENOUS at 18:21

## 2021-07-26 RX ADMIN — Medication 1000 MILLIGRAM(S): at 13:00

## 2021-07-26 RX ADMIN — Medication 400 MILLIGRAM(S): at 12:27

## 2021-07-26 RX ADMIN — MORPHINE SULFATE 4 MILLIGRAM(S): 50 CAPSULE, EXTENDED RELEASE ORAL at 13:00

## 2021-07-26 RX ADMIN — Medication 27.5 MILLIGRAM(S): at 22:29

## 2021-07-26 RX ADMIN — MIDODRINE HYDROCHLORIDE 10 MILLIGRAM(S): 2.5 TABLET ORAL at 17:08

## 2021-07-26 RX ADMIN — SODIUM CHLORIDE 1000 MILLILITER(S): 9 INJECTION, SOLUTION INTRAVENOUS at 11:48

## 2021-07-26 RX ADMIN — GABAPENTIN 100 MILLIGRAM(S): 400 CAPSULE ORAL at 13:16

## 2021-07-26 RX ADMIN — SENNA PLUS 1 TABLET(S): 8.6 TABLET ORAL at 22:27

## 2021-07-26 RX ADMIN — GABAPENTIN 100 MILLIGRAM(S): 400 CAPSULE ORAL at 22:27

## 2021-07-26 RX ADMIN — Medication 3 MILLIGRAM(S): at 22:27

## 2021-07-26 RX ADMIN — LEVETIRACETAM 400 MILLIGRAM(S): 250 TABLET, FILM COATED ORAL at 17:08

## 2021-07-26 RX ADMIN — MIDODRINE HYDROCHLORIDE 10 MILLIGRAM(S): 2.5 TABLET ORAL at 09:07

## 2021-07-26 RX ADMIN — MEROPENEM 100 MILLIGRAM(S): 1 INJECTION INTRAVENOUS at 00:36

## 2021-07-26 RX ADMIN — MORPHINE SULFATE 4 MILLIGRAM(S): 50 CAPSULE, EXTENDED RELEASE ORAL at 12:31

## 2021-07-26 NOTE — PROGRESS NOTE ADULT - PROBLEM SELECTOR PLAN 4
in the setting of mets to the spine  Home made: Oxycodone 10mg q4 PRN, gabapentin, fentanyl patch   Plan:   - would decrease the dose for now to Oxycodone 5mg PO q4 PRN given patient current condition, as patient is not able to swallow meds would give morphine 2mg IV q4 PRN for pain   - c/w gabapentin 100mg BID PO   - Hold off on fentanyl patch for now   - Patient was given Tizanidine and Flexeril at [facility], would not start for now and monitor for now  - Palliative consult in AM    ADDENDUM: monitor for opioid w/d , pt failed bedside dysphagia, keep NPO, transition PO oxycodone to morphine IV prn for now Recent history of PE  Home med: Lovenox 40mg SQ daily  Plan:   - c/w Lovenox 40mg SQ daily    ADDENDUM: weight based lovenox dosing in setting of PE hx On outpatient fentanyl and oxycodone. Hypotension currently limited ability to restart home regimen but will at a lower dose once her BP allows for it, monitor for withdrawal symptoms in the meantime.  - c/w gabapentin 100mg PO TID   - monitor for opioid w/d  - transition PO oxycodone to morphine 2mg IV q4h prn for now  - palliative consulted, appreciate recs

## 2021-07-26 NOTE — PROGRESS NOTE ADULT - PROBLEM SELECTOR PLAN 2
ADDENDUM:  pt w/ metastatic melanoma at multiple sites; poor prognosis, will likely need palliative consult, GOC discussion. obtain prior workup and treatment from [facility] Plan: associated with left side weakness, AMS and left side facial droop on admission in the setting of metastasis to brain 2/2 melanoma; less likely metabolic encephalopathy VSS. Mild Leucocytosis around 11.     CT brain negative for acute hemorrhage or infarction. S/p right frontal craniotomy with subjacent right frontal lobe encephalomalacic changes.    CT Chest w/ IV Cont : There is pulmonary metastatic disease and there are metastases involving left supraclavicular and bilateral axillary lymph nodes, in addition to soft tissue metastases in the shoulder musculature bilaterally. Low-density lesion right lobe of liver adjacent to infiltration of subcutaneous fat in the right lateral chest wall. Multinodular thyroid.  CT Head:  No acute intracranial hemorrhage or transcortical infarction.   Plan:   - q4hr general neurology checks and vitals.  - f/u MRI Brain with contrast to evaluate for brain mets.  - Hold on home Keppra dose  - Loading dose of valproic acid 1500mg ordered  - c/w Valproic acid 500mg Q12   - f/u vEEG  - discussed with On call epileptologist  - Neuro consulted, f/u recs  - STAT CTH if change in mentation     ADDENDUM: obtain UA Associated with left side weakness and left side facial droop on admission in the setting of metastasis to brain 2/2 melanoma; less likely metabolic encephalopathy VSS. Mild Leucocytosis around 11. CT brain negative for acute hemorrhage or infarction.  - q4hr general neurology checks and vitals  - f/u MRI Brain with contrast to evaluate for brain mets  - c/w keppra 1000mg IV q12h  - c/w Valproic acid 500mg IV q12h  - f/u vEEG  - discussed with On call epileptologist  - Neuro consulted, f/u recs  - STAT CTH if change in mentation     ADDENDUM: obtain UA

## 2021-07-26 NOTE — PROGRESS NOTE ADULT - PROBLEM SELECTOR PLAN 1
Plan: associated with left side weakness, altered mental status and left side facial droop on admission in the setting of metastasis to brain 2/2 melanoma; would r/o acute CVA; less likely metabolic encephalopathy VSS. Mild Leucocytosis around 11. CT Chest w/ IV Cont : There is pulmonary metastatic disease and there are metastases involving left supraclavicular and bilateral axillary lymph nodes, in addition to soft tissue metastases in the shoulder musculature bilaterally.Low-density lesion right lobe of liver adjacent to infiltration of subcutaneous fat in the right lateral chest wall. Multinodular thyroid.  CT Head:  No acute intracranial hemorrhage or transcortical infarction. Status post right frontal craniotomy with subjacent right frontal lobe encephalomalacic changes.  Plan:   - q4hr general neurology checks and vitals.  - f/u MRI Brain with contrast to evaluate for brain mets.  - Hold on home Keppra dose  - Loading dose of valproic acid 1500mg ordered  - c/w Valproic acid 500mg Q12   - f/u vEEG  - discussed with On call epileptologist  - Neuro consulted, f/u recs  - STAT CTH if change in mentation     ADDENDUM: obtain UA Pt found to meet criteria for severe sepsis following admission with unknown source of infection. T 102.7, , BP 78/62, WBC 16.4, lactate 2.6. Possibly HCAP vs. post-obstructive PNA vs. central fevers 2/2 high tumor burden. Pt found to meet criteria for severe sepsis following admission with unknown source of infection. T 102.7, , BP 78/62, WBC 16.4, lactate 2.6. Possibly HCAP vs. post-obstructive PNA vs. central fevers 2/2 high tumor burden.    #Sepsis - source remains unclear; MRSA swab negative;  - c/w Malena for now  - ID consult to assist (would risk stratify possibility of encephalitis given recent aforementioned infection, ?LP)  - need RVP  - receiving IVF bolus this AM for hypotension  - low threshold to call ICU triage as still hypotensive and febrile

## 2021-07-26 NOTE — CONSULT NOTE ADULT - NSPROBSELRECBLANK_7_GEN
Appt scheduled for 540pm today. Note says patient has discomfort with urination. Will enter UA order. LM for patient to check in at 4th floor lab desk and that we need her to have the lab done before her appointment.  Asked patient to arrive at 5-510pm if possible.  If she comes later and the 4th floor lab is closed, will collect in clinic and send to 1st floor lab at hospital.   DISPLAY PLAN FREE TEXT

## 2021-07-26 NOTE — CONSULT NOTE ADULT - SUBJECTIVE AND OBJECTIVE BOX
Consultation Requested by:    Patient is a 75y old  Female who presents with a chief complaint of AMS (26 Jul 2021 14:09)    HPI:  Patient Isabella Horowitz is a 75y old Female with PMHx of Melanoma with mets to brain, spine  and multiple organs, HTN, PE (on Enoxaparin), seizure on Keppra secondary to brain mets, S/P craniotomy x2(June 2020, July 2021), shingles (recently treated at [facility]), chronic back pain 2/2 mets who presented to the ED brought in by her daughter with concerns of left side weakness, altered mental status and left side facial droop. Daughter got concerned and stated that she brought her to the hospital from [facility] where she was discharged few days ago from [facility]. As per the daughter who was present on the bed side stated that her mother looked different to her from her baseline which is she is able to talk and understand things going around her. Stroke called was called as per the chart review, on arrival patient with R hemineglect with head turned toward left, NIHSS 16, she had altered level of consciousness facial palsy, left arm and B/L LE weakness, mild-mod loss of sensation and R hemineglect. As per the daughter her mother recently back in April was found to have seizures with no jerking movement noticed and she went silent and started to stare the wall for few seconds, with no post-ictal confusion, tongue biting, loss of consciousness, bowel or bladder incontinence and she took the patient to [facility] where her neurologist diagnosed her with seizures and started her on Keppra. Otherwise ROS was limited due to patients medical condition as she was AAO x1 during my encounter in the ED. ID consulted due to concern for encephalitis    Patient seen and examined at bedside. States she feels better compared to admission. Denies headache, neck pain/stiffness, fevers, abdominal pain, dysuria, urinary frequency.     REVIEW OF SYSTEMS  All review of systems negative, except for those marked:  General:		[] Abnormal:  	[] Night Sweats		[] Fever		[] Weight Loss  Pulmonary/Cough:	[] Abnormal:  Cardiac/Chest Pain:	[] Abnormal:  Gastrointestinal:   	[] Abnormal:  Eyes:			        [] Abnormal:  ENT:		         	[] Abnormal:  Dysuria:		                [] Abnormal:  Musculoskeletal	:	[] Abnormal:  Endocrine:		        [] Abnormal:  Lymph Nodes:		[] Abnormal:  Headache:		        [] Abnormal:  Skin:			        [] Abnormal:  Allergy/Immune:       	[] Abnormal:  Psychiatric:		        [] Abnormal:  [] All other review of systems negative  [] Unable to obtain (explain):    Recent Ill Contacts:	[] No	[] Yes:  Recent Travel History:	[] No	[] Yes:  Recent Animal/Insect Exposure/Tick Bites:	[] No	[] Yes:    Allergies    crawfish (Anaphylaxis)  Keflex (Anaphylaxis)  Kiwi (Anaphylaxis)  penicillin (Anaphylaxis)  tetracycline (Anaphylaxis)    Intolerances      Antimicrobials:  acyclovir IVPB 800 milliGRAM(s) IV Intermittent every 12 hours  meropenem  IVPB 2000 milliGRAM(s) IV Intermittent every 12 hours      Other Medications:  acetaminophen   Tablet .. 650 milliGRAM(s) Oral every 6 hours PRN  enoxaparin Injectable 40 milliGRAM(s) SubCutaneous every 24 hours  gabapentin 100 milliGRAM(s) Oral three times a day  hydrocortisone 2.5% Ointment 1 Application(s) Topical three times a day PRN  lactated ringers. 1000 milliLiter(s) IV Continuous <Continuous>  levETIRAcetam  IVPB 1000 milliGRAM(s) IV Intermittent every 12 hours  melatonin 3 milliGRAM(s) Oral at bedtime  midodrine 10 milliGRAM(s) Oral every 8 hours  mirtazapine 15 milliGRAM(s) Oral at bedtime  senna 1 Tablet(s) Oral at bedtime  valproate sodium IVPB 500 milliGRAM(s) IV Intermittent every 12 hours      FAMILY HISTORY:  No pertinent family history in first degree relatives      PAST MEDICAL & SURGICAL HISTORY:  Hypertension    Pulmonary embolism    Melanoma    Shingles    Chronic back pain    Seizures    H/O craniotomy      SOCIAL HISTORY:    IMMUNIZATIONS  [] Up to Date		[] Not Up to Date:  Recent Immunizations:	[] No	[] Yes:    .  VITAL SIGNS:  T(C): 36.9 (07-26-21 @ 14:05), Max: 39.4 (07-26-21 @ 11:46)  T(F): 98.5 (07-26-21 @ 14:05), Max: 103 (07-26-21 @ 11:46)  HR: 104 (07-26-21 @ 14:05) (103 - 130)  BP: 103/65 (07-26-21 @ 14:05) (83/55 - 115/83)  BP(mean): --  RR: 16 (07-26-21 @ 14:05) (16 - 20)  SpO2: 96% (07-26-21 @ 14:05) (92% - 98%)  Wt(kg): --    PHYSICAL EXAM:    Constitutional: WDWN resting comfortably in bed; NAD  Eyes: PERRL, EOMI, anicteric sclera  ENT: no nasal discharge; uvula midline, no oropharyngeal erythema or exudates; MMM  Neck: supple; no JVD or thyromegaly  Respiratory: CTA B/L; no W/R/R, no retractions  Cardiac: +S1/S2; RRR; no M/R/G; PMI non-displaced  Gastrointestinal: abdomen soft, NT/ND; no rebound or guarding; +BSx4  Back: spine midline, no bony tenderness or step-offs; no CVAT B/L  Extremities: WWP, no clubbing or cyanosis; no peripheral edema  Musculoskeletal: NROM x4; no joint swelling, tenderness or erythema  Vascular: 2+ radial, femoral, DP/PT pulses B/L  Dermatologic: skin warm, dry and intact; no rashes, wounds, or scars  Lymphatic: no submandibular or cervical LAD  Neurologic: AAOx3; CNII-XII grossly intact; no focal deficits. No meningeal signs    Lab Results:                        9.4    11.60 )-----------( 146      ( 26 Jul 2021 15:29 )             30.3     07-26    140  |  110<H>  |  21  ----------------------------<  102<H>  3.2<L>   |  19<L>  |  1.04    Ca    9.2      26 Jul 2021 15:29  Phos  3.9     07-26  Mg     2.1     07-26    TPro  4.9<L>  /  Alb  2.3<L>  /  TBili  0.3  /  DBili  x   /  AST  20  /  ALT  8<L>  /  AlkPhos  85  07-26    LIVER FUNCTIONS - ( 26 Jul 2021 15:29 )  Alb: 2.3 g/dL / Pro: 4.9 g/dL / ALK PHOS: 85 U/L / ALT: 8 U/L / AST: 20 U/L / GGT: x           PT/INR - ( 25 Jul 2021 08:45 )   PT: 14.1 sec;   INR: 1.18          PTT - ( 25 Jul 2021 08:45 )  PTT:33.1 sec  Urinalysis Basic - ( 25 Jul 2021 08:51 )    Color: x / Appearance: x / SG: x / pH: x  Gluc: x / Ketone: x  / Bili: x / Urobili: x   Blood: x / Protein: x / Nitrite: x   Leuk Esterase: x / RBC: < 5 /HPF / WBC 5-10 /HPF   Sq Epi: x / Non Sq Epi: 0-5 /HPF / Bacteria: Present /HPF        MICROBIOLOGY  [] Pathology slides reviewed and/or discussed with pathologist  [] Microbiology findings discussed with microbiologist or slides reviewed  [] Images erviewed with radiologist  [] Case discussed with an attending physician in addition to the patient's primary physician  [] Records, reports from outside [facility] reviewed    [] Patient requires continued monitoring for:  [] Total time spent by attending physician: __ minutes, excluding procedure time.

## 2021-07-26 NOTE — CONSULT NOTE ADULT - PROBLEM SELECTOR RECOMMENDATION 9
acute on chronic lower back pain 2/2 ?spine mets. HA 2/2 brain mets. During most recent  [facility] admission, pt seen by palliative care for pain management where her Fentanyl 25 mcg/hr q72 patch was increased to 75 mcg/hr iso uncontrolled pain and PRN opioid use. taking oxycodone 10 mg PO q4 PRN prior to admission 2-4 times per day. suspect encephalopathy multifactorial, less likely opioid related as pt is not opioid naive. ms 2 mg IV and oxycodone 5 mg PO prn subtherapeutic, especially without long acting opioid. pt unable to safely swallow   -recommend starting fentanyl 12 mcg/hr q72 patch  -recommend ms 4 mg IV q4 PRN severe pain  -cw home gabapentin 100 mg PO BID  -hold steroids for now iso infection   -consider further imaging MRI to fu spine mets, brain mets  -recommend premedicating MS 4 mg IV x1 prior to MRI  -bisacodyl supp PRN no BM q3D

## 2021-07-26 NOTE — DIETITIAN INITIAL EVALUATION ADULT. - ADD RECOMMEND
1. Recommend SLP follow up to determine safest diet, diet per SLP 2. Monitor GOC 3. Monitor lytes and replete 4. MVI, zinc, vit C for wound healing 5. RD to remain available prn

## 2021-07-26 NOTE — CONSULT NOTE ADULT - ASSESSMENT
75y old Female with PMHx of Melanoma with mets to brain, spine  and multiple organs, HTN, PE (on Enoxaparin), seizure on Keppra secondary to brain mets, S/P craniotomy x2(June 2020, July 2021), shingles (recently treated at [facility]), chronic back pain 2/2 mets who presented to the ED brought in by her daughter with concerns of left side weakness, altered mental status and left side facial droop. Hospital course c/b high fevers tmax 103 tachycardia and hypotension resolved after fluid boluses. Patient empirically started on meropenem. Bcx NGTD, Ucx with E faecium however patient without urinary symptoms. EEG with epileptiform potential in R frontal lobe without epileptiform activity. ID consulted for fevers and r/o VZV encephalitis given patient's recent hx of shingles.    Recommendations:   - please perform a lumbar puncture and send of CSF cell count, protein, glucose. VZV PCR  - increase meropenem to 2g q8h (CNS dosing)  - given recent hx of shingles, can start acyclovir 800mg q8h  - IV hydration while on acyclovir    Discussed with primary team    ID Team 1 75y old Female with PMHx of Melanoma with mets to brain, spine  and multiple organs, HTN, PE (on Enoxaparin), seizure on Keppra secondary to brain mets, S/P craniotomy x2(June 2020, July 2021), shingles (recently treated at [facility]), chronic back pain 2/2 mets who presented to the ED brought in by her daughter with concerns of left side weakness, altered mental status and left side facial droop. Hospital course c/b high fevers tmax 103 tachycardia and hypotension resolved after fluid boluses. Patient empirically started on meropenem. Bcx NGTD, Ucx with E faecium however patient without urinary symptoms. EEG with epileptiform potential in R frontal lobe without epileptiform activity. ID consulted for fevers and r/o VZV encephalitis given patient's recent hx of shingles.    Recommendations:   - please perform a lumbar puncture and send of CSF cell count, protein, glucose, CSF PCR, CSF culture   - increase meropenem to 2g q8h (CNS dosing)  - given recent hx of shingles, can start acyclovir 800mg q8h  - IV hydration while on acyclovir    Discussed with primary team    ID Team 1

## 2021-07-26 NOTE — DIETITIAN INITIAL EVALUATION ADULT. - PROBLEM SELECTOR PLAN 8
F: none  E: replete to K>4, Mg>2, Phos>2.5  N: Full liquids for now, Speech and swallow eval  Ppx: Lovenox    Code Status: Full, Emergency Contact: [phone], Daughter (Victor Hugo)    Dispo: [facility]

## 2021-07-26 NOTE — PROGRESS NOTE ADULT - PROBLEM SELECTOR PROBLEM 8
Nutrition, metabolism, and development symptoms Hypertension NSTEMI (non-ST elevated myocardial infarction)

## 2021-07-26 NOTE — CONSULT NOTE ADULT - ASSESSMENT
75y old Female with PMHx of Melanoma with mets to brain, spine  and multiple organs ([place]. [place] at [facility]) receiving WBRT, HTN, PE, seizure secondary to brain mets, S/P craniotomies (6/2020, 7/2021), shingles, neoplasm related back pain who presented 7/24 to the ED with concerns of left side weakness, altered mental status and left side facial droop.  Palliative care consulted for GOC.

## 2021-07-26 NOTE — PROGRESS NOTE ADULT - PROBLEM SELECTOR PLAN 9
F: none  E: replete to K>4, Mg>2, Phos>2.5  N: Full liquids for now, Speech and swallow eval  Ppx: Lovenox    Code Status: Full, Emergency Contact: [phone], Daughter (Victor Hugo)    Dispo: [facility] Home med: Amlodipine 5mg PO, labetalol 100mg po BID  Blood pressure on the lower end   Plan:   - HOLD amlodipine 5mg PO daily  - HOLD labetalol 100mg po BID Pt with known HTN, home meds include Amlodipine 5mg PO, labetalol 100mg po BID. Currently low BP in setting of sepsis.    - HOLD amlodipine 5mg PO daily  - HOLD labetalol 100mg po BID

## 2021-07-26 NOTE — PROGRESS NOTE ADULT - PROBLEM SELECTOR PLAN 8
F: none  E: replete to K>4, Mg>2, Phos>2.5  N: Full liquids for now, Speech and swallow eval  Ppx: Lovenox    Code Status: Full, Emergency Contact: [phone], Daughter (Victor Hugo)    Dispo: [facility] Home med: Amlodipine 5mg PO, labetalol 100mg po BID  Blood pressure on the lower end   Plan:   - HOLD amlodipine 5mg PO daily  - HOLD labetalol 100mg po BID #Type 2 NSTEMI  - trops downtrending  - management of sepsis, as above

## 2021-07-26 NOTE — DIETITIAN INITIAL EVALUATION ADULT. - PROBLEM SELECTOR PLAN 2
ADDENDUM:  pt w/ metastatic melanoma at multiple sites; poor prognosis, will likely need palliative consult, GOC discussion. obtain prior workup and treatment from [facility]

## 2021-07-26 NOTE — PROGRESS NOTE ADULT - SUBJECTIVE AND OBJECTIVE BOX
Internal Medicine Progress Note  Steffen Fisher, PGY-1  Pager: [phone]    ******INCOMPLETE******    Patient is a 75y old  Female who presents with a chief complaint of AMS (26 Jul 2021 06:37)    OVERNIGHT EVENTS/INTERVAL HPI:    REVIEW OF SYSTEMS:  All other review of systems is negative unless indicated above.    OBJECTIVE:  Daily     Daily     Physical Exam:  General: in no acute distress  Eyes: EOMI intact bilaterally. Anicteric sclerae, moist conjunctivae  HENT: Moist mucous membranes  Neck: Trachea midline, supple  Lungs: CTA B/L. No wheezes, rales, or ronchi  Cardiovascular: RRR. No murmurs, rubs, or gallops  Abdomen: Soft, non-tender non-distended; No rebound or guarding  Extremities: Normal range of motion, No clubbing, cyanosis or edema  MSK: No midline bony tenderness. No CVA tenderness bilaterally  Neurological: Alert and oriented x3  Skin: Warm and dry. No obvious rash     Medications:  MEDICATIONS  (STANDING):  enoxaparin Injectable 40 milliGRAM(s) SubCutaneous every 24 hours  gabapentin 100 milliGRAM(s) Oral three times a day  lactated ringers. 1000 milliLiter(s) (120 mL/Hr) IV Continuous <Continuous>  levETIRAcetam  IVPB 1000 milliGRAM(s) IV Intermittent every 12 hours  melatonin 3 milliGRAM(s) Oral at bedtime  meropenem  IVPB 1000 milliGRAM(s) IV Intermittent every 8 hours  midodrine 10 milliGRAM(s) Oral every 8 hours  mirtazapine 15 milliGRAM(s) Oral at bedtime  senna 1 Tablet(s) Oral at bedtime  valproate sodium IVPB 500 milliGRAM(s) IV Intermittent every 12 hours    MEDICATIONS  (PRN):  acetaminophen   Tablet .. 650 milliGRAM(s) Oral every 6 hours PRN Temp greater or equal to 38.5C (101.3F), Mild Pain (1 - 3)  HYDROmorphone  Injectable 1 milliGRAM(s) IV Push every 4 hours PRN Severe Pain (7 - 10)  oxyCODONE    IR 5 milliGRAM(s) Oral every 4 hours PRN Severe Pain (7 - 10)      Labs:                        11.0   16.48 )-----------( 182      ( 25 Jul 2021 08:45 )             34.2     07-25    138  |  108  |  23  ----------------------------<  124<H>  3.6   |  15<L>  |  1.28    Ca    9.9      25 Jul 2021 08:45  Phos  3.0     07-24  Mg     2.2     07-24    TPro  5.6<L>  /  Alb  3.0<L>  /  TBili  0.4  /  DBili  x   /  AST  21  /  ALT  8<L>  /  AlkPhos  58  07-25    PT/INR - ( 25 Jul 2021 08:45 )   PT: 14.1 sec;   INR: 1.18          PTT - ( 25 Jul 2021 08:45 )  PTT:33.1 sec  Urinalysis Basic - ( 25 Jul 2021 08:51 )    Color: x / Appearance: x / SG: x / pH: x  Gluc: x / Ketone: x  / Bili: x / Urobili: x   Blood: x / Protein: x / Nitrite: x   Leuk Esterase: x / RBC: < 5 /HPF / WBC 5-10 /HPF   Sq Epi: x / Non Sq Epi: 0-5 /HPF / Bacteria: Present /HPF      COVID-19 PCR: Negative (24 Jul 2021 18:11)      Radiology: Reviewed Internal Medicine Progress Note  Steffen Fisher, PGY-1  Pager: [phone]    ******INCOMPLETE******    Patient is a 75y old  Female who presents with a chief complaint of AMS (26 Jul 2021 06:37)    OVERNIGHT EVENTS/INTERVAL HPI: As per night team, patient refused her oral medications. Patient seen and examined at bedside. Offers no complaints at this time. Patient denies fevers, sweats, chills, SOB, dyspnea, chest pain, abdominal pain.    REVIEW OF SYSTEMS:  All other review of systems is negative unless indicated above.    OBJECTIVE:  T(C): 38.3 (07-26-21 @ 06:21), Max: 39.3 (07-25-21 @ 08:00)  HR: 103 (07-26-21 @ 06:21) (98 - 130)  BP: 115/83 (07-26-21 @ 06:21) (78/62 - 118/72)  RR: 17 (07-26-21 @ 06:21) (17 - 20)  SpO2: 94% (07-26-21 @ 06:21) (92% - 99%)    I&O's Summary    25 Jul 2021 07:01  -  26 Jul 2021 07:00  --------------------------------------------------------  IN: 2000 mL / OUT: 700 mL / NET: 1300 mL    Physical Exam:  General: in no acute distress  Eyes: EOMI intact bilaterally. Anicteric sclerae, moist conjunctivae  HENT: Moist mucous membranes  Neck: Trachea midline, supple  Lungs: CTA B/L. No wheezes, rales, or ronchi  Cardiovascular: RRR. No murmurs, rubs, or gallops  Abdomen: Soft, non-tender non-distended; No rebound or guarding  Extremities: Normal range of motion, No clubbing, cyanosis or edema  MSK: No midline bony tenderness. No CVA tenderness bilaterally  Neurological: Alert and oriented x3  Skin: Warm and dry. No obvious rash     Medications:  MEDICATIONS  (STANDING):  enoxaparin Injectable 40 milliGRAM(s) SubCutaneous every 24 hours  gabapentin 100 milliGRAM(s) Oral three times a day  lactated ringers. 1000 milliLiter(s) (120 mL/Hr) IV Continuous <Continuous>  levETIRAcetam  IVPB 1000 milliGRAM(s) IV Intermittent every 12 hours  melatonin 3 milliGRAM(s) Oral at bedtime  meropenem  IVPB 1000 milliGRAM(s) IV Intermittent every 8 hours  midodrine 10 milliGRAM(s) Oral every 8 hours  mirtazapine 15 milliGRAM(s) Oral at bedtime  senna 1 Tablet(s) Oral at bedtime  valproate sodium IVPB 500 milliGRAM(s) IV Intermittent every 12 hours    MEDICATIONS  (PRN):  acetaminophen   Tablet .. 650 milliGRAM(s) Oral every 6 hours PRN Temp greater or equal to 38.5C (101.3F), Mild Pain (1 - 3)  HYDROmorphone  Injectable 1 milliGRAM(s) IV Push every 4 hours PRN Severe Pain (7 - 10)  oxyCODONE    IR 5 milliGRAM(s) Oral every 4 hours PRN Severe Pain (7 - 10)    Labs:                      11.0   16.48 )-----------( 182      ( 25 Jul 2021 08:45 )             34.2     07-25    138  |  108  |  23  ----------------------------<  124<H>  3.6   |  15<L>  |  1.28    Ca    9.9      25 Jul 2021 08:45  Phos  3.0     07-24  Mg     2.2     07-24    TPro  5.6<L>  /  Alb  3.0<L>  /  TBili  0.4  /  DBili  x   /  AST  21  /  ALT  8<L>  /  AlkPhos  58  07-25    PT/INR - ( 25 Jul 2021 08:45 )   PT: 14.1 sec;   INR: 1.18     PTT - ( 25 Jul 2021 08:45 )  PTT:33.1 sec    Urinalysis Basic - ( 25 Jul 2021 08:51 )  Color: x / Appearance: x / SG: x / pH: x  Gluc: x / Ketone: x  / Bili: x / Urobili: x   Blood: x / Protein: x / Nitrite: x   Leuk Esterase: x / RBC: < 5 /HPF / WBC 5-10 /HPF   Sq Epi: x / Non Sq Epi: 0-5 /HPF / Bacteria: Present /HPF    COVID-19 PCR: Negative (24 Jul 2021 18:11)    Radiology: Reviewed Internal Medicine Progress Note  Steffen Fisher, PGY-1  Pager: [phone]    ******INCOMPLETE******    Patient is a 75y old  Female who presents with a chief complaint of AMS (26 Jul 2021 06:37)    OVERNIGHT EVENTS/INTERVAL HPI: As per night team, patient refused her oral medications. Patient seen and examined at bedside. Offers no complaints at this time. Patient denies fevers, sweats, chills, SOB, dyspnea, chest pain, abdominal pain.    REVIEW OF SYSTEMS:  All other review of systems is negative unless indicated above.    OBJECTIVE:  T(C): 38.3 (07-26-21 @ 06:21), Max: 39.3 (07-25-21 @ 08:00)  HR: 103 (07-26-21 @ 06:21) (98 - 130)  BP: 115/83 (07-26-21 @ 06:21) (78/62 - 118/72)  RR: 17 (07-26-21 @ 06:21) (17 - 20)  SpO2: 94% (07-26-21 @ 06:21) (92% - 99%)    I&O's Summary    25 Jul 2021 07:01  -  26 Jul 2021 07:00  --------------------------------------------------------  IN: 2000 mL / OUT: 700 mL / NET: 1300 mL    Physical Exam:  General: in no acute distress  Eyes: EOMI intact bilaterally. Anicteric sclerae, moist conjunctivae  HENT: Moist mucous membranes  Neck: Trachea midline, supple  Lungs: CTA B/L. No wheezes, rales, or ronchi  Cardiovascular: RRR. No murmurs, rubs, or gallops  Abdomen: Soft, non-tender non-distended; No rebound or guarding  Extremities: Normal range of motion, No clubbing, cyanosis or edema  MSK: No midline bony tenderness. No CVA tenderness bilaterally  Neurological: Alert and oriented x1  Skin: Warm and dry. No obvious rash     Medications:  MEDICATIONS  (STANDING):  enoxaparin Injectable 40 milliGRAM(s) SubCutaneous every 24 hours  gabapentin 100 milliGRAM(s) Oral three times a day  lactated ringers. 1000 milliLiter(s) (120 mL/Hr) IV Continuous <Continuous>  levETIRAcetam  IVPB 1000 milliGRAM(s) IV Intermittent every 12 hours  melatonin 3 milliGRAM(s) Oral at bedtime  meropenem  IVPB 1000 milliGRAM(s) IV Intermittent every 8 hours  midodrine 10 milliGRAM(s) Oral every 8 hours  mirtazapine 15 milliGRAM(s) Oral at bedtime  senna 1 Tablet(s) Oral at bedtime  valproate sodium IVPB 500 milliGRAM(s) IV Intermittent every 12 hours    MEDICATIONS  (PRN):  acetaminophen   Tablet .. 650 milliGRAM(s) Oral every 6 hours PRN Temp greater or equal to 38.5C (101.3F), Mild Pain (1 - 3)  HYDROmorphone  Injectable 1 milliGRAM(s) IV Push every 4 hours PRN Severe Pain (7 - 10)  oxyCODONE    IR 5 milliGRAM(s) Oral every 4 hours PRN Severe Pain (7 - 10)    Labs:                      11.0   16.48 )-----------( 182      ( 25 Jul 2021 08:45 )             34.2     07-25    138  |  108  |  23  ----------------------------<  124<H>  3.6   |  15<L>  |  1.28    Ca    9.9      25 Jul 2021 08:45  Phos  3.0     07-24  Mg     2.2     07-24    TPro  5.6<L>  /  Alb  3.0<L>  /  TBili  0.4  /  DBili  x   /  AST  21  /  ALT  8<L>  /  AlkPhos  58  07-25    PT/INR - ( 25 Jul 2021 08:45 )   PT: 14.1 sec;   INR: 1.18     PTT - ( 25 Jul 2021 08:45 )  PTT:33.1 sec    Urinalysis Basic - ( 25 Jul 2021 08:51 )  Color: x / Appearance: x / SG: x / pH: x  Gluc: x / Ketone: x  / Bili: x / Urobili: x   Blood: x / Protein: x / Nitrite: x   Leuk Esterase: x / RBC: < 5 /HPF / WBC 5-10 /HPF   Sq Epi: x / Non Sq Epi: 0-5 /HPF / Bacteria: Present /HPF    COVID-19 PCR: Negative (24 Jul 2021 18:11)    Radiology: Reviewed Internal Medicine Progress Note  Steffen Fisher, PGY-1  Pager: [phone]    Patient is a 75y old  Female who presents with a chief complaint of AMS (26 Jul 2021 06:37)    OVERNIGHT EVENTS/INTERVAL HPI: As per night team, patient refused her oral medications overnight. Patient seen and examined at bedside. Offers no complaints at this time. Pt denies fevers/chills, SOB, dyspnea, chest pain, abdominal pain.    REVIEW OF SYSTEMS:  All other review of systems is negative unless indicated above.    OBJECTIVE:  T(C): 38.3 (07-26-21 @ 06:21), Max: 39.3 (07-25-21 @ 08:00)  HR: 103 (07-26-21 @ 06:21) (98 - 130)  BP: 115/83 (07-26-21 @ 06:21) (78/62 - 118/72)  RR: 17 (07-26-21 @ 06:21) (17 - 20)  SpO2: 94% (07-26-21 @ 06:21) (92% - 99%)    I&O's Summary    25 Jul 2021 07:01  -  26 Jul 2021 07:00  --------------------------------------------------------  IN: 2000 mL / OUT: 700 mL / NET: 1300 mL    Physical Exam:  General: in no acute distress  Eyes: EOMI intact bilaterally. Anicteric sclerae, moist conjunctivae  HENT: Moist mucous membranes  Neck: Trachea midline, supple  Lungs: CTA B/L. No wheezes, rales, or ronchi  Cardiovascular: RRR. No murmurs, rubs, or gallops  Abdomen: Soft, non-tender non-distended; No rebound or guarding  Extremities: Normal range of motion, No clubbing, cyanosis or edema  MSK: No midline bony tenderness. No CVA tenderness bilaterally  Neurological: Alert and oriented x1  Skin: Warm and dry. No obvious rash     Medications:  MEDICATIONS  (STANDING):  enoxaparin Injectable 40 milliGRAM(s) SubCutaneous every 24 hours  gabapentin 100 milliGRAM(s) Oral three times a day  lactated ringers. 1000 milliLiter(s) (120 mL/Hr) IV Continuous <Continuous>  levETIRAcetam  IVPB 1000 milliGRAM(s) IV Intermittent every 12 hours  melatonin 3 milliGRAM(s) Oral at bedtime  meropenem  IVPB 1000 milliGRAM(s) IV Intermittent every 8 hours  midodrine 10 milliGRAM(s) Oral every 8 hours  mirtazapine 15 milliGRAM(s) Oral at bedtime  senna 1 Tablet(s) Oral at bedtime  valproate sodium IVPB 500 milliGRAM(s) IV Intermittent every 12 hours    MEDICATIONS  (PRN):  acetaminophen   Tablet .. 650 milliGRAM(s) Oral every 6 hours PRN Temp greater or equal to 38.5C (101.3F), Mild Pain (1 - 3)  HYDROmorphone  Injectable 1 milliGRAM(s) IV Push every 4 hours PRN Severe Pain (7 - 10)  oxyCODONE    IR 5 milliGRAM(s) Oral every 4 hours PRN Severe Pain (7 - 10)    Labs:                      11.0   16.48 )-----------( 182      ( 25 Jul 2021 08:45 )             34.2     07-25    138  |  108  |  23  ----------------------------<  124<H>  3.6   |  15<L>  |  1.28    Ca    9.9      25 Jul 2021 08:45  Phos  3.0     07-24  Mg     2.2     07-24    TPro  5.6<L>  /  Alb  3.0<L>  /  TBili  0.4  /  DBili  x   /  AST  21  /  ALT  8<L>  /  AlkPhos  58  07-25    PT/INR - ( 25 Jul 2021 08:45 )   PT: 14.1 sec;   INR: 1.18     PTT - ( 25 Jul 2021 08:45 )  PTT:33.1 sec    Urinalysis Basic - ( 25 Jul 2021 08:51 )  Color: x / Appearance: x / SG: x / pH: x  Gluc: x / Ketone: x  / Bili: x / Urobili: x   Blood: x / Protein: x / Nitrite: x   Leuk Esterase: x / RBC: < 5 /HPF / WBC 5-10 /HPF   Sq Epi: x / Non Sq Epi: 0-5 /HPF / Bacteria: Present /HPF    COVID-19 PCR: Negative (24 Jul 2021 18:11)    Radiology: Reviewed Internal Medicine Progress Note  Steffen Fisher, PGY-1  Pager: [phone]    Patient is a 75y old Female who presents with a chief complaint of AMS (26 Jul 2021 06:37)    OVERNIGHT EVENTS/INTERVAL HPI: As per night team, patient refused her oral medications overnight. Patient seen and examined at bedside. Offers no complaints at this time. Pt denies fevers/chills, SOB, dyspnea, chest pain, abdominal pain.    REVIEW OF SYSTEMS:  All other review of systems is negative unless indicated above.    OBJECTIVE:  T(C): 38.3 (07-26-21 @ 06:21), Max: 39.3 (07-25-21 @ 08:00)  HR: 103 (07-26-21 @ 06:21) (98 - 130)  BP: 115/83 (07-26-21 @ 06:21) (78/62 - 118/72)  RR: 17 (07-26-21 @ 06:21) (17 - 20)  SpO2: 94% (07-26-21 @ 06:21) (92% - 99%)    I&O's Summary    25 Jul 2021 07:01  -  26 Jul 2021 07:00  --------------------------------------------------------  IN: 2000 mL / OUT: 700 mL / NET: 1300 mL    Physical Exam:  General: in no acute distress  Eyes: EOMI intact bilaterally. Anicteric sclerae, moist conjunctivae  HENT: Moist mucous membranes  Neck: Trachea midline, supple  Lungs: CTA B/L. No wheezes, rales, or ronchi  Cardiovascular: RRR. No murmurs, rubs, or gallops  Abdomen: Soft, non-tender non-distended; No rebound or guarding  Extremities: Normal range of motion, No clubbing, cyanosis or edema  MSK: No midline bony tenderness. No CVA tenderness bilaterally  Neurological: Alert and oriented x1  Skin: Warm and dry. No obvious rash     Medications:  MEDICATIONS  (STANDING):  enoxaparin Injectable 40 milliGRAM(s) SubCutaneous every 24 hours  gabapentin 100 milliGRAM(s) Oral three times a day  lactated ringers. 1000 milliLiter(s) (120 mL/Hr) IV Continuous <Continuous>  levETIRAcetam  IVPB 1000 milliGRAM(s) IV Intermittent every 12 hours  melatonin 3 milliGRAM(s) Oral at bedtime  meropenem  IVPB 1000 milliGRAM(s) IV Intermittent every 8 hours  midodrine 10 milliGRAM(s) Oral every 8 hours  mirtazapine 15 milliGRAM(s) Oral at bedtime  senna 1 Tablet(s) Oral at bedtime  valproate sodium IVPB 500 milliGRAM(s) IV Intermittent every 12 hours    MEDICATIONS  (PRN):  acetaminophen   Tablet .. 650 milliGRAM(s) Oral every 6 hours PRN Temp greater or equal to 38.5C (101.3F), Mild Pain (1 - 3)  HYDROmorphone  Injectable 1 milliGRAM(s) IV Push every 4 hours PRN Severe Pain (7 - 10)  oxyCODONE    IR 5 milliGRAM(s) Oral every 4 hours PRN Severe Pain (7 - 10)    Labs:                      11.0   16.48 )-----------( 182      ( 25 Jul 2021 08:45 )             34.2     07-25    138  |  108  |  23  ----------------------------<  124<H>  3.6   |  15<L>  |  1.28    Ca    9.9      25 Jul 2021 08:45  Phos  3.0     07-24  Mg     2.2     07-24    TPro  5.6<L>  /  Alb  3.0<L>  /  TBili  0.4  /  DBili  x   /  AST  21  /  ALT  8<L>  /  AlkPhos  58  07-25    PT/INR - ( 25 Jul 2021 08:45 )   PT: 14.1 sec;   INR: 1.18     PTT - ( 25 Jul 2021 08:45 )  PTT:33.1 sec    Urinalysis Basic - ( 25 Jul 2021 08:51 )  Color: x / Appearance: x / SG: x / pH: x  Gluc: x / Ketone: x  / Bili: x / Urobili: x   Blood: x / Protein: x / Nitrite: x   Leuk Esterase: x / RBC: < 5 /HPF / WBC 5-10 /HPF   Sq Epi: x / Non Sq Epi: 0-5 /HPF / Bacteria: Present /HPF    COVID-19 PCR: Negative (24 Jul 2021 18:11)    Radiology: Reviewed

## 2021-07-26 NOTE — PROGRESS NOTE ADULT - SUBJECTIVE AND OBJECTIVE BOX
EPILEPSY PROGRESS NOTE:  Subjective:  Patient seen and examined at bedside, and lethargic. As per daughter patient was hospitalized for 2months at [facility] following craniotomy (6/1/21) under neurosurgeon Dr Ferreira's care. She was also treated for shingles above the L eyelid or V1 distribution for 9 days, and was transferred to rehab on Friday 7/23. As per daughter, patient was communicative and following commands, but had some short term memory issues. Late evening on 7/23, she noticed an acute change with her mother, and she was no longer verbal, and lethargic. Of note, she had 2 seizures since her diagnosis of metastatic melanoma in 2019 that occurred in March, and April of 2021 for which keppra was started. The altered level of consciousness did not resemble the seizure events. The seizure events were described as speech and behavioral arrest followed by confusion, there was no convulsion. Denies tongue bite, urinary or fecal incontinence.     REVIEW OF SYSTEMS:  Unable to obtain 2/2 AMS    MEDICATIONS  (STANDING):  enoxaparin Injectable 40 milliGRAM(s) SubCutaneous every 24 hours  gabapentin 100 milliGRAM(s) Oral three times a day  lactated ringers. 1000 milliLiter(s) (120 mL/Hr) IV Continuous <Continuous>  levETIRAcetam  IVPB 1000 milliGRAM(s) IV Intermittent every 12 hours  melatonin 3 milliGRAM(s) Oral at bedtime  meropenem  IVPB 1000 milliGRAM(s) IV Intermittent every 8 hours  midodrine 10 milliGRAM(s) Oral every 8 hours  mirtazapine 15 milliGRAM(s) Oral at bedtime  senna 1 Tablet(s) Oral at bedtime  valproate sodium IVPB 500 milliGRAM(s) IV Intermittent every 12 hours    MEDICATIONS  (PRN):  acetaminophen   Tablet .. 650 milliGRAM(s) Oral every 6 hours PRN Temp greater or equal to 38.5C (101.3F), Mild Pain (1 - 3)    VITAL SIGNS:  T(C): 36.9 (07-26-21 @ 14:05), Max: 39.4 (07-26-21 @ 11:46)  HR: 104 (07-26-21 @ 14:05) (103 - 130)  BP: 103/65 (07-26-21 @ 14:05) (83/55 - 115/83)  RR: 16 (07-26-21 @ 14:05) (16 - 20)  SpO2: 96% (07-26-21 @ 14:05) (92% - 98%)  Wt(kg): --    PHYSICAL EXAM:  Lethargic, oriented to name only. Respond later to other questions asked. No obvious dysarthria or aphasia. Follows some simple commands such as lifting arms  PERRLA 3mm brisk, EOMI, no nystagmus. No facial weakness appreciated  B/L LE 3+ pitting edema w RLE erythema and warm to touch  Upper extremities moves to command, at least 3/5  Bilateral LE moves minimally w/ gross toe wiggle  1+ B/L UE, absent in LE and toes mute    LABS:  CBC Full  -  ( 26 Jul 2021 07:18 )  WBC Count : 13.50 K/uL  RBC Count : 3.35 M/uL  Hemoglobin : 9.6 g/dL  Hematocrit : 32.6 %  Platelet Count - Automated : 145 K/uL  Mean Cell Volume : 97.3 fl  Mean Cell Hemoglobin : 28.7 pg  Mean Cell Hemoglobin Concentration : 29.4 gm/dL  Auto Neutrophil # : 11.60 K/uL  Auto Lymphocyte # : 0.85 K/uL  Auto Monocyte # : 0.62 K/uL  Auto Eosinophil # : 0.17 K/uL  Auto Basophil # : 0.06 K/uL  Auto Neutrophil % : 85.9 %  Auto Lymphocyte % : 6.3 %  Auto Monocyte % : 4.6 %  Auto Eosinophil % : 1.3 %  Auto Basophil % : 0.4 %    07-26    138  |  110<H>  |  23  ----------------------------<  94  3.6   |  16<L>  |  1.20    Ca    9.6      26 Jul 2021 07:18  Phos  3.9     07-26  Mg     2.1     07-26    TPro  5.2<L>  /  Alb  2.2<L>  /  TBili  0.3  /  DBili  x   /  AST  21  /  ALT  7<L>  /  AlkPhos  62  07-26    LIVER FUNCTIONS - ( 26 Jul 2021 07:18 )  Alb: 2.2 g/dL / Pro: 5.2 g/dL / ALK PHOS: 62 U/L / ALT: 7 U/L / AST: 21 U/L / GGT: x           PT/INR - ( 25 Jul 2021 08:45 )   PT: 14.1 sec;   INR: 1.18     PTT - ( 25 Jul 2021 08:45 )  PTT:33.1 sec

## 2021-07-26 NOTE — EEG REPORT - NS EEG TEXT BOX
[facility] Department of Neurology  Inpatient Continuous video-Electroencephalogram    Patient Name:	RYNE NATION    :	1945  MRN:	[id number]    Study Start Date/Time:  2021, 11:43:02 AM  Study End Date/Time: ongoing    Referred by: Dr. Kerwin Shah    Brief Clinical History:  RYNE NATION is a 75 year old Female admitted from nursing home, due to left-sided weakness.  Known prior craniotomies (right) for metastatic melanoma (brain and spine).  Concern for seizure vs stroke, with initial stroke work-up negative; study performed to investigate for seizures or markers of epilepsy.  Technologist notes:-    Diagnosis Code:   R56.9 convulsions/seizure  CPT: 64425 EEG with video 12-26h  Technical CPT: 96017 set-up +  67506 vEEG unmonitored 12-26h    Pertinent Medications:  Levetiracetam 1000mg bid; Depakote 500mg bid    Acquisition Details:  Electroencephalography was acquired using a minimum of 21 channels on an [employer] system v 8.5.1 with electrode placement according to the standard International 10-20 system following ACNS (American Clinical Neurophysiology Society) guidelines for Long-Term Video EEG monitoring.  Anterior temporal T1 and T2 electrodes were utilized whenever possible.   The [employer] automated spike & seizure detections were all reviewed in detail, in addition to extensive portions of raw EEG.    Day 1: 2021 @ 11:43:02 AM to next morning @ 07:00 am  Background:  continuous, with predominantly theta>delta frequencies.  Symmetry:  No persistent asymmetries of voltage or frequency.  Posterior Dominant Rhythm:  No clear PDR  Organization: Absent.  Voltage:  Normal (20+ uV)  Variability: Yes. 		Reactivity: Yes.  N2 sleep: Absent.  Spontaneous Activity:  No epileptiform discharges.  Periodic/rhythmic activity:  Yes (described below):   1Hz periodic discharges, sharply contoured, but not epileptiform in nature.  Events:  No electrographic seizures or significant clinical events.  Provocations:  Hyperventilation and Photic stimulation: was not performed.  EKG: -140.    Daily Summary:    Continuous Moderate generalized slowing, with focal right frontal intermittent sharp delta, often in 1Hz periodic discharges, though not epileptiform individually.  Findings suggest epileptic potential from the right frontal region, though no seizures were identified, along with diffuse cerebral dysfunction.      Clay Elder MD  Attending Neurologist, [facility]

## 2021-07-26 NOTE — CONSULT NOTE ADULT - PROBLEM SELECTOR RECOMMENDATION 4
2/2 known brain mets, cb recent WBRT, shingles related encephalitis, CT Head c/f mild LMD enhancement   -goc: continue comprehensive workup and tx until daughter and primary team obtain collateral from [facility] oncologist   -Risk for delirium while admitted, recommend frequent reorientation  -appreciate neuro recs

## 2021-07-26 NOTE — CONSULT NOTE ADULT - PROBLEM SELECTOR RECOMMENDATION 5
mets to brain, ?spine, lung, soft tissue of neck, ?new LMD   -[place]. Salas [place] [facility] [place]  - sp XRT to ?spine, receiving WBRT, S/P craniotomies (6/2020, 7/2021).   -obtain collateral from [facility] [place], [facility]  -MRI further imaging  -ECOG 4  -pt with poor performance status, septic, progressive symptom burden and unlikely candidate for further treatment, prognosis weeks to months

## 2021-07-26 NOTE — CONSULT NOTE ADULT - PROBLEM SELECTOR RECOMMENDATION 6
-pt unable to participate in GOC  -two adult daughters HCP, caregivers  -per Karen, pt "wanted to fight and do everything until there was nothing left"  -per daughter, plan was for further palliative XRT to brain  -understands pt has incurable disease  -difficulty coping with pt's acute decline and engaging in GOC

## 2021-07-26 NOTE — PROGRESS NOTE ADULT - ASSESSMENT
Patient Isabella Horowitz is a 75y old Female with PMHx of Melanoma with mets to brain, spine  and multiple organs, HTN, PE (on Enoxaparin), seizure on Keppra secondary to brain mets, S/P craniotomy x2(June 2020, July 2021), shingles (recently treated at [facility]), chronic back pain 2/2 mets who presented to the ED brought in by her daughter with concerns of left side weakness, altered mental status and left side facial droop. Given patients history of seizure recently possibly due to mets the presentation of the patient is possibly due to seizures, as stroke workup was done and is less likely the cause, however will need to follow up with MRI. Also her presentation could be due to polypharmacy as patient is on multiple medications for pain and anxiolytics. Patient is being admitted for monitoring and further management.     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  Patient is being admitted for monitoring and further management.

## 2021-07-26 NOTE — CHART NOTE - NSCHARTNOTEFT_GEN_A_CORE
Pt daughter refused lower extremity doppler to rule out DVT as says she had recent doppler which was neg for DVT

## 2021-07-26 NOTE — PROGRESS NOTE ADULT - PROBLEM SELECTOR PLAN 6
and Depression  Home med: Mirtazepine 15mg PO qhs   Plan:   - c/w Mirtazepine 15mg PO qhs Home Med: Ramelteon 8mg QHS  Plan:   - c/w Ramelteon 8mg po qhs Recent history of PE. Pt takes weight based home Lovenox 40mg SQ daily    - c/w Lovenox 40mg SQ daily

## 2021-07-26 NOTE — DIETITIAN INITIAL EVALUATION ADULT. - PROBLEM SELECTOR PLAN 1
Plan: associated with left side weakness, altered mental status and left side facial droop on admission in the setting of  metastasis to brain 2/2 melanoma; would r/o acute CVA; less likely metabolic encephalopathy  VSS  Mild Leucocytosis around 11  CT Chest w/ IV Cont : There is pulmonary metastatic disease and there are metastases involving left supraclavicular and bilateral axillary lymph nodes, in addition to soft tissue metastases in the shoulder musculature bilaterally.Low-density lesion right lobe of liver adjacent to infiltration of subcutaneous fat in the right lateral chest wall. Multinodular thyroid.  CT Head:  No acute intracranial hemorrhage or transcortical infarction. Status post right frontal craniotomy with subjacent right frontal lobe encephalomalacic changes.  Plan:   - q4hr general neurology checks and vitals.  - f/u MRI Brain with contrast to evaluate for brain mets.  - Hold on home Keppra dose  - Loading dose of valproic acid 1500mg ordered  - c/w Valproic acid 500mg Q12   - f/u vEEG  - discussed with On call epileptologist  - Neuro consulted, f/u recs  - STAT CTH if change in mentation     ADDENDUM: obtain UA

## 2021-07-26 NOTE — PROGRESS NOTE ADULT - PROBLEM SELECTOR PLAN 3
Recent history of PE  Home med: Lovenox 40mg SQ daily  Plan:   - c/w Lovenox 40mg SQ daily    ADDENDUM: weight based lovenox dosing in setting of PE hx ADDENDUM:  pt w/ metastatic melanoma at multiple sites; poor prognosis, will likely need palliative consult, GOC discussion. obtain prior workup and treatment from [facility] Pt w/ metastatic melanoma with metastases at brain, spine, multiple sites. Poor prognosis, will likely need palliative consult, GOC discussion. Obtain prior workup and treatment from [facility]  - Dr. Salas to call daughters/family to discuss pt's decline  - palliative consulted, appreciate recs

## 2021-07-26 NOTE — PROGRESS NOTE ADULT - ASSESSMENT
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 Stroke work-up was negative for acute weakness, and there were no record seizures. Nevertheless, given patient's personal hx of seizures, and risks will continue diagnostic tests, and continue anti-epileptic drug.     Also, there are concerns for progressive AMS that could be potentially be multi-factorial due to infectious process of unknown source (febrile today to 104F) and/or VCV encephalitis  and/or worsening disease process (metastatic lesion).        Recommendations:  - Continue vEEG monitoring  - Continue Keppra 1000mg Q12hrs  - Continue Depakote 500mg Q12hrs (started at [facility])   - Please obtain MRI Brain with contrast to evaluate for brain mets.  - Consider LP after MRI (must be done after MRI to prevent false readings)  - Please consult ID regarding VCV diagnostics and management  - Consider sending VCV PCR  - Maintain seizure and fall precautions   75 year-old female with metastatic melanoma to brain (multiple organs) w/ subsequent seizures (on Keppra) S/P craniotomy x2 (July 2020, June 1st 2021), HTN, and PE on Enoxaparin who was admitted on 7/24/21 for acute AMS, generalized weakness, and non-communicative. Stroke work-up was negative for acute weakness, and there were no record seizures. Nevertheless, given patient's personal hx of seizures, and risks will continue diagnostic tests, and continue anti-epileptic drug.     Also, there are concerns for progressive AMS that could be potentially be multi-factorial due to infectious process of unknown source (febrile today to 104F) and/or VCV encephalitis  and/or worsening disease process (metastatic lesion).        Recommendations:  - Continue vEEG monitoring  - Continue Keppra 1000mg Q12hrs  - Continue Depakote 500mg Q12hrs (started at [facility])   - Please obtain MRI Brain with contrast to evaluate progress of brain mets.  - Consider LP after MRI (best following MRI to prevent confusion of potential dural enhancement)  - Please consult ID regarding VCV diagnostics and management  - Consider sending VCV PCR  - Maintain seizure and fall precautions

## 2021-07-26 NOTE — PROGRESS NOTE ADULT - PROBLEM SELECTOR PLAN 7
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

## 2021-07-26 NOTE — PROGRESS NOTE ADULT - ATTENDING COMMENTS
Patient was seen and examined with the resident team today.  I agree with Dr. Fisher's assessment and plan with the following exceptions/additions:     Briefly, this is sa 76yo woman with a PMH of stage 4 melanoma c/b brain and spine mets w/seizures ([facility] - Dr. Gonsales) s/p craniotomy, PE (on Lovenox), HTN and recent [facility]admission for herpes ophthalmic[facility] who presented from [facility] with AMS and L-sided neuro deficits, found to have metabolic encephalopathy        Deena Roldan  [phone] Patient was seen and examined with the resident team today.  I agree with Dr. Fisher's assessment and plan with the following exceptions/additions:     Briefly, this is sa 76yo woman with a PMH of stage 4 melanoma c/b brain and spine mets w/seizures ([facility] - Dr. Gonsales) s/p craniotomy, PE (on Lovenox), HTN and recent [facility]admission for [facility] ophthalmic[facility] who presented from [facility] with AMS and L-sided neuro deficits, found to have metabolic encephalopathy 2/2 sepsis of unclear source, along with a type 2 NSTEMI.  Of note, stroke work-up was negative.      #Sepsis - source remains unclear; MRSA swab negative; c/w Malena for now; ID consult to assist (would risk stratify possibility of encephalitis given recent aforementioned infection, ?LP); need RVP; receiving IVF bolus this AM for hypotension; low threshold to call ICU triage as still hypotensive and febrile   #Metastatic Melanoma w/Brain, Spine and Additional Mets - collateral from [facility], Dr. Mora to call family about her decline   #Cancer-related pain - on outpatient Fentanyl and Oxycodone per iSTOP; hypotension currently limiting ability to restart home regimen but will at a lower dose once her BP allows for it, monitor for withdrawal symptoms in the meantime   #Hx of Seizures - on vEEG; c/w Keppra and VPA   #HTN - on BB and Amlodipine per med rec but also Midodrine; hold all BP meds indefinitely   #Hx of PE - c/w therapeutic Lovenox BID   #Recent Herpes Ophthalmicus - c/w gabapentin; ID work-up as per above   #Type 2 NSTEMI - trops downtrending; mgmt of sepsis as per above  #?ELISABETH v CKD - baseline unclear; will continue to monitor to better assess   #DVT PPx - Lovenox as per above  #Dispo - TBD     Deena Roldan  [phone]

## 2021-07-26 NOTE — CONSULT NOTE ADULT - ATTENDING COMMENTS
Agree with above. Unclear cause of fevers.  Given AMS, LP seems reasonable. See work up as above.
Melanoma with metastasis. Shock resolved with fluid resuscitation. No indication for ICU at this time. Rule out adrenal insufficiency. Rest as above

## 2021-07-26 NOTE — CONSULT NOTE ADULT - SUBJECTIVE AND OBJECTIVE BOX
HPI:   75y old Female with PMHx of Melanoma with mets to brain, spine  and multiple organs (Dr. Mora at [facility]) receiving WBRT, HTN, PE, seizure secondary to brain mets, S/P craniotomies (6/2020, 7/2021), shingles, neoplasm related back pain who presented 7/24 to the ED with concerns of left side weakness, altered mental status and left side facial droop.     Per chart review, pt recently admitted to [facility], was treated for shingles, and was discharged to [facility], where she was discharged from several days prior to presenting to [facility]. Pt seen by palliative care for pain management where her Fentanyl 25 mcg/hr q72 patch was increased to 75 mcg/hr iso uncontrolled pain and PRN opioid use.  Pt also admitted to [facility] 4/2021, found to have seizures: no jerking movement, would stare the wall for few seconds, with no post-ictal confusion, tongue biting, loss of consciousness, bowel or bladder incontinence. Pt started on Keppra.    At [facility] EED, pt's daughter reported that at baseline, pt is generally able to talk and understand things going around her. Stroke called called. On arrival patient with R hemineglect with head turned toward left, NIHSS 16, she had altered level of consciousness facial palsy, left arm and B/L LE weakness, mild-mod loss of sensation and R hemineglect.     Palliative care consulted for goc in setting of progressive metastatic MM.     ED Course  Vitals: T:98.3 F  HR: 89- 105/min    BP: 125-140/90-72    RR: 20/min    SPO2: 97% on RA  Labs s/f: Sodium, Serum: 139, Potassium: 4.2, GFR if : 59, WBC Count: 11.44    Imaging: CT Chest w/ IV Cont : There is pulmonary metastatic disease and there are metastases involving left supraclavicular and bilateral axillary lymph nodes, in addition to soft tissue metastases in the shoulder musculature bilaterally.Low-density lesion right lobe of liver adjacent to infiltration of subcutaneous fat in the right lateral chest wall.Multinodular thyroid.Small right pleural effusion.  CT Head:  No acute intracranial hemorrhage or transcortical infarction. Status post right frontal craniotomy with subjacent right frontal lobe encephalomalacic changes.  CT Angio Neck w/ IV Cont: Multiple pulmonary metastases, Multiple soft tissue metastases, No acute vascular abnormality  EKG: NSR  ED Interventions: Oxycodone 10mg PO for back pain     PAST MEDICAL & SURGICAL HISTORY:  Hypertension  Pulmonary embolism  Melanoma  Shingles  Chronic back pain  Seizures  H/O craniotomy    FAMILY HISTORY:  No pertinent family history in first degree relatives     Reviewed and found non contributory in mother or father    SOCIAL HISTORY:     Allergies    crawfish (Anaphylaxis)  Keflex (Anaphylaxis)  Kiwi (Anaphylaxis)  penicillin (Anaphylaxis)  tetracycline (Anaphylaxis)    Intolerances      Opiate Naive (Y/N):   -iStop reviewed (Y/N): Yes.   No Rx found on iStop review (Ref#:           )    Medications:      MEDICATIONS  (STANDING):  enoxaparin Injectable 40 milliGRAM(s) SubCutaneous every 24 hours  gabapentin 100 milliGRAM(s) Oral three times a day  lactated ringers. 1000 milliLiter(s) (120 mL/Hr) IV Continuous <Continuous>  levETIRAcetam  IVPB 1000 milliGRAM(s) IV Intermittent every 12 hours  melatonin 3 milliGRAM(s) Oral at bedtime  meropenem  IVPB 1000 milliGRAM(s) IV Intermittent every 8 hours  midodrine 10 milliGRAM(s) Oral every 8 hours  mirtazapine 15 milliGRAM(s) Oral at bedtime  senna 1 Tablet(s) Oral at bedtime  valproate sodium IVPB 500 milliGRAM(s) IV Intermittent every 12 hours    MEDICATIONS  (PRN):  acetaminophen   Tablet .. 650 milliGRAM(s) Oral every 6 hours PRN Temp greater or equal to 38.5C (101.3F), Mild Pain (1 - 3)      Labs:    CBC:                        9.6    13.50 )-----------( 145      ( 26 Jul 2021 07:18 )             32.6     CMP:    07-26    138  |  110<H>  |  23  ----------------------------<  94  3.6   |  16<L>  |  1.20    Ca    9.6      26 Jul 2021 07:18  Phos  3.9     07-26  Mg     2.1     07-26    TPro  5.2<L>  /  Alb  2.2<L>  /  TBili  0.3  /  DBili  x   /  AST  21  /  ALT  7<L>  /  AlkPhos  62  07-26     PT/INR - ( 25 Jul 2021 08:45 )   PT: 14.1 sec;   INR: 1.18          PTT - ( 25 Jul 2021 08:45 )  PTT:33.1 sec   Urinalysis Basic - ( 25 Jul 2021 08:51 )    Color: x / Appearance: x / SG: x / pH: x  Gluc: x / Ketone: x  / Bili: x / Urobili: x   Blood: x / Protein: x / Nitrite: x   Leuk Esterase: x / RBC: < 5 /HPF / WBC 5-10 /HPF   Sq Epi: x / Non Sq Epi: 0-5 /HPF / Bacteria: Present /HPF        Imaging:  Reviewed      PEx:  T(C): 39.4 (07-26-21 @ 11:46), Max: 39.4 (07-26-21 @ 11:46)  HR: 119 (07-26-21 @ 11:39) (103 - 130)  BP: 83/55 (07-26-21 @ 11:39) (83/55 - 115/83)  RR: 18 (07-26-21 @ 11:39) (17 - 20)  SpO2: 98% (07-26-21 @ 11:39) (92% - 98%)  Wt(kg): --    Preadmit Karnofsky:  %             Current Karnofsky:     %    BMI:  Wt:  Cachexia (Y/N):      HPI:   75y old Female with PMHx of Melanoma with mets to brain, spine, lung, soft tissue of neck (Dr. 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     Per chart review, pt recently admitted to [facility], was treated for shingles, and was discharged to [facility], where she was discharged from several days prior to presenting to [facility].  Pt also admitted to [facility] 4/[facility], found to have seizures: no jerking movement, would stare the wall for few seconds, with no post-ictal confusion, tongue biting, loss of consciousness, bowel or bladder incontinence. Pt started on Keppra.    At [facility] EED, pt's daughter reported that at baseline, pt is generally able to talk and understand things going around her. Stroke called called. On arrival patient with R hemineglect with head turned toward left, NIHSS 16, she had altered level of consciousness facial palsy, left arm and B/L LE weakness, mild-mod loss of sensation and R hemineglect.     Palliative care consulted for goc in setting of progressive metastatic MM. Pt seen and examined at bedside.  Opens eyes to name, minimally verbal. Generally not following commands.  Unable to provide ROS dt mentation. spontaneously moving bilateral upper extremities.     Pain hx provided by daughter at bedside. Pt with chronic HA, lower back pain. Two weeks prior to recent admission to [facility], pt had been on Fentanyl 25 mcg/hr q72 h and oxycodone 10 mg PO q4h PRN for neoplasm related bone pain. During most recent  [facility] admission, pt seen by palliative care for pain management where her Fentanyl 25 mcg/hr q72 patch was increased to 75 mcg/hr iso uncontrolled pain and PRN opioid use. When pt picked up  from rehab, daughter noticed pt was only on 25 mcg/hr patch. Pt has been taking 2-4 PRNs daily for several days prior to admission. Daughter reports pt also taking gabapentin 100 mg PO BID. Appetite poor, anxious/depressed on remeron 15 mg PO qHS. Increased daytime sleeping in recent weeks, worse following WBRT. No nausea, no constipation.     PAST MEDICAL & SURGICAL HISTORY:  Hypertension  Pulmonary embolism  Melanoma  Shingles  Chronic back pain  Seizures  H/O craniotomy    FAMILY HISTORY:  No pertinent family history in first degree relatives     Reviewed and found non contributory in mother or father    SOCIAL HISTORY:  lives with her adult daughter, Karen.     Allergies    crawfish (Anaphylaxis)  Keflex (Anaphylaxis)  Kiwi (Anaphylaxis)  penicillin (Anaphylaxis)  tetracycline (Anaphylaxis)    Intolerances      Opiate Naive (Y/N):  No  -iStop reviewed (Y/N): Yes.   fentanyl, oxycodone, tramadol Rx found on iStop review (Ref#:[id number]    Medications:      MEDICATIONS  (STANDING):  enoxaparin Injectable 40 milliGRAM(s) SubCutaneous every 24 hours  gabapentin 100 milliGRAM(s) Oral three times a day  lactated ringers. 1000 milliLiter(s) (120 mL/Hr) IV Continuous <Continuous>  levETIRAcetam  IVPB 1000 milliGRAM(s) IV Intermittent every 12 hours  melatonin 3 milliGRAM(s) Oral at bedtime  meropenem  IVPB 1000 milliGRAM(s) IV Intermittent every 8 hours  midodrine 10 milliGRAM(s) Oral every 8 hours  mirtazapine 15 milliGRAM(s) Oral at bedtime  senna 1 Tablet(s) Oral at bedtime  valproate sodium IVPB 500 milliGRAM(s) IV Intermittent every 12 hours    MEDICATIONS  (PRN):  acetaminophen   Tablet .. 650 milliGRAM(s) Oral every 6 hours PRN Temp greater or equal to 38.5C (101.3F), Mild Pain (1 - 3)      Labs:    CBC:                        9.6    13.50 )-----------( 145      ( 26 Jul 2021 07:18 )             32.6     CMP:    07-26    138  |  110<H>  |  23  ----------------------------<  94  3.6   |  16<L>  |  1.20    Ca    9.6      26 Jul 2021 07:18  Phos  3.9     07-26  Mg     2.1     07-26    TPro  5.2<L>  /  Alb  2.2<L>  /  TBili  0.3  /  DBili  x   /  AST  21  /  ALT  7<L>  /  AlkPhos  62  07-26     PT/INR - ( 25 Jul 2021 08:45 )   PT: 14.1 sec;   INR: 1.18          PTT - ( 25 Jul 2021 08:45 )  PTT:33.1 sec   Urinalysis Basic - ( 25 Jul 2021 08:51 )    Color: x / Appearance: x / SG: x / pH: x  Gluc: x / Ketone: x  / Bili: x / Urobili: x   Blood: x / Protein: x / Nitrite: x   Leuk Esterase: x / RBC: < 5 /HPF / WBC 5-10 /HPF   Sq Epi: x / Non Sq Epi: 0-5 /HPF / Bacteria: Present /HPF        Imaging:  Reviewed    < from: CT Brain Stroke Protocol (07.24.21 @ 17:11) >    IMPRESSION:    No acute intracranial hemorrhage or transcortical infarction.    Status post right frontal craniotomywith subjacent right frontal lobe encephalomalacic changes.      < from: CT Angio Neck w/ IV Cont (07.24.21 @ 17:16) >  IMPRESSION:    1. Postoperative changes in the right frontal lobe. Mild leptomeningeal enhancement may be present along the right frontal convexity. An MRI with contrast is recommended to exclude any underlying residual/recurrent malignancy.  2. No large vessel occlusion      < from: CT Chest w/ IV Cont (07.24.21 @ 17:17) >    Impression: 1. There is pulmonary metastatic disease and there are metastases involving left supraclavicular and bilateral axillary lymph nodes, in addition to soft tissue metastases in the shoulder musculature bilaterally. Recommend PET/CT to try to determine the site of primary malignancy. Considerations include lung cancer, breast cancer, metastatic melanoma and renal cell carcinoma.  2. Low-density lesion right lobe of liver adjacent to infiltration of subcutaneous fat in the right lateral chest wall. Recommend clinical correlation to determine if there has been trauma to this region.  3. Multinodular thyroid.  4. Small right pleural effusion.    PEx:  T(C): 39.4 (07-26-21 @ 11:46), Max: 39.4 (07-26-21 @ 11:46)  HR: 119 (07-26-21 @ 11:39) (103 - 130)  BP: 83/55 (07-26-21 @ 11:39) (83/55 - 115/83)  RR: 18 (07-26-21 @ 11:39) (17 - 20)  SpO2: 98% (07-26-21 @ 11:39) (92% - 98%)  Wt(kg): --    General: somnolent, laying in bed, in no acute distress. well developed, well nourished  Eyes: EOMI intact bilaterally. Anicteric sclerae, moist conjunctivae  HENT: Moist mucous membranes  Neck: Trachea midline, supple  Lungs: CTA B/L. No wheezes, rales, or ronchi, fair inspiratory effort  Cardiovascular: Tachycardic. Regular rhythm. No m, r, g  Abdomen: Soft, non-tender non-distended; No rebound or guarding  Extremities:  No clubbing, cyanosis or edema.   MSK: No midline bony tenderness.  RLE cool to touch. distal pulses intact.  Neurological: Alert and oriented to person  Skin: Warm and dry. No obvious rash      Preadmit Karnofsky:  30%             Current Karnofsky:     30%    BMI: 26  Wt: 79  Cachexia (Y/N): N

## 2021-07-26 NOTE — DIETITIAN INITIAL EVALUATION ADULT. - PROBLEM SELECTOR PLAN 4
in the setting of mets to the spine  Home made: Oxycodone 10mg q4 PRN, gabapentin, fentanyl patch   Plan:   - would decrease the dose for now to Oxycodone 5mg PO q4 PRN given patient current condition, as patient is not able to swallow meds would give morphine 2mg IV q4 PRN for pain   - c/w gabapentin 100mg BID PO   - Hold off on fentanyl patch for now   - Patient was given Tizanidine and Flexeril at [facility], would not start for now and monitor for now  - Palliative consult in AM    ADDENDUM: monitor for opioid w/d , pt failed bedside dysphagia, keep NPO, transition PO oxycodone to morphine IV prn for now

## 2021-07-26 NOTE — PROGRESS NOTE ADULT - PROBLEM SELECTOR PLAN 5
Home Med: Ramelteon 8mg QHS  Plan:   - c/w Ramelteon 8mg po qhs in the setting of mets to the spine. Home pain medications include Oxycodone 10mg q4 PRN, gabapentin, fentanyl patch.   - would decrease the dose for now to Oxycodone 5mg PO q4 PRN given patient current condition, as patient is not able to swallow meds would give morphine 2mg IV q4 PRN for pain     - c/w gabapentin 100mg BID PO   - Hold off on fentanyl patch for now   - Patient was given Tizanidine and Flexeril at [facility], would not start for now and monitor for now  - Palliative consult in AM    - monitor for opioid w/d  - pt failed bedside dysphagia, keep NPO  - transition PO oxycodone to morphine 2mg IV q4h prn for now on vEEG   - c/w Keppra and VPA on vEEG   - c/w Keppra 1000mg IV q12h  - c/w valproate 500mg IV q12h

## 2021-07-26 NOTE — DIETITIAN INITIAL EVALUATION ADULT. - OTHER INFO
75y old Female with PMHx of Melanoma with mets to brain, spine  and multiple organs, HTN, PE (on Enoxaparin), seizure on Keppra secondary to brain mets, S/P craniotomy x2(June 2020, July 2021), shingles (recently treated at [facility]), chronic back pain 2/2 mets who presented to the ED brought in by her daughter with concerns of left side weakness, altered mental status and left side facial droop. Given patients history of seizure recently possibly due to mets the presentation of the patient is possibly due to seizures, as stroke workup was done and is less likely the cause, however will need to follow up with MRI. Also her presentation could be due to polypharmacy as patient is on multiple medications for pain and anxiolytics. Patient is being admitted for monitoring and further management.      Pt seen in room, resting in bed. Family member present. Pt is s/p swallow bedside assessment 6/25 with recs for "NPO+short-term alternative means of nutrition/hydration" Pt currently on puree diet w/nectar thick liquids. Discussed with team. Per team, pt mentation improved from SLP eval therefore comfortable with her receiving PO. Per PCA, pt took few spoonfuls of puree and sips of thickened Ensure for breakfast. Recommend SLP follow up today to determine safety of PO. No prior wts to assess, admit wt 176lbs. No reports N/V/D/C. Skin with stage 2 pressure injury to thoracic spine. No pain noted. Please see full recs below. Will continue to follow per RD protocol.

## 2021-07-27 DIAGNOSIS — B34.2 CORONAVIRUS INFECTION, UNSPECIFIED: ICD-10-CM

## 2021-07-27 LAB
-  AMPICILLIN: SIGNIFICANT CHANGE UP
-  CIPROFLOXACIN: SIGNIFICANT CHANGE UP
-  DAPTOMYCIN: SIGNIFICANT CHANGE UP
-  LEVOFLOXACIN: SIGNIFICANT CHANGE UP
-  LINEZOLID: SIGNIFICANT CHANGE UP
-  NITROFURANTOIN: SIGNIFICANT CHANGE UP
-  TETRACYCLINE: SIGNIFICANT CHANGE UP
-  VANCOMYCIN: SIGNIFICANT CHANGE UP
ALBUMIN SERPL ELPH-MCNC: 2.6 G/DL — LOW (ref 3.3–5)
ALP SERPL-CCNC: 71 U/L — SIGNIFICANT CHANGE UP (ref 40–120)
ALT FLD-CCNC: 8 U/L — LOW (ref 10–45)
ANION GAP SERPL CALC-SCNC: 12 MMOL/L — SIGNIFICANT CHANGE UP (ref 5–17)
AST SERPL-CCNC: 19 U/L — SIGNIFICANT CHANGE UP (ref 10–40)
BASOPHILS # BLD AUTO: 0.04 K/UL — SIGNIFICANT CHANGE UP (ref 0–0.2)
BASOPHILS NFR BLD AUTO: 0.4 % — SIGNIFICANT CHANGE UP (ref 0–2)
BILIRUB SERPL-MCNC: 0.3 MG/DL — SIGNIFICANT CHANGE UP (ref 0.2–1.2)
BUN SERPL-MCNC: 18 MG/DL — SIGNIFICANT CHANGE UP (ref 7–23)
CALCIUM SERPL-MCNC: 9.8 MG/DL — SIGNIFICANT CHANGE UP (ref 8.4–10.5)
CHLORIDE SERPL-SCNC: 111 MMOL/L — HIGH (ref 96–108)
CO2 SERPL-SCNC: 20 MMOL/L — LOW (ref 22–31)
CREAT SERPL-MCNC: 0.8 MG/DL — SIGNIFICANT CHANGE UP (ref 0.5–1.3)
CULTURE RESULTS: SIGNIFICANT CHANGE UP
EOSINOPHIL # BLD AUTO: 0.16 K/UL — SIGNIFICANT CHANGE UP (ref 0–0.5)
EOSINOPHIL NFR BLD AUTO: 1.8 % — SIGNIFICANT CHANGE UP (ref 0–6)
GLUCOSE SERPL-MCNC: 78 MG/DL — SIGNIFICANT CHANGE UP (ref 70–99)
HCT VFR BLD CALC: 35.7 % — SIGNIFICANT CHANGE UP (ref 34.5–45)
HGB BLD-MCNC: 10.9 G/DL — LOW (ref 11.5–15.5)
IMM GRANULOCYTES NFR BLD AUTO: 1.3 % — SIGNIFICANT CHANGE UP (ref 0–1.5)
LYMPHOCYTES # BLD AUTO: 0.61 K/UL — LOW (ref 1–3.3)
LYMPHOCYTES # BLD AUTO: 6.9 % — LOW (ref 13–44)
MAGNESIUM SERPL-MCNC: 2.2 MG/DL — SIGNIFICANT CHANGE UP (ref 1.6–2.6)
MCHC RBC-ENTMCNC: 30.4 PG — SIGNIFICANT CHANGE UP (ref 27–34)
MCHC RBC-ENTMCNC: 30.5 GM/DL — LOW (ref 32–36)
MCV RBC AUTO: 99.7 FL — SIGNIFICANT CHANGE UP (ref 80–100)
METHOD TYPE: SIGNIFICANT CHANGE UP
METHOD TYPE: SIGNIFICANT CHANGE UP
MONOCYTES # BLD AUTO: 0.44 K/UL — SIGNIFICANT CHANGE UP (ref 0–0.9)
MONOCYTES NFR BLD AUTO: 4.9 % — SIGNIFICANT CHANGE UP (ref 2–14)
NEUTROPHILS # BLD AUTO: 7.53 K/UL — HIGH (ref 1.8–7.4)
NEUTROPHILS NFR BLD AUTO: 84.7 % — HIGH (ref 43–77)
NRBC # BLD: 0 /100 WBCS — SIGNIFICANT CHANGE UP (ref 0–0)
ORGANISM # SPEC MICROSCOPIC CNT: SIGNIFICANT CHANGE UP
PHOSPHATE SERPL-MCNC: 3 MG/DL — SIGNIFICANT CHANGE UP (ref 2.5–4.5)
PLATELET # BLD AUTO: 167 K/UL — SIGNIFICANT CHANGE UP (ref 150–400)
POTASSIUM SERPL-MCNC: 3.6 MMOL/L — SIGNIFICANT CHANGE UP (ref 3.5–5.3)
POTASSIUM SERPL-SCNC: 3.6 MMOL/L — SIGNIFICANT CHANGE UP (ref 3.5–5.3)
PROT SERPL-MCNC: 5.4 G/DL — LOW (ref 6–8.3)
RBC # BLD: 3.58 M/UL — LOW (ref 3.8–5.2)
RBC # FLD: 14 % — SIGNIFICANT CHANGE UP (ref 10.3–14.5)
SODIUM SERPL-SCNC: 143 MMOL/L — SIGNIFICANT CHANGE UP (ref 135–145)
SPECIMEN SOURCE: SIGNIFICANT CHANGE UP
VALPROATE SERPL-MCNC: 5.9 UG/ML — LOW (ref 50–100)
VZV IGG FLD QL IA: >4000 INDEX — SIGNIFICANT CHANGE UP
VZV IGG FLD QL IA: POSITIVE — SIGNIFICANT CHANGE UP
WBC # BLD: 8.9 K/UL — SIGNIFICANT CHANGE UP (ref 3.8–10.5)
WBC # FLD AUTO: 8.9 K/UL — SIGNIFICANT CHANGE UP (ref 3.8–10.5)

## 2021-07-27 PROCEDURE — 99232 SBSQ HOSP IP/OBS MODERATE 35: CPT | Mod: GC

## 2021-07-27 PROCEDURE — 99233 SBSQ HOSP IP/OBS HIGH 50: CPT

## 2021-07-27 PROCEDURE — 99497 ADVNCD CARE PLAN 30 MIN: CPT

## 2021-07-27 PROCEDURE — 95720 EEG PHY/QHP EA INCR W/VEEG: CPT

## 2021-07-27 PROCEDURE — 72158 MRI LUMBAR SPINE W/O & W/DYE: CPT | Mod: 26

## 2021-07-27 PROCEDURE — 70553 MRI BRAIN STEM W/O & W/DYE: CPT | Mod: 26

## 2021-07-27 PROCEDURE — 99233 SBSQ HOSP IP/OBS HIGH 50: CPT | Mod: GC

## 2021-07-27 RX ORDER — LINEZOLID 600 MG/300ML
600 INJECTION, SOLUTION INTRAVENOUS EVERY 12 HOURS
Refills: 0 | Status: DISCONTINUED | OUTPATIENT
Start: 2021-07-27 | End: 2021-07-27

## 2021-07-27 RX ORDER — VALPROIC ACID (AS SODIUM SALT) 250 MG/5ML
750 SOLUTION, ORAL ORAL EVERY 12 HOURS
Refills: 0 | Status: DISCONTINUED | OUTPATIENT
Start: 2021-07-27 | End: 2021-07-31

## 2021-07-27 RX ORDER — VALPROIC ACID (AS SODIUM SALT) 250 MG/5ML
500 SOLUTION, ORAL ORAL EVERY 8 HOURS
Refills: 0 | Status: DISCONTINUED | OUTPATIENT
Start: 2021-07-27 | End: 2021-07-27

## 2021-07-27 RX ORDER — FENTANYL CITRATE 50 UG/ML
1 INJECTION INTRAVENOUS
Refills: 0 | Status: DISCONTINUED | OUTPATIENT
Start: 2021-07-27 | End: 2021-08-01

## 2021-07-27 RX ORDER — VALPROIC ACID (AS SODIUM SALT) 250 MG/5ML
750 SOLUTION, ORAL ORAL EVERY 12 HOURS
Refills: 0 | Status: DISCONTINUED | OUTPATIENT
Start: 2021-07-27 | End: 2021-07-27

## 2021-07-27 RX ORDER — MORPHINE SULFATE 50 MG/1
2 CAPSULE, EXTENDED RELEASE ORAL EVERY 4 HOURS
Refills: 0 | Status: DISCONTINUED | OUTPATIENT
Start: 2021-07-27 | End: 2021-07-28

## 2021-07-27 RX ORDER — LIDOCAINE 4 G/100G
1 CREAM TOPICAL EVERY 24 HOURS
Refills: 0 | Status: DISCONTINUED | OUTPATIENT
Start: 2021-07-27 | End: 2021-08-01

## 2021-07-27 RX ADMIN — MORPHINE SULFATE 2 MILLIGRAM(S): 50 CAPSULE, EXTENDED RELEASE ORAL at 19:12

## 2021-07-27 RX ADMIN — GABAPENTIN 100 MILLIGRAM(S): 400 CAPSULE ORAL at 23:45

## 2021-07-27 RX ADMIN — FENTANYL CITRATE 1 PATCH: 50 INJECTION INTRAVENOUS at 12:50

## 2021-07-27 RX ADMIN — LEVETIRACETAM 400 MILLIGRAM(S): 250 TABLET, FILM COATED ORAL at 05:46

## 2021-07-27 RX ADMIN — SENNA PLUS 1 TABLET(S): 8.6 TABLET ORAL at 23:45

## 2021-07-27 RX ADMIN — GABAPENTIN 100 MILLIGRAM(S): 400 CAPSULE ORAL at 13:18

## 2021-07-27 RX ADMIN — MIDODRINE HYDROCHLORIDE 10 MILLIGRAM(S): 2.5 TABLET ORAL at 00:46

## 2021-07-27 RX ADMIN — Medication 266 MILLIGRAM(S): at 18:01

## 2021-07-27 RX ADMIN — MIDODRINE HYDROCHLORIDE 10 MILLIGRAM(S): 2.5 TABLET ORAL at 09:37

## 2021-07-27 RX ADMIN — Medication 3 MILLIGRAM(S): at 23:46

## 2021-07-27 RX ADMIN — MIRTAZAPINE 15 MILLIGRAM(S): 45 TABLET, ORALLY DISINTEGRATING ORAL at 23:46

## 2021-07-27 RX ADMIN — Medication 27.5 MILLIGRAM(S): at 09:37

## 2021-07-27 RX ADMIN — Medication 266 MILLIGRAM(S): at 06:09

## 2021-07-27 RX ADMIN — GABAPENTIN 100 MILLIGRAM(S): 400 CAPSULE ORAL at 05:48

## 2021-07-27 RX ADMIN — MEROPENEM 500 MILLIGRAM(S): 1 INJECTION INTRAVENOUS at 05:46

## 2021-07-27 RX ADMIN — MORPHINE SULFATE 2 MILLIGRAM(S): 50 CAPSULE, EXTENDED RELEASE ORAL at 20:30

## 2021-07-27 RX ADMIN — MORPHINE SULFATE 2 MILLIGRAM(S): 50 CAPSULE, EXTENDED RELEASE ORAL at 12:31

## 2021-07-27 RX ADMIN — MORPHINE SULFATE 2 MILLIGRAM(S): 50 CAPSULE, EXTENDED RELEASE ORAL at 12:48

## 2021-07-27 RX ADMIN — LEVETIRACETAM 400 MILLIGRAM(S): 250 TABLET, FILM COATED ORAL at 17:34

## 2021-07-27 RX ADMIN — Medication 28.75 MILLIGRAM(S): at 19:08

## 2021-07-27 RX ADMIN — FENTANYL CITRATE 1 PATCH: 50 INJECTION INTRAVENOUS at 18:10

## 2021-07-27 NOTE — PROGRESS NOTE ADULT - PROBLEM SELECTOR PLAN 3
Pt w/ metastatic melanoma with metastases at brain, spine, multiple sites. Poor prognosis, will likely need palliative consult, GOC discussion. Obtain prior workup and treatment from [facility].  - Dr. Salas to call daughters/family to discuss pt's decline  - palliative consulted, appreciate recs Associated with left side weakness and left side facial droop on admission in the setting of metastasis to brain 2/2 melanoma; less likely metabolic encephalopathy VSS. Mild Leucocytosis around 11. CT brain negative for acute hemorrhage or infarction.  - q4hr general neurology checks and vitals  - f/u MRI Brain with contrast to evaluate for brain mets  - c/w keppra 1000mg IV q12h  - increase Valproic acid to 750mg IV q12h  - obtain depakote serum level after 4th dose (on 7/29 AM)  - f/u vEEG  - discussed with on-call epileptologist  - Neuro consulted, f/u recs  - STAT CTH if change in mentation     ADDENDUM: obtain UA

## 2021-07-27 NOTE — PROGRESS NOTE ADULT - CONVERSATION DETAILS
Pts chart reviewed. Pt unable to participate in [facility] dt mentation. [place] and Karen understand pt has incurable cancer and is in the last months of her life.  For now, daughters want to continue further workup ie MRI H to rule out residual/recurrent melanoma and LP before moving towards comfort and hospice care. Controlling pt's pain is most important to them.     Discussed poor functional and cognitive outcomes if pt resuscitated, intubated. Daughters want to review MOLST and discuss overnight. Continue full code for now.

## 2021-07-27 NOTE — PROGRESS NOTE ADULT - SUBJECTIVE AND OBJECTIVE BOX
Internal Medicine Progress Note  Steffen Fisher, PGY-1  Pager: [phone]    ******INCOMPLETE******    Patient is a 75y old  Female who presents with a chief complaint of AMS (26 Jul 2021 16:25)    OVERNIGHT EVENTS/INTERVAL HPI:    REVIEW OF SYSTEMS:  All other review of systems is negative unless indicated above.    OBJECTIVE:  T(C): 37.1 (07-27-21 @ 04:43), Max: 39.4 (07-26-21 @ 11:46)  HR: 112 (07-27-21 @ 04:43) (102 - 119)  BP: 108/72 (07-27-21 @ 04:43) (83/55 - 108/72)  RR: 18 (07-27-21 @ 04:43) (16 - 18)  SpO2: 100% (07-27-21 @ 04:43) (96% - 100%)  Daily       Physical Exam:  General: in no acute distress  Eyes: EOMI intact bilaterally. Anicteric sclerae, moist conjunctivae  HENT: Moist mucous membranes  Neck: Trachea midline, supple  Lungs: CTA B/L. No wheezes, rales, or ronchi  Cardiovascular: RRR. No murmurs, rubs, or gallops  Abdomen: Soft, non-tender non-distended; No rebound or guarding  Extremities: Normal range of motion, No clubbing, cyanosis or edema  MSK: No midline bony tenderness. No CVA tenderness bilaterally  Neurological: Alert and oriented x3  Skin: Warm and dry. No obvious rash     Medications:  MEDICATIONS  (STANDING):  acyclovir IVPB 800 milliGRAM(s) IV Intermittent every 12 hours  gabapentin 100 milliGRAM(s) Oral three times a day  levETIRAcetam  IVPB 1000 milliGRAM(s) IV Intermittent every 12 hours  melatonin 3 milliGRAM(s) Oral at bedtime  meropenem  IVPB 2000 milliGRAM(s) IV Intermittent every 12 hours  midodrine 10 milliGRAM(s) Oral every 8 hours  mirtazapine 15 milliGRAM(s) Oral at bedtime  senna 1 Tablet(s) Oral at bedtime  valproate sodium IVPB 500 milliGRAM(s) IV Intermittent every 12 hours    MEDICATIONS  (PRN):  acetaminophen   Tablet .. 650 milliGRAM(s) Oral every 6 hours PRN Temp greater or equal to 38.5C (101.3F), Mild Pain (1 - 3)  hydrocortisone 2.5% Ointment 1 Application(s) Topical three times a day PRN Itching      Labs:                        9.4    11.60 )-----------( 146      ( 26 Jul 2021 15:29 )             30.3     07-26    140  |  110<H>  |  21  ----------------------------<  102<H>  3.2<L>   |  19<L>  |  1.04    Ca    9.2      26 Jul 2021 15:29  Phos  3.9     07-26  Mg     2.1     07-26    TPro  4.9<L>  /  Alb  2.3<L>  /  TBili  0.3  /  DBili  x   /  AST  20  /  ALT  8<L>  /  AlkPhos  85  07-26    PT/INR - ( 25 Jul 2021 08:45 )   PT: 14.1 sec;   INR: 1.18          PTT - ( 25 Jul 2021 08:45 )  PTT:33.1 sec  Urinalysis Basic - ( 25 Jul 2021 08:51 )    Color: x / Appearance: x / SG: x / pH: x  Gluc: x / Ketone: x  / Bili: x / Urobili: x   Blood: x / Protein: x / Nitrite: x   Leuk Esterase: x / RBC: < 5 /HPF / WBC 5-10 /HPF   Sq Epi: x / Non Sq Epi: 0-5 /HPF / Bacteria: Present /HPF      SARS-CoV-2: NotDetec (26 Jul 2021 18:52)  COVID-19 PCR: Negative (24 Jul 2021 18:11)      Radiology: Reviewed Internal Medicine Progress Note  Steffen Fisher, PGY-1  Pager: [phone]    Patient is a 75y old  Female who presents with a chief complaint of AMS (26 Jul 2021 16:25)    OVERNIGHT EVENTS/INTERVAL HPI: As per night team, no overnight events. Patient seen and examined at bedside. Offers no complaints at this time. When asked, patient denies fevers, sweats, chills, SOB, dyspnea, chest pain, abdominal pain.    REVIEW OF SYSTEMS:  All other review of systems is negative unless indicated above.    OBJECTIVE:  T(C): 37.1 (07-27-21 @ 04:43), Max: 39.4 (07-26-21 @ 11:46)  HR: 112 (07-27-21 @ 04:43) (102 - 119)  BP: 108/72 (07-27-21 @ 04:43) (83/55 - 108/72)  RR: 18 (07-27-21 @ 04:43) (16 - 18)  SpO2: 100% (07-27-21 @ 04:43) (96% - 100%)  Daily       Physical Exam:  General: in no acute distress. Sleeping, but easily arousable to verbal stimuli. Minimally conversant.  Eyes: EOMI intact bilaterally. Anicteric sclerae, moist conjunctivae  HENT: Moist mucous membranes  Neck: Trachea midline, supple  Lungs: CTA B/L. No wheezes, rales, or ronchi  Cardiovascular: RRR. No murmurs, rubs, or gallops  Abdomen: Soft, non-tender non-distended; No rebound or guarding  Extremities: Normal range of motion, No clubbing, cyanosis or edema  MSK: No midline bony tenderness. No CVA tenderness bilaterally. Distal pulses intact.  Neurological: Alert and oriented x1  Skin: Warm and dry. No obvious rash     Medications:  MEDICATIONS  (STANDING):  acyclovir IVPB 800 milliGRAM(s) IV Intermittent every 12 hours  gabapentin 100 milliGRAM(s) Oral three times a day  levETIRAcetam  IVPB 1000 milliGRAM(s) IV Intermittent every 12 hours  melatonin 3 milliGRAM(s) Oral at bedtime  meropenem  IVPB 2000 milliGRAM(s) IV Intermittent every 12 hours  midodrine 10 milliGRAM(s) Oral every 8 hours  mirtazapine 15 milliGRAM(s) Oral at bedtime  senna 1 Tablet(s) Oral at bedtime  valproate sodium IVPB 500 milliGRAM(s) IV Intermittent every 12 hours    MEDICATIONS  (PRN):  acetaminophen   Tablet .. 650 milliGRAM(s) Oral every 6 hours PRN Temp greater or equal to 38.5C (101.3F), Mild Pain (1 - 3)  hydrocortisone 2.5% Ointment 1 Application(s) Topical three times a day PRN Itching    Labs:                        9.4    11.60 )-----------( 146      ( 26 Jul 2021 15:29 )             30.3     07-26    140  |  110<H>  |  21  ----------------------------<  102<H>  3.2<L>   |  19<L>  |  1.04    Ca    9.2      26 Jul 2021 15:29  Phos  3.9     07-26  Mg     2.1     07-26    TPro  4.9<L>  /  Alb  2.3<L>  /  TBili  0.3  /  DBili  x   /  AST  20  /  ALT  8<L>  /  AlkPhos  85  07-26    PT/INR - ( 25 Jul 2021 08:45 )   PT: 14.1 sec;   INR: 1.18          PTT - ( 25 Jul 2021 08:45 )  PTT:33.1 sec  Urinalysis Basic - ( 25 Jul 2021 08:51 )    Color: x / Appearance: x / SG: x / pH: x  Gluc: x / Ketone: x  / Bili: x / Urobili: x   Blood: x / Protein: x / Nitrite: x   Leuk Esterase: x / RBC: < 5 /HPF / WBC 5-10 /HPF   Sq Epi: x / Non Sq Epi: 0-5 /HPF / Bacteria: Present /HPF      SARS-CoV-2: NotDetec (26 Jul 2021 18:52)  COVID-19 PCR: Negative (24 Jul 2021 18:11)      Radiology: Reviewed   Internal Medicine Progress Note  Steffen Fisher, PGY-1  Pager: [phone]    HOSPITAL COURSE:    Patient is a 76 y/o F with PMHx of Melanoma with mets to brain, spine and multiple organs, HTN, PE (on Lovenox), 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 Stroke code was called on presentation and as per neurology, valproic acid was started with suspicion for seizure. On 7/25, pt found to be tachycardic to 120s with SPB in the 70s-80s with rectal temperature of 102.7. Lactate 2.6. Pt was given 2.5L LR with improvement in HR and BP to the 110s. Vancomycin/meropenem started for possible HCAP/post-obstructive PNA. Abx regimen chosen due to PCN anaphylaxis, cephalosporin angioedema, and tetracycline allergies. ID approved for meropenem. UA was negative. vEEG placed, MRSA swab obtained, depakote/keppra started, per neuro recs. S/f LLE DVT, LE doppler placed. Pt with IVC in place. Given recent brain mets, pt's outpatient doctors had decreased DVT tx from lovenox BID to 40mg QD. Pt with poor PO ability, improved over course of day. Bladder scan showed PVR, gave straight cath. Overnight on 7/25, troponin peaked/downtrended. Rectal temp 100.2, pt refused oral meds. On 7/26, dilaudid and oxycodone were held in setting of AMS and severe sepsis. Palliative consulted, recommending pain meds. 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 Hydrocortisone cream offered for back irritation 2/2 history of XRT. vEEG without seizure activity thus far. On 7/27, per ID recs, meropenem was stopped due to interaction with valproate. Per ID recs, valproate increased to 750mg BID and switched to PO. RVP positive for coronavirus. Morphine, fentanyl patch, and lidocaine patch started for cancer-related pain. Pt scheduled for MRI of brain and L-spine to evaluate metastases, with plans for lumbar puncture afterwards.     ------------    Patient is a 75y old  Female who presents with a chief complaint of AMS (26 Jul 2021 16:25)    OVERNIGHT EVENTS/INTERVAL HPI: As per night team, no overnight events. Patient seen and examined at bedside. Offers no complaints at this time. When asked, patient denies fevers, sweats, chills, SOB, dyspnea, chest pain, abdominal pain.    REVIEW OF SYSTEMS:  All other review of systems is negative unless indicated above.    OBJECTIVE:  T(C): 37.1 (07-27-21 @ 04:43), Max: 39.4 (07-26-21 @ 11:46)  HR: 112 (07-27-21 @ 04:43) (102 - 119)  BP: 108/72 (07-27-21 @ 04:43) (83/55 - 108/72)  RR: 18 (07-27-21 @ 04:43) (16 - 18)  SpO2: 100% (07-27-21 @ 04:43) (96% - 100%)  Daily       Physical Exam:  General: in no acute distress. Sleeping, but easily arousable to verbal stimuli. Minimally conversant.  Eyes: EOMI intact bilaterally. Anicteric sclerae, moist conjunctivae  HENT: Moist mucous membranes  Neck: Trachea midline, supple  Lungs: CTA B/L. No wheezes, rales, or ronchi  Cardiovascular: RRR. No murmurs, rubs, or gallops  Abdomen: Soft, non-tender non-distended; No rebound or guarding  Extremities: Normal range of motion, No clubbing, cyanosis or edema  MSK: No midline bony tenderness. No CVA tenderness bilaterally. Distal pulses intact.  Neurological: Alert and oriented x1  Skin: Warm and dry. No obvious rash     Medications:  MEDICATIONS  (STANDING):  acyclovir IVPB 800 milliGRAM(s) IV Intermittent every 12 hours  gabapentin 100 milliGRAM(s) Oral three times a day  levETIRAcetam  IVPB 1000 milliGRAM(s) IV Intermittent every 12 hours  melatonin 3 milliGRAM(s) Oral at bedtime  meropenem  IVPB 2000 milliGRAM(s) IV Intermittent every 12 hours  midodrine 10 milliGRAM(s) Oral every 8 hours  mirtazapine 15 milliGRAM(s) Oral at bedtime  senna 1 Tablet(s) Oral at bedtime  valproate sodium IVPB 500 milliGRAM(s) IV Intermittent every 12 hours    MEDICATIONS  (PRN):  acetaminophen   Tablet .. 650 milliGRAM(s) Oral every 6 hours PRN Temp greater or equal to 38.5C (101.3F), Mild Pain (1 - 3)  hydrocortisone 2.5% Ointment 1 Application(s) Topical three times a day PRN Itching    Labs:                        9.4    11.60 )-----------( 146      ( 26 Jul 2021 15:29 )             30.3     07-26    140  |  110<H>  |  21  ----------------------------<  102<H>  3.2<L>   |  19<L>  |  1.04    Ca    9.2      26 Jul 2021 15:29  Phos  3.9     07-26  Mg     2.1     07-26    TPro  4.9<L>  /  Alb  2.3<L>  /  TBili  0.3  /  DBili  x   /  AST  20  /  ALT  8<L>  /  AlkPhos  85  07-26    PT/INR - ( 25 Jul 2021 08:45 )   PT: 14.1 sec;   INR: 1.18          PTT - ( 25 Jul 2021 08:45 )  PTT:33.1 sec  Urinalysis Basic - ( 25 Jul 2021 08:51 )    Color: x / Appearance: x / SG: x / pH: x  Gluc: x / Ketone: x  / Bili: x / Urobili: x   Blood: x / Protein: x / Nitrite: x   Leuk Esterase: x / RBC: < 5 /HPF / WBC 5-10 /HPF   Sq Epi: x / Non Sq Epi: 0-5 /HPF / Bacteria: Present /HPF      SARS-CoV-2: NotDetec (26 Jul 2021 18:52)  COVID-19 PCR: Negative (24 Jul 2021 18:11)      Radiology: Reviewed

## 2021-07-27 NOTE — PROGRESS NOTE ADULT - PROBLEM SELECTOR PLAN 6
-pt unable to participate in GOC, discussion as above with pt's daughters  -understand pt has incurable cancer and is in the last months of her life   -per Karen, pt "had wanted to fight and do everything until there was nothing left"  -explored role of hospice with daughters  -follow up code status discussion tomorrow, continue full code

## 2021-07-27 NOTE — PROGRESS NOTE ADULT - PROBLEM SELECTOR PLAN 7
76 yo with metastatic melanoma, poor performance status, infection, progressive symptom burden and unlikely candidate for further treatment. poor prognosis. goc ongoing, pt would benefit from hospice if within GOC.  -pt with poorly controlled pain without long acting opioid, recs above   -all questions answered, emotional support provided  -dw primary team   -please contact Palliative Medicine at [phone] for any acute symptoms or further questions  -will continue to follow with you

## 2021-07-27 NOTE — PROGRESS NOTE ADULT - ATTENDING COMMENTS
74yo woman with metastatic melanoma, last on chemo in May and continued with right hemispheric radiation treatment for the right frontal metastatic focus.    concern for neurobehavioral decline, 2 days after d/c of acyclovir for V1 VZV outbreak.  vEEG no seizures, but with right frontal sharp waves and periodic discharges.  stroke work-up, negative for acute stroke/hemorrhage.    DDX for decline includes VZV encephalitis, metastatic growth/edema, new metastatic focus, radiation-related effects.    Plan:  1) MRI brain +/- C (if renally cleared for contrast)  2) LP, including VZV PCR and IgG/IgM may help identify focus, though sensitivity may be low due to treatment.  3) agree with continued acyclovir treatment.  4) consideration for steroids if MRI shows edema and no signs of infection

## 2021-07-27 NOTE — PROGRESS NOTE ADULT - ASSESSMENT
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  Patient is being admitted for monitoring and further management.

## 2021-07-27 NOTE — PROGRESS NOTE ADULT - ATTENDING COMMENTS
Patient was seen and examined with the resident team today.  I agree with Dr. Fisher's assessment and plan with the following exceptions/additions:     Briefly, this is sa 74yo woman with a PMH of stage 4 melanoma c/b brain and spine mets w/seizures ([facility] - Dr. Gonsales) s/p craniotomy, PE (on Lovenox), HTN and recent [facility]-admission for herpes ophthalmicus who presented from [facility] with AMS and L-sided neuro deficits, found to have metabolic encephalopathy 2/2 sepsis of unclear source, along with a type 2 NSTEMI.  Of note, stroke work-up was negative.  RVP subsequently +non-COVID 19 Coronavirus.     #Sepsis - potential due to viral PNA; discuss with ID ongoing Meropenem in light of RVP results; VZV suspicion ongoing given significant encephalitis, on Acyclovir and waiting for LP clearance pending MRI's   #Coronavirus PNA - supportive care; wean supplemental O2 as able   #Metastatic Melanoma w/Brain, Spine and Additional Mets - collateral from [facility], Dr. Mora to call family about her decline; brain and lumbar MRI pending AND then LP   #Cancer-related pain - on outpatient Fentanyl and Oxycodone per iSTOP; restart regimen as able   #Hx of Seizures - on vEEG, discuss with Epilepsy duration as needs MRI's; c/w Keppra and VPA and transition to PO when able   #HTN - on BB and Amlodipine per med rec, hold all BP meds indefinitely given she's been very hypotensive here and actually requiring Midodrine   #Hx of PE - c/w therapeutic Lovenox BID   #Recent Herpes Ophthalmicus - c/w gabapentin; ID work-up as per above   #Type 2 NSTEMI - trops downtrending; mgmt of sepsis as per above  #ELISABETH - baseline unclear; will continue to trend Cr  #DVT PPx - Lovenox as per above  #Dispo - TBD     Deena Roldan  [phone] Patient was seen and examined with the resident team today.  I agree with Dr. Fisher's assessment and plan with the following exceptions/additions:     Briefly, this is sa 76yo woman with a PMH of stage 4 melanoma c/b brain and spine mets w/seizures ([facility] - Dr. Gonsales) s/p craniotomy, PE (on Lovenox), HTN and recent [facility]-admission for herpes ophthalmicus who presented from [facility] with AMS and L-sided neuro deficits, found to have metabolic encephalopathy 2/2 sepsis of unclear source, along with a type 2 NSTEMI.  Of note, stroke work-up was negative.  RVP subsequently +non-COVID 19 Coronavirus.     #Sepsis - potential due to viral PNA; discuss with ID ongoing Meropenem in light of RVP results; VZV suspicion ongoing given significant encephalitis, on Acyclovir and waiting for LP clearance pending MRI's   #Coronavirus PNA - supportive care; wean supplemental O2 as able   #Metastatic Melanoma w/Brain, Spine and Additional Mets - collateral from [facility], Dr. Mora to call family about her decline; brain and lumbar MRI pending AND then LP   #Cancer-related pain - on outpatient Fentanyl and Oxycodone per iSTOP; restart regimen as able   #Hx of Seizures - on vEEG, discuss with Epilepsy duration as needs MRI's; c/w Keppra and VPA and transition to PO when able   #HTN - on BB and Amlodipine per med rec, hold all BP meds indefinitely given she's been very hypotensive here and actually requiring Midodrine   #Hx of PE - c/w therapeutic Lovenox BID   #Recent Herpes Ophthalmicus - c/w gabapentin; ID work-up as per above   #Type 2 NSTEMI - trops downtrending; mgmt of sepsis as per above  #ELISABETH - baseline unclear; will continue to trend Cr  #Bacturia - E. faecium in urine but U/A not consistent with UTI; pt unable to report ROS; will hold off on treating and monitor   #DVT PPx - Lovenox as per above  #Dispo - TBD     Deena Roldan  [phone]

## 2021-07-27 NOTE — PROGRESS NOTE ADULT - PROBLEM SELECTOR PLAN 6
Recent history of PE. Pt takes weight based home Lovenox 40mg SQ daily    - c/w Lovenox 40mg SQ daily on vEEG   - will remove vEEG leads prior to MRI brain  - c/w Keppra 1000mg IV q12h  - c/w valproate 500mg IV q12h on vEEG  - will remove vEEG leads prior to MRI brain  - c/w Keppra 1000mg IV q12h  - c/w valproate 500mg IV q12h

## 2021-07-27 NOTE — PROGRESS NOTE ADULT - PROBLEM SELECTOR PLAN 7
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 Pt takes weight based home Lovenox 40mg SQ daily  - c/w Lovenox 40mg SQ daily Recent history of PE. Pt takes weight based home Lovenox 40mg SQ daily (reduced dose due to hx brain mets)  - c/w Lovenox 40mg SQ daily

## 2021-07-27 NOTE — PROGRESS NOTE ADULT - SUBJECTIVE AND OBJECTIVE BOX
SUBJECTIVE: pt seen and examined. opens eyes to name. groans when asked if in pain. otherwise unable to provide ROS. per daughters and bedside nursing, pt with uncontrolled pain throughout day/night.       UNABLE TO OBTAIN  due to: pt mentation     PEx:  T(C): 37.1 (07-27-21 @ 11:38), Max: 37.1 (07-27-21 @ 04:43)  HR: 116 (07-27-21 @ 11:38) (102 - 116)  BP: 107/70 (07-27-21 @ 11:38) (93/59 - 108/72)  RR: 18 (07-27-21 @ 11:38) (16 - 18)  SpO2: 100% (07-27-21 @ 11:38) (99% - 100%)  Wt(kg): --    General: somnolent, laying in bed, well developed, well nourished, furrowed brow  Eyes: EOMI intact bilaterally. Anicteric sclerae, moist conjunctivae  HENT: Moist mucous membranes  Neck: Trachea midline, supple  Lungs: CTA B/L. No wheezes, rales, or ronchi, fair inspiratory effort  Cardiovascular: Tachycardic. Regular rhythm. No m, r, g  Abdomen: Soft, non-tender non-distended; No rebound or guarding  Extremities:  No clubbing, cyanosis or edema.   MSK: functional quadriplegia  RLE cool to touch.  Neurological: Alert and oriented to person  Skin: Warm and dry. No obvious rash    	   ALLERGIES: crawfish (Anaphylaxis)  Keflex (Anaphylaxis)  Kiwi (Anaphylaxis)  penicillin (Anaphylaxis)  tetracycline (Anaphylaxis)      MEDICATIONS: REVIEWED  MEDICATIONS  (STANDING):  acyclovir IVPB 800 milliGRAM(s) IV Intermittent every 12 hours  fentaNYL   Patch  12 MICROgram(s)/Hr 1 Patch Transdermal every 72 hours  gabapentin 100 milliGRAM(s) Oral three times a day  levETIRAcetam  IVPB 1000 milliGRAM(s) IV Intermittent every 12 hours  lidocaine   5% Patch 1 Patch Transdermal every 24 hours  melatonin 3 milliGRAM(s) Oral at bedtime  midodrine 10 milliGRAM(s) Oral every 8 hours  mirtazapine 15 milliGRAM(s) Oral at bedtime  senna 1 Tablet(s) Oral at bedtime  valproic acid 750 milliGRAM(s) Oral every 12 hours    MEDICATIONS  (PRN):  acetaminophen   Tablet .. 650 milliGRAM(s) Oral every 6 hours PRN Temp greater or equal to 38.5C (101.3F), Mild Pain (1 - 3)  hydrocortisone 2.5% Ointment 1 Application(s) Topical three times a day PRN Itching  morphine  - Injectable 2 milliGRAM(s) IV Push every 4 hours PRN Severe Pain (7 - 10)    CRITICAL CARE:  [ ]Shock Present  [ ]Septic [ ]Cardiogenic [ ]Neurologic [ ]Hypovolemic    [ ]Vasopressors [ ]Inotropes    [ ]Respiratory failure present   [ ]Mechanical Ventilation   [  ]Non-invasive ventilatory support   [ ]High-Flow  [ ]Acute  [ ]Chronic   [ ]Hypoxic  [ ]Hypercarbic   [ ]Other    [ ]Other organ failure     LABS: REVIEWED  CBC:                        10.9   8.90  )-----------( 167      ( 27 Jul 2021 07:53 )             35.7     CMP:    07-27    143  |  111<H>  |  18  ----------------------------<  78  3.6   |  20<L>  |  0.80    Ca    9.8      27 Jul 2021 07:53  Phos  3.0     07-27  Mg     2.2     07-27    TPro  5.4<L>  /  Alb  2.6<L>  /  TBili  0.3  /  DBili  x   /  AST  19  /  ALT  8<L>  /  AlkPhos  71  07-27      IMAGING: REVIEWED    Decision maker: The patient is UNable to participate in complex medical decision making conversations.   Legal surrogate: adult daughters, [place] and Karen     ADVANCE DIRECTIVES & GOALS OF CARE  - Full Code  - readdressed with daughters today  - explored understanding of hospice   - Palliative care info/support provided	          PSYCHOSOCIAL-SPIRITUAL ASSESSMENT: Reviewed, Care plan unchanged    REFERRALS:  [ ] palliative social work [ ]Chaplaincy  [ ]Hospice  [x ]Child Life  [ ]Social Work  [ ]Case management [  ]Holistic Therapy

## 2021-07-27 NOTE — PROGRESS NOTE ADULT - PROBLEM SELECTOR PLAN 4
On outpatient fentanyl and oxycodone. Hypotension currently limited ability to restart home regimen but will at a lower dose once her BP allows for it, monitor for withdrawal symptoms in the meantime.  - c/w gabapentin 100mg PO TID   - monitor for opioid w/d  - transition PO oxycodone to morphine 2mg IV q4h prn for now  - palliative consulted, appreciate recs Pt w/ metastatic melanoma with metastases at brain, spine, multiple sites. Poor prognosis, will likely need palliative consult, GOC discussion. Obtain prior workup and treatment from [facility] [place] [place].  - Dr. Salas to call daughters/family to discuss pt's decline  - palliative consulted, appreciate recs  - f/u MRI L-spine w/wo IV contrast, eval for metastatic disease  - must remove vEEG leads prior to MRI

## 2021-07-27 NOTE — PROGRESS NOTE ADULT - PROBLEM SELECTOR PLAN 5
mets to brain, ?spine, lung, soft tissue of neck, ?new LMD   -follows w Dr. Salas at [facility]  - sp XRT to ?spine, receiving WBRT, S/P craniotomies (6/2020, 7/2021)  - primary team to obtain collateral from [facility], [facility]  - MRI pending   - ECOG 4  -prognosis weeks to months

## 2021-07-27 NOTE — PROGRESS NOTE ADULT - PROBLEM SELECTOR PLAN 1
poorly controlled acute on chronic lower back pain 2/2 ?spine mets. HA 2/2 brain mets. suspect encephalopathy multifactorial, less likely opioid related as pt is not opioid naive.   -recommend starting fentanyl 12 mcg/hr q72 patch  -recommend ms 4 mg IV q4 PRN severe pain  -cw home gabapentin 100 mg PO BID  -hold steroids iso infection   -consider further imaging MRI to fu spine mets, brain mets  -recommend premedicating MS 4 mg IV x1 prior to MRI  -bisacodyl supp PRN no BM q3D.

## 2021-07-27 NOTE — PROGRESS NOTE ADULT - ASSESSMENT
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 Stroke work-up was negative for acute weakness, and there were no seizures recorded on EEG monitoring. Nevertheless, given patient's personal hx of seizures, and risks will continue diagnostic tests, and continue anti-epileptic drug.     Also, there are concerns for progressive AMS that could be potentially be multi-factorial due to infectious process of unknown source (febrile on 7/26 to 104F) and/or VCV encephalitis  and/or worsening disease process (metastatic lesion).        Recommendations:  - Continue vEEG monitoring  - Continue Keppra 1000mg Q12hrs  - Continue Depakote 500mg Q12hrs (started at [facility])   - Please obtain MRI Brain with contrast to evaluate progress of brain mets.  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 Stroke work-up was negative for acute weakness, and there were no seizures recorded on EEG monitoring. Nevertheless, given patient's personal hx of seizures, and risks will continue diagnostic tests, and continue anti-epileptic drug.     Also, there are concerns for progressive AMS that could be potentially be multi-factorial due to infectious process of unknown source (febrile on 7/26 to 104F) and/or VCV encephalitis  and/or worsening disease process (metastatic lesion).        Recommendations:  - Continue vEEG monitoring  - Continue Keppra 1000mg Q12hrs  - Increase Depakote from 500mg Q12hrs to 750mg Q12hrs (subtherapeutic level 5.9)  - Please obtain valproic acid level on 7/29 AM  - Please obtain MRI Brain with contrast to evaluate progress of brain mets.  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 Stroke work-up was negative for acute weakness, and there were no seizures recorded on EEG monitoring. Nevertheless, given patient's personal hx of seizures, and risks will continue diagnostic tests, and continue anti-epileptic drug.     Also, there are concerns for progressive AMS that could be potentially be multi-factorial due to infectious process of unknown source (febrile on 7/26 to 104F) and/or VZV encephalitis  and/or worsening disease process (metastatic lesion).        Recommendations:  - Continue vEEG monitoring  - Continue Keppra 1000mg Q12hrs  - Increase Depakote from 500mg Q12hrs to 750mg Q12hrs (subtherapeutic level 5.9)  - Please obtain valproic acid level on 7/29 AM  - Please obtain MRI Brain with contrast to evaluate progress of brain mets.  - Consider LP after MRI (best following MRI to prevent confusion of potential dural enhancement)  - Maintain seizure and fall precautions

## 2021-07-27 NOTE — PROGRESS NOTE ADULT - PROBLEM SELECTOR PLAN 8
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

## 2021-07-27 NOTE — PROGRESS NOTE ADULT - PROBLEM SELECTOR PLAN 2
Associated with left side weakness and left side facial droop on admission in the setting of metastasis to brain 2/2 melanoma; less likely metabolic encephalopathy VSS. Mild Leucocytosis around 11. CT brain negative for acute hemorrhage or infarction.  - q4hr general neurology checks and vitals  - f/u MRI Brain with contrast to evaluate for brain mets  - c/w keppra 1000mg IV q12h  - c/w Valproic acid 500mg IV q12h  - f/u vEEG  - discussed with On call epileptologist  - Neuro consulted, f/u recs  - STAT CTH if change in mentation     ADDENDUM: obtain [facility] RVP swab positive for coronavirus (non-SARS, non-COVID-19)  - supportive care  - wean supplemental O2 as able

## 2021-07-27 NOTE — PROGRESS NOTE ADULT - PROBLEM SELECTOR PLAN 4
2/2 known brain mets, cb recent WBRT, shingles related encephalitis, CT Head c/f mild LMD enhancement   -goc: continue comprehensive workup, MRI, LP  -Risk for delirium while admitted, recommend frequent reorientation  -appreciate ID, neuro recs

## 2021-07-27 NOTE — PROGRESS NOTE ADULT - SUBJECTIVE AND OBJECTIVE BOX
EPILEPSY PROGRESS NOTE:  Subjective:  Patient seen and examined in bed, and was minimally conversant. Patient report that she was in a lot of pain, and difficult to talk, and bedside nurse will administer pain medication shortly. No report of seizure events overnight. Patient was started on acyclovir on 7/26 as per ID.     Subtherapeutic valproic acid level 5.9    REVIEW OF SYSTEMS:  Unable to obtain 2/2 AMS    MEDICATIONS  (STANDING):  acyclovir IVPB 800 milliGRAM(s) IV Intermittent every 12 hours  gabapentin 100 milliGRAM(s) Oral three times a day  levETIRAcetam  IVPB 1000 milliGRAM(s) IV Intermittent every 12 hours  melatonin 3 milliGRAM(s) Oral at bedtime  meropenem  IVPB 2000 milliGRAM(s) IV Intermittent every 12 hours  midodrine 10 milliGRAM(s) Oral every 8 hours  mirtazapine 15 milliGRAM(s) Oral at bedtime  senna 1 Tablet(s) Oral at bedtime  valproate sodium IVPB 500 milliGRAM(s) IV Intermittent every 12 hours    MEDICATIONS  (PRN):  acetaminophen   Tablet .. 650 milliGRAM(s) Oral every 6 hours PRN Temp greater or equal to 38.5C (101.3F), Mild Pain (1 - 3)  hydrocortisone 2.5% Ointment 1 Application(s) Topical three times a day PRN Itching    VITAL SIGNS:  T(C): 37.1 (07-27-21 @ 04:43), Max: 39.4 (07-26-21 @ 11:46)  HR: 112 (07-27-21 @ 04:43) (102 - 119)  BP: 108/72 (07-27-21 @ 04:43) (83/55 - 108/72)  RR: 18 (07-27-21 @ 04:43) (16 - 18)  SpO2: 100% (07-27-21 @ 04:43) (96% - 100%)  Wt(kg): --    PHYSICAL EXAM:  Sleeping but easily arousable to verbal stimuli, and oriented to name only. Minimally conversant but appropriate with no obvious dysarthria or aphasia. Follows some simple commands such as lifting arms, and wiggling toes.   PERRLA 3mm brisk, EOMI, no nystagmus. No facial weakness appreciated  B/L LE 3+ pitting edema w RLE erythema and warm to touch  Upper extremities moves to command, at least 3/5  Bilateral LE moves minimally w/ gross toe wiggle  1+ B/L UE, absent in LE and toes mute    LABS:  CBC Full  -  ( 27 Jul 2021 07:53 )  WBC Count : 8.90 K/uL  RBC Count : 3.58 M/uL  Hemoglobin : 10.9 g/dL  Hematocrit : 35.7 %  Platelet Count - Automated : 167 K/uL  Mean Cell Volume : 99.7 fl  Mean Cell Hemoglobin : 30.4 pg  Mean Cell Hemoglobin Concentration : 30.5 gm/dL  Auto Neutrophil # : 7.53 K/uL  Auto Lymphocyte # : 0.61 K/uL  Auto Monocyte # : 0.44 K/uL  Auto Eosinophil # : 0.16 K/uL  Auto Basophil # : 0.04 K/uL  Auto Neutrophil % : 84.7 %  Auto Lymphocyte % : 6.9 %  Auto Monocyte % : 4.9 %  Auto Eosinophil % : 1.8 %  Auto Basophil % : 0.4 %    07-27    143  |  111<H>  |  18  ----------------------------<  78  3.6   |  20<L>  |  0.80    Ca    9.8      27 Jul 2021 07:53  Phos  3.0     07-27  Mg     2.2     07-27    TPro  5.4<L>  /  Alb  2.6<L>  /  TBili  0.3  /  DBili  x   /  AST  19  /  ALT  8<L>  /  AlkPhos  71  07-27    LIVER FUNCTIONS - ( 27 Jul 2021 07:53 )  Alb: 2.6 g/dL / Pro: 5.4 g/dL / ALK PHOS: 71 U/L / ALT: 8 U/L / AST: 19 U/L / GGT: x

## 2021-07-27 NOTE — PROGRESS NOTE ADULT - PROBLEM SELECTOR PLAN 9
Pt with known HTN, home meds include Amlodipine 5mg PO, labetalol 100mg po BID. Currently low BP in setting of sepsis.    - HOLD amlodipine 5mg PO daily  - HOLD labetalol 100mg po BID

## 2021-07-27 NOTE — PROGRESS NOTE ADULT - PROBLEM SELECTOR PLAN 1
PAIN SCALE 8 OF 10. Pt found to meet criteria for severe sepsis following admission with unknown source of infection. T 102.7, , BP 78/62, WBC 16.4, lactate 2.6. Possibly HCAP vs. post-obstructive PNA vs. central fevers 2/2 high tumor burden.    #Sepsis - source remains unclear; MRSA swab negative;  - c/w Malena for now  - ID consult to assist (would risk stratify possibility of encephalitis given recent aforementioned infection, ?LP)  - need RVP  - receiving IVF bolus this AM for hypotension  - low threshold to call ICU triage as still hypotensive and febrile Pt found to meet criteria for severe sepsis following admission with unknown source of infection. T 102.7, , BP 78/62, WBC 16.4, lactate 2.6. Possibly HCAP vs. post-obstructive PNA vs. central fevers 2/2 high tumor burden. Type 2 NSTEMI, trops downtrending.    #Sepsis - source remains unclear; MRSA swab negative;  - low threshold to call ICU triage as still hypotensive and febrile  - stop meropenem 2g q8hrs 2/2 interaction w/ valproic acid (inc seizure risk) and l/s for community acquired bacterial meningitis (per ID recs)  - obtain LP following MRI brain/spine

## 2021-07-27 NOTE — PROGRESS NOTE ADULT - ASSESSMENT
75y old Female with PMHx of Melanoma with mets to brain, spine  and multiple organs, HTN, PE (on Enoxaparin), seizure on Keppra secondary to brain mets, S/P craniotomy x2(June 2020, July 2021), shingles (recently treated at [facility]), chronic back pain 2/2 mets who presented to the ED brought in by her daughter with concerns of left side weakness, altered mental status and left side facial droop. Hospital course c/b high fevers tmax 103 tachycardia and hypotension resolved after fluid boluses. Patient empirically started on meropenem. Bcx NGTD, Ucx with E faecium however patient without urinary symptoms. EEG with epileptiform potential in R frontal lobe without epileptiform activity. ID consulted for fevers and r/o VZV encephalitis given patient's recent hx of shingles.    Recommendations:   - please perform a lumbar puncture and send of CSF cell count, protein, glucose, CSF PCR, CSF culture   - increase meropenem to 2g q8h (CNS dosing)  - given recent hx of shingles, can start acyclovir 800mg q8h  - IV hydration while on acyclovir 75y old Female with PMHx of Melanoma with mets to brain, spine  and multiple organs, HTN, PE (on Enoxaparin), seizure on Keppra secondary to brain mets, S/P craniotomy x2(June 2020, July 2021), shingles (recently treated at [facility]), chronic back pain 2/2 mets who presented to the ED brought in by her daughter with concerns of left side weakness, altered mental status and left side facial droop. Hospital course c/b high fevers tmax 103 tachycardia and hypotension resolved after fluid boluses. Patient empirically started on meropenem. Bcx NGTD, Ucx with E faecium however patient without urinary symptoms. EEG with epileptiform potential in R frontal lobe without epileptiform activity. 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 Plan for LP this morning. Resolution of leukocytosis and ANC downtrending (7.53<11.60), remains afebrile since11:46am yesterday (Tmax 103F).     Recommendations:   - Continue meropenem 2g q8hrs (CNS dosing)  - Continue acyclovir 800mg q8hrs. 2/2 recent hx of shingles & c/f VZV encephalitis. Maintain IV hydration while on acyclovir.   - F/u LP results: CSF cell count, protein, glucose, PCR, and cx's  - F/u UCx susceptibilities  - Pending MRI head to r/o residual/recurrent malignancy (hx melanoma). F/u results when obtained    ID team 1 following 75y old Female with PMHx of Melanoma with mets to brain, spine  and multiple organs, HTN, PE (on Enoxaparin), seizure on Keppra secondary to brain mets, S/P craniotomy x2(June 2020, July 2021), shingles (recently treated at [facility]), chronic back pain 2/2 mets who presented to the ED brought in by her daughter with concerns of left side weakness, altered mental status and left side facial droop. Hospital course c/b high fevers tmax 103 tachycardia and hypotension resolved after fluid boluses. Patient empirically started on meropenem. Bcx NGTD, Ucx with E faecium however patient without urinary symptoms. EEG with epileptiform potential in R frontal lobe without epileptiform activity. 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 Plan for LP this morning. Resolution of leukocytosis and ANC downtrending (7.53<11.60), remains afebrile since11:46am yesterday (Tmax 103F).     Recommendations:   - Stop meropenem 2g q8hrs 2/2 interaction w/ valproic acid (inc seizure risk) and low suspicion for community acquired bacterial meningitis  - Continue acyclovir 800mg q8hrs. 2/2 recent hx of shingles & c/f VZV encephalitis. Maintain IV hydration while on acyclovir.   - Obtain LP: CSF cell count, protein, glucose, CSF PCR, CSF cx, AFB cx, cytology, and fungal cx (please check all of these). F/u results  - F/u UCx susceptibilities  - Pending MRI head to r/o residual/recurrent malignancy (hx melanoma). F/u results when obtained    ID team 1 following

## 2021-07-27 NOTE — PROGRESS NOTE ADULT - PROBLEM SELECTOR PLAN 5
on vEEG   - c/w Keppra 1000mg IV q12h  - c/w valproate 500mg IV q12h On outpatient fentanyl and oxycodone. Hypotension currently limited ability to restart home regimen but will at a lower dose once her BP allows for it, monitor for withdrawal symptoms in the meantime.  - c/w gabapentin 100mg PO TID   - monitor for opioid w/d  - transition PO oxycodone to morphine 2mg IV q4h prn for now  - palliative consulted, appreciate recs  - lidocaine 5% patch q24h for neck pain  - fentanyl 12mcg/hr q72h for cancer related back pain

## 2021-07-28 DIAGNOSIS — A41.9 SEPSIS, UNSPECIFIED ORGANISM: ICD-10-CM

## 2021-07-28 LAB
ALBUMIN SERPL ELPH-MCNC: 1947 MG/DL — LOW (ref 3500–5200)
ALBUMIN SERPL ELPH-MCNC: 2.1 G/DL — LOW (ref 3.3–5)
ALP SERPL-CCNC: 66 U/L — SIGNIFICANT CHANGE UP (ref 40–120)
ALT FLD-CCNC: 7 U/L — LOW (ref 10–45)
ANION GAP SERPL CALC-SCNC: 9 MMOL/L — SIGNIFICANT CHANGE UP (ref 5–17)
APPEARANCE CSF: CLEAR — SIGNIFICANT CHANGE UP
APPEARANCE SPUN FLD: COLORLESS — SIGNIFICANT CHANGE UP
AST SERPL-CCNC: 16 U/L — SIGNIFICANT CHANGE UP (ref 10–40)
BASOPHILS # BLD AUTO: 0.02 K/UL — SIGNIFICANT CHANGE UP (ref 0–0.2)
BASOPHILS NFR BLD AUTO: 0.2 % — SIGNIFICANT CHANGE UP (ref 0–2)
BILIRUB SERPL-MCNC: 0.3 MG/DL — SIGNIFICANT CHANGE UP (ref 0.2–1.2)
BUN SERPL-MCNC: 14 MG/DL — SIGNIFICANT CHANGE UP (ref 7–23)
CALCIUM SERPL-MCNC: 9.8 MG/DL — SIGNIFICANT CHANGE UP (ref 8.4–10.5)
CHLORIDE SERPL-SCNC: 111 MMOL/L — HIGH (ref 96–108)
CO2 SERPL-SCNC: 18 MMOL/L — LOW (ref 22–31)
COLOR CSF: SIGNIFICANT CHANGE UP
CREAT SERPL-MCNC: 0.72 MG/DL — SIGNIFICANT CHANGE UP (ref 0.5–1.3)
CSF PCR RESULT: SIGNIFICANT CHANGE UP
EOSINOPHIL # BLD AUTO: 0.16 K/UL — SIGNIFICANT CHANGE UP (ref 0–0.5)
EOSINOPHIL NFR BLD AUTO: 2 % — SIGNIFICANT CHANGE UP (ref 0–6)
GLUCOSE CSF-MCNC: 52 MG/DL — SIGNIFICANT CHANGE UP (ref 40–70)
GLUCOSE SERPL-MCNC: 95 MG/DL — SIGNIFICANT CHANGE UP (ref 70–99)
GRAM STN FLD: SIGNIFICANT CHANGE UP
HCT VFR BLD CALC: 34.6 % — SIGNIFICANT CHANGE UP (ref 34.5–45)
HGB BLD-MCNC: 10.4 G/DL — LOW (ref 11.5–15.5)
IGG FLD-MCNC: 452 MG/DL — LOW (ref 610–1660)
IGG/ALB SER: 0.23 RATIO — SIGNIFICANT CHANGE UP
IMM GRANULOCYTES NFR BLD AUTO: 1.4 % — SIGNIFICANT CHANGE UP (ref 0–1.5)
LABORATORY COMMENT REPORT: SIGNIFICANT CHANGE UP
LEVETIRACETAM SERPL-MCNC: 74.6 UG/ML — HIGH (ref 10–40)
LYMPHOCYTES # BLD AUTO: 1.07 K/UL — SIGNIFICANT CHANGE UP (ref 1–3.3)
LYMPHOCYTES # BLD AUTO: 13.3 % — SIGNIFICANT CHANGE UP (ref 13–44)
LYMPHOCYTES # CSF: 1 % — LOW (ref 40–80)
MAGNESIUM SERPL-MCNC: 2.2 MG/DL — SIGNIFICANT CHANGE UP (ref 1.6–2.6)
MCHC RBC-ENTMCNC: 29.9 PG — SIGNIFICANT CHANGE UP (ref 27–34)
MCHC RBC-ENTMCNC: 30.1 GM/DL — LOW (ref 32–36)
MCV RBC AUTO: 99.4 FL — SIGNIFICANT CHANGE UP (ref 80–100)
MONOCYTES # BLD AUTO: 0.59 K/UL — SIGNIFICANT CHANGE UP (ref 0–0.9)
MONOCYTES NFR BLD AUTO: 7.3 % — SIGNIFICANT CHANGE UP (ref 2–14)
NEUTROPHILS # BLD AUTO: 6.12 K/UL — SIGNIFICANT CHANGE UP (ref 1.8–7.4)
NEUTROPHILS # CSF: 1 % — SIGNIFICANT CHANGE UP (ref 0–6)
NEUTROPHILS NFR BLD AUTO: 75.8 % — SIGNIFICANT CHANGE UP (ref 43–77)
NIGHT BLUE STAIN TISS: SIGNIFICANT CHANGE UP
NRBC # BLD: 0 /100 WBCS — SIGNIFICANT CHANGE UP (ref 0–0)
NRBC NFR CSF: 2 /UL — SIGNIFICANT CHANGE UP (ref 0–5)
PHOSPHATE SERPL-MCNC: 2.9 MG/DL — SIGNIFICANT CHANGE UP (ref 2.5–4.5)
PLATELET # BLD AUTO: 171 K/UL — SIGNIFICANT CHANGE UP (ref 150–400)
POTASSIUM SERPL-MCNC: 3.6 MMOL/L — SIGNIFICANT CHANGE UP (ref 3.5–5.3)
POTASSIUM SERPL-SCNC: 3.6 MMOL/L — SIGNIFICANT CHANGE UP (ref 3.5–5.3)
PROT CSF-MCNC: 42 MG/DL — SIGNIFICANT CHANGE UP (ref 15–45)
PROT SERPL-MCNC: 5.4 G/DL — LOW (ref 6–8.3)
RBC # BLD: 3.48 M/UL — LOW (ref 3.8–5.2)
RBC # CSF: 8 /UL — HIGH (ref 0–0)
RBC # FLD: 14.2 % — SIGNIFICANT CHANGE UP (ref 10.3–14.5)
SODIUM SERPL-SCNC: 138 MMOL/L — SIGNIFICANT CHANGE UP (ref 135–145)
SOURCE HSV 1/2: SIGNIFICANT CHANGE UP
SPECIMEN SOURCE: SIGNIFICANT CHANGE UP
SPECIMEN SOURCE: SIGNIFICANT CHANGE UP
TUBE TYPE: SIGNIFICANT CHANGE UP
VALPROATE SERPL-MCNC: 6.8 UG/ML — LOW (ref 50–100)
VZV IGM SER-ACNC: <0.91 INDEX — SIGNIFICANT CHANGE UP (ref 0–0.9)
WBC # BLD: 8.07 K/UL — SIGNIFICANT CHANGE UP (ref 3.8–10.5)
WBC # FLD AUTO: 8.07 K/UL — SIGNIFICANT CHANGE UP (ref 3.8–10.5)

## 2021-07-28 PROCEDURE — 99232 SBSQ HOSP IP/OBS MODERATE 35: CPT | Mod: GC

## 2021-07-28 PROCEDURE — 99233 SBSQ HOSP IP/OBS HIGH 50: CPT

## 2021-07-28 PROCEDURE — 88108 CYTOPATH CONCENTRATE TECH: CPT | Mod: 26

## 2021-07-28 PROCEDURE — 62328 DX LMBR SPI PNXR W/FLUOR/CT: CPT

## 2021-07-28 PROCEDURE — 99233 SBSQ HOSP IP/OBS HIGH 50: CPT | Mod: GC

## 2021-07-28 RX ORDER — SODIUM CHLORIDE 9 MG/ML
500 INJECTION INTRAMUSCULAR; INTRAVENOUS; SUBCUTANEOUS ONCE
Refills: 0 | Status: COMPLETED | OUTPATIENT
Start: 2021-07-28 | End: 2021-07-28

## 2021-07-28 RX ORDER — MORPHINE SULFATE 50 MG/1
4 CAPSULE, EXTENDED RELEASE ORAL EVERY 4 HOURS
Refills: 0 | Status: DISCONTINUED | OUTPATIENT
Start: 2021-07-28 | End: 2021-08-01

## 2021-07-28 RX ORDER — SODIUM CHLORIDE 9 MG/ML
1000 INJECTION, SOLUTION INTRAVENOUS
Refills: 0 | Status: DISCONTINUED | OUTPATIENT
Start: 2021-07-28 | End: 2021-07-29

## 2021-07-28 RX ADMIN — Medication 266 MILLIGRAM(S): at 06:49

## 2021-07-28 RX ADMIN — SODIUM CHLORIDE 500 MILLILITER(S): 9 INJECTION INTRAMUSCULAR; INTRAVENOUS; SUBCUTANEOUS at 14:34

## 2021-07-28 RX ADMIN — MIDODRINE HYDROCHLORIDE 10 MILLIGRAM(S): 2.5 TABLET ORAL at 02:39

## 2021-07-28 RX ADMIN — Medication 28.75 MILLIGRAM(S): at 06:59

## 2021-07-28 RX ADMIN — SODIUM CHLORIDE 50 MILLILITER(S): 9 INJECTION, SOLUTION INTRAVENOUS at 22:00

## 2021-07-28 RX ADMIN — FENTANYL CITRATE 1 PATCH: 50 INJECTION INTRAVENOUS at 18:02

## 2021-07-28 RX ADMIN — LEVETIRACETAM 400 MILLIGRAM(S): 250 TABLET, FILM COATED ORAL at 17:37

## 2021-07-28 RX ADMIN — Medication 28.75 MILLIGRAM(S): at 17:58

## 2021-07-28 RX ADMIN — GABAPENTIN 100 MILLIGRAM(S): 400 CAPSULE ORAL at 06:43

## 2021-07-28 RX ADMIN — FENTANYL CITRATE 1 PATCH: 50 INJECTION INTRAVENOUS at 07:07

## 2021-07-28 RX ADMIN — LEVETIRACETAM 400 MILLIGRAM(S): 250 TABLET, FILM COATED ORAL at 06:43

## 2021-07-28 NOTE — PROGRESS NOTE ADULT - PROBLEM SELECTOR PLAN 3
Pt w/ metastatic melanoma with metastases at brain/spine/multiple organs. Chronic back pain 2/2 spinal mets. Poor prognosis.   - Dr. Salas open to being involved in discussion with GOC. Obtain prior workup and treatment from [facility] [place].  - palliative consulted  - c/w fentanyl patch  - c/w morphine 4mg IV q4 PRN for severe pain  -cw home gabapentin 100 mg PO BID  - Brain MRI: poor quality, no signs of additional mets.   - Spine MRI: 1.0 cm intradural mass at T12, may be metastases. Additional bony metastases of L1 and multiple intramuscular mets within the lumbar spine.

## 2021-07-28 NOTE — PROGRESS NOTE ADULT - PROBLEM SELECTOR PLAN 9
Pt w/ hx of HTN. Home meds: amlodipine 5mg PO, labetalol 100mg PO BID  - holding home meds in setting of sepsis  - c/w midodrine 10mg q8h, try to wean when possible

## 2021-07-28 NOTE — PROGRESS NOTE ADULT - PROBLEM SELECTOR PLAN 1
Pt presented with AMS and reported left sided weakness. CT brain negative for acute bleed or infarct. Current mental status is not improved since admission. More likely due to brain metastasis, less likely infection v metabolic encephalopathy.  - q4hr general neurology checks and vitals  - neuro epilepsy following: vEEG no seizures recorded  - LP 7/28: no obvious sign of infection (glu 52, protein 42, lymphocyte 1%)  - f/u CSF studies  - ID following: rec d/c acyclovir   - Brain MRI: poor quality due to motion. Post surgical and radiation changes of right temporal lobe. No large mass, no recent infarct.  - c/w keppra 1g IV q12h  - c/w Valproic acid 750mg IV q12h  - f/u depakote serum level in AM

## 2021-07-28 NOTE — PROGRESS NOTE ADULT - ATTENDING COMMENTS
Poor mental status and increased pain, with evidence of intramuscular lesions consistent with metastatic foci, which may explain worsening pain, though unclear if this is actually worse / new compared to prior.    To improve mental status, can consider holding mirtazepine - can double check levetiracetam dosing, and consider dropping dose now that depakote as second agent (though level is still low).    LP baseline results WBC and protein normal - encephalitis PCR negative:  no obvious infection.  Does not rule out VZV encephalitis, carlos partially treated with continued acyclovir.    Plan:  1) will defer to ID, but would continue acyclovir, await IgG/IgM in CSF  2) can consider dexamethasone 2mg bid, as per prior admissions which may help pain and potentially improve minimal edema in the CNS, if ID agreeable.

## 2021-07-28 NOTE — PROGRESS NOTE ADULT - PROBLEM SELECTOR PLAN 6
Pt w/ hx of seizures.   - Neuro epilepsy following  - vEEG no evidence of seizures  - c/w keppra 1g IV q12h  - c/w valproate 500mg IV q12h

## 2021-07-28 NOTE — PROGRESS NOTE ADULT - PROBLEM SELECTOR PLAN 5
Pt met criteria for severe sepsis following admission with unknown source of infection. WBC normal now. MRSA swab neg. Afebrile since 7/26. Persistently tachycardic, no SOB, no obvious CP. EKG sinus tachycardia.  - meropenem discontinued due to interaction w/ valproic acid (inc seizure risk) and l/s for community acquired bacterial meningitis (per ID recs)  - acyclovir discontinued as LP not indicative of infection  - LP results not indicative of infection so far, f/u other CSF studies  - ID following

## 2021-07-28 NOTE — PROGRESS NOTE ADULT - ATTENDING COMMENTS
Patient was seen and examined with the resident team today.  I agree with Dr. Herrera's assessment and plan with the following exceptions/additions:     Briefly, this is a 76yo woman with a PMH of stage 4 melanoma c/b [place] [place] s/p craniotomy, PE (on Lovenox), HTN and recent [facility]-admission for herpes ophthalmicus who presented from [facility] [place] with AMS and L-sided neuro deficits, found to have metabolic encephalopathy 2/2 sepsis of unclear source, along with a type 2 NSTEMI.  Of note, stroke work-up was negative.  RVP subsequently +non-COVID 19 Coronavirus; however, still ruling-out for VZV encephalitis given her profound lethargy and deviation from her baseline.      #Metabolic Encephalopathy - infection, cancer and seizure work-up per below  #Sepsis - potentially due to viral PNA; c/w Acyclovir and f/u LP CSF studies from today   #Coronavirus PNA - supportive care; wean supplemental O2 as able   #Metastatic Melanoma w/Brain, Spine and Additional Mets - [place] [facility] [place] left message with his  to review our MRI findings  #Cancer-related pain - Fentanyl and Oxycodone per iSTOP; c/w lower dose of Fentanyl patch and Morphine 2mg IV q4h PRN  #Hx of Seizures - s/p vEEG w/o seizures; c/w Keppra and VPA; VPA level tomorrow AM  #HTN - on BB and Amlodipine per med rec, hold all BP meds indefinitely given she's been very hypotensive here and actually requiring Midodrine; additional IVF bolus today   #Hx of PE - c/w therapeutic Lovenox BID   #Recent Herpes Ophthalmicus - c/w gabapentin; ID work-up as per above   #Type 2 NSTEMI - trops downtrending; mgmt of sepsis as per above  #ELISABETH - baseline unclear but did come in with a Cr of 1.08 and now 0.72; will continue to trend Cr  #Bacturia - E. faecium in urine but U/A not consistent with UTI; pt unable to report ROS; will hold off on treating and monitor   #DVT PPx - Lovenox as per above  #Dispo - TBD     Deena Roldan  [phone]

## 2021-07-28 NOTE — CONSULT NOTE ADULT - SUBJECTIVE AND OBJECTIVE BOX
74 y/o Fwith PMHx of Melanoma with mets to brain, spine and multiple organs, HTN, PE (on Lovenox), seizure secondary to brain mets (on Keppra), S/P craniotomy x2 (June 2020, July 2021), recent shingles (treated at [facility]), chronic back pain 2/2 mets, brought into to the ED by her daughter with concerns of left side weakness, AMS and left side facial droop.  Patient is being admitted for monitoring and further management.  IR consulted for lumbar puncture to r/o VZV.     Clinical History: WEAKNESSMETASTATIC MELANOMA    No pertinent family history in first degree relatives    Handoff    MEWS Score    Hypertension    Pulmonary embolism    Melanoma    Shingles    Chronic back pain    Seizures    Weakness    Pulmonary thromboembolism    Altered mental status    Seizures    Pulmonary embolism    Chronic back pain    Hypertension    ELISABETH (acute kidney injury)    Nutrition, metabolism, and development symptoms    Insomnia    Anxiety    Metastatic melanoma    Neoplasm related pain    Functional quadriplegia    Encephalopathy due to structural disorder of brain    Goals of care, counseling/discussion    Full code status    Encounter for palliative care    Cancer related pain    History of seizures    History of pulmonary embolism    Herpes zoster ophthalmicus    NSTEMI (non-ST elevated myocardial infarction)    Coronavirus infection    H/O craniotomy    STROKE SYPTOMS    Severe sepsis    Metastatic melanoma    [email or web address]        Meds:acetaminophen   Tablet .. 650 milliGRAM(s) Oral every 6 hours PRN  acyclovir IVPB 800 milliGRAM(s) IV Intermittent every 12 hours  fentaNYL   Patch  12 MICROgram(s)/Hr 1 Patch Transdermal every 72 hours  gabapentin 100 milliGRAM(s) Oral three times a day  hydrocortisone 2.5% Ointment 1 Application(s) Topical three times a day PRN  levETIRAcetam  IVPB 1000 milliGRAM(s) IV Intermittent every 12 hours  lidocaine   5% Patch 1 Patch Transdermal every 24 hours  melatonin 3 milliGRAM(s) Oral at bedtime  midodrine 10 milliGRAM(s) Oral every 8 hours  mirtazapine 15 milliGRAM(s) Oral at bedtime  morphine  - Injectable 2 milliGRAM(s) IV Push every 4 hours PRN  senna 1 Tablet(s) Oral at bedtime  valproate sodium IVPB 750 milliGRAM(s) IV Intermittent every 12 hours      Allergies:crawfish (Anaphylaxis)  Keflex (Anaphylaxis)  Kiwi (Anaphylaxis)  penicillin (Anaphylaxis)  tetracycline (Anaphylaxis)        Labs:                           10.4   8.07  )-----------( 171      ( 28 Jul 2021 06:06 )             34.6       07-28    138  |  111<H>  |  14  ----------------------------<  95  3.6   |  18<L>  |  0.72    Ca    9.8      28 Jul 2021 06:06  Phos  2.9     07-28  Mg     2.2     07-28    TPro  5.4<L>  /  Alb  2.1<L>  /  TBili  0.3  /  DBili  x   /  AST  16  /  ALT  7<L>  /  AlkPhos  66  07-28          Imaging Findings: MRI reviewed

## 2021-07-28 NOTE — PROGRESS NOTE ADULT - ATTENDING COMMENTS
Agree with above.  Continue Acyclovir.  Follow up LP results Agree with above.  LP results not consistent with viral encephalitis and VZV is negative.  Can stop acyclovir.    ID team 1 will sign off.

## 2021-07-28 NOTE — CONSULT NOTE ADULT - ASSESSMENT
Assessment: 74 y/o Fwith PMHx of Melanoma with mets to brain, spine and multiple organs, HTN, PE (on Lovenox), seizure secondary to brain mets (on Keppra), S/P craniotomy x2 (June 2020, July 2021), recent shingles (treated at [facility]), chronic back pain 2/2 mets, brought into to the ED by her daughter with concerns of left side weakness, AMS and left side facial droop.  Patient is being admitted for monitoring and further management.  IR consulted for lumbar puncture to r/o VZV.  Case reviewed with Dr. Peck, plan for procedure with sedation.       Recommendations: NPO at midnight prior to procedure with sedation. Lovenox held.      Communicated with: primary team

## 2021-07-28 NOTE — PROGRESS NOTE ADULT - ASSESSMENT
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 Stroke work-up was negative for acute weakness, and there were no seizures recorded on EEG monitoring.      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     Recommendations:  - Continue Keppra 1000mg Q12hrs  - Continue Depakote 750mg Q12hrs   - Please obtain valproic acid level on 7/29 AM  - LP as per ID recs   - Maintain seizure and fall precautions

## 2021-07-28 NOTE — PROGRESS NOTE ADULT - PROBLEM SELECTOR PLAN 2
Pt with RVP swab that was positive for coronavirus (non-SARS, non-COVID-19). Pt with nonlabored breathing, O2 [id number].  - c/w supportive care

## 2021-07-28 NOTE — PROGRESS NOTE ADULT - ASSESSMENT
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  Patient is being admitted for monitoring and further management.  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  Patient is being admitted for monitoring and workup for reversible causes of AMS. More likely etiology is 2/2 brain metastasis, less likely infectious.

## 2021-07-28 NOTE — PROGRESS NOTE ADULT - SUBJECTIVE AND OBJECTIVE BOX
Infectious Diseases Progress Note:  **INCOMPLETE NOTE**  SUBJECTIVE: Patient seen and examined at bedside. Appears drowsy but well. Endorses increased pain (nonspecific) overnight and this morning. Denies fever, chills, HA, nausea, vomiting, abdominal pain, dysuria, diarrhea.    WEAKNESS METASTATIC MELANOMA      Pulmonary thromboembolism    Altered mental status    Seizures    Pulmonary embolism    Chronic back pain    Hypertension    ELISABETH (acute kidney injury)    Nutrition, metabolism, and development symptoms    Insomnia    Anxiety    Metastatic melanoma    Neoplasm related pain    Functional quadriplegia    Encephalopathy due to structural disorder of brain    Goals of care, counseling/discussion    Full code status    Encounter for palliative care    Cancer related pain    History of seizures    History of pulmonary embolism    Herpes zoster ophthalmicus    NSTEMI (non-ST elevated myocardial infarction)    Coronavirus infection      Allergies  crawfish (Anaphylaxis)  Keflex (Anaphylaxis)  Kiwi (Anaphylaxis)  penicillin (Anaphylaxis)  tetracycline (Anaphylaxis)    Intolerances    ANTIBIOTICS/RELEVANT:  antimicrobials  acyclovir IVPB 800 milliGRAM(s) IV Intermittent every 12 hours    immunologic:    OTHER:  acetaminophen   Tablet .. 650 milliGRAM(s) Oral every 6 hours PRN  fentaNYL   Patch  12 MICROgram(s)/Hr 1 Patch Transdermal every 72 hours  gabapentin 100 milliGRAM(s) Oral three times a day  hydrocortisone 2.5% Ointment 1 Application(s) Topical three times a day PRN  levETIRAcetam  IVPB 1000 milliGRAM(s) IV Intermittent every 12 hours  lidocaine   5% Patch 1 Patch Transdermal every 24 hours  melatonin 3 milliGRAM(s) Oral at bedtime  midodrine 10 milliGRAM(s) Oral every 8 hours  mirtazapine 15 milliGRAM(s) Oral at bedtime  morphine  - Injectable 2 milliGRAM(s) IV Push every 4 hours PRN  senna 1 Tablet(s) Oral at bedtime  valproate sodium IVPB 750 milliGRAM(s) IV Intermittent every 12 hours      Objective:  Vital Signs Last 24 Hrs  T(C): 36.7 (28 Jul 2021 05:27), Max: 37.4 (27 Jul 2021 23:20)  T(F): 98.1 (28 Jul 2021 05:27), Max: 99.3 (27 Jul 2021 23:20)  HR: 117 (28 Jul 2021 05:27) (108 - 117)  BP: 91/66 (28 Jul 2021 05:27) (90/57 - 107/70)  BP(mean): --  RR: 16 (28 Jul 2021 05:27) (16 - 18)  SpO2: 96% (28 Jul 2021 05:27) (96% - 100%)    PHYSICAL EXAM:  Constitutional: Well-developed, well nourished	  Respiratory: CTA bilateral  Cardiovascular: S1S2, RRR, no murmurs  Gastrointestinal: soft, NTND, (+) BS, no HSM  Extremities: no c/e/e  Skin: no rashes  Neuro: A&Ox3    LABS:                        10.4   8.07  )-----------( 171      ( 28 Jul 2021 06:06 )             34.6     07-28    138  |  111<H>  |  14  ----------------------------<  95  3.6   |  18<L>  |  0.72    Ca    9.8      28 Jul 2021 06:06  Phos  2.9     07-28  Mg     2.2     07-28    TPro  5.4<L>  /  Alb  2.1<L>  /  TBili  0.3  /  DBili  x   /  AST  16  /  ALT  7<L>  /  AlkPhos  66  07-28    MICROBIOLOGY:  Respiratory Viral Panel with COVID-19 by BEENA (07.26.21 @ 18:52)    Rapid RVP Result: Detected    SARS-CoV-2: NotDete: This Respiratory Panel uses polymerase chain reaction (PCR) to detect for  adenovirus; coronavirus (HKU1, NL63, 229E, OC43); human metapneumovirus  (hMPV); human enterovirus/rhinovirus (Entero/RV); influenza A; influenza  A/H1; influenza A/H3; influenza A/H1-2009; influenza B; parainfluenza  viruses 1, 2, 3, 4; respiratory syncytial virus; Mycoplasma pneumoniae;  Chlamydophila pneumoniae; and SARS-CoV-2.    Culture - Urine (07.25.21 @ 10:54)    Specimen Source: .Urine None    Culture Results:   >100,000 CFU/ml Enterococcus faecium  Susceptibility to follow.    Culture - Blood (07.25.21 @ 08:54)    Specimen Source: .Blood Blood-Peripheral    Culture Results:   No growth at 1 day.    RADIOLOGY & ADDITIONAL STUDIES:  < from: CT Chest w/ IV Cont (07.24.21 @ 17:17) >  1. There is pulmonary metastatic disease and there are metastases involving left supraclavicular and bilateral axillary lymph nodes, in addition to soft tissue metastases in the shoulder musculature bilaterally. Recommend PET/CT to try to determine the site of primary malignancy. Considerations include lung cancer, breast cancer, metastatic melanoma and renal cell carcinoma.  2. Low-density lesion right lobe of liver adjacent to infiltration of subcutaneous fat in the right lateral chest wall. Recommend clinical correlation to determine if there has been trauma to this region.  3. Multinodular thyroid.  4. Small right pleural effusion.    < from: CT Angio Head w/ IV Cont (07.24.21 @ 17:15) >  1. Postoperative changes in the right frontal lobe. Mild leptomeningeal enhancement may be present along the right frontal convexity. An MRI with contrast is recommended to exclude any underlying residual/recurrent malignancy.  2. No large vessel occlusion    < from: CT Brain Stroke Protocol (07.24.21 @ 17:11) >  No acute intracranial hemorrhage or transcortical infarction.  Status post right frontal craniotomy with subjacent right frontal lobe encephalomalacic changes. Infectious Diseases Progress Note:    SUBJECTIVE: Patient seen and examined at bedside. Appears drowsy but well. Endorses increased pain (nonspecific) overnight and this morning. Denies fever, chills, HA, nausea, vomiting, abdominal pain, dysuria, diarrhea.    WEAKNESS METASTATIC MELANOMA      Pulmonary thromboembolism    Altered mental status    Seizures    Pulmonary embolism    Chronic back pain    Hypertension    ELISABETH (acute kidney injury)    Nutrition, metabolism, and development symptoms    Insomnia    Anxiety    Metastatic melanoma    Neoplasm related pain    Functional quadriplegia    Encephalopathy due to structural disorder of brain    Goals of care, counseling/discussion    Full code status    Encounter for palliative care    Cancer related pain    History of seizures    History of pulmonary embolism    Herpes zoster ophthalmicus    NSTEMI (non-ST elevated myocardial infarction)    Coronavirus infection      Allergies  crawfish (Anaphylaxis)  Keflex (Anaphylaxis)  Kiwi (Anaphylaxis)  penicillin (Anaphylaxis)  tetracycline (Anaphylaxis)    Intolerances    ANTIBIOTICS/RELEVANT:  antimicrobials  acyclovir IVPB 800 milliGRAM(s) IV Intermittent every 12 hours    immunologic:    OTHER:  acetaminophen   Tablet .. 650 milliGRAM(s) Oral every 6 hours PRN  fentaNYL   Patch  12 MICROgram(s)/Hr 1 Patch Transdermal every 72 hours  gabapentin 100 milliGRAM(s) Oral three times a day  hydrocortisone 2.5% Ointment 1 Application(s) Topical three times a day PRN  levETIRAcetam  IVPB 1000 milliGRAM(s) IV Intermittent every 12 hours  lidocaine   5% Patch 1 Patch Transdermal every 24 hours  melatonin 3 milliGRAM(s) Oral at bedtime  midodrine 10 milliGRAM(s) Oral every 8 hours  mirtazapine 15 milliGRAM(s) Oral at bedtime  morphine  - Injectable 2 milliGRAM(s) IV Push every 4 hours PRN  senna 1 Tablet(s) Oral at bedtime  valproate sodium IVPB 750 milliGRAM(s) IV Intermittent every 12 hours      Objective:  Vital Signs Last 24 Hrs  T(C): 36.7 (28 Jul 2021 05:27), Max: 37.4 (27 Jul 2021 23:20)  T(F): 98.1 (28 Jul 2021 05:27), Max: 99.3 (27 Jul 2021 23:20)  HR: 117 (28 Jul 2021 05:27) (108 - 117)  BP: 91/66 (28 Jul 2021 05:27) (90/57 - 107/70)  BP(mean): --  RR: 16 (28 Jul 2021 05:27) (16 - 18)  SpO2: 96% (28 Jul 2021 05:27) (96% - 100%)    PHYSICAL EXAM:  Constitutional: Well-developed, well nourished	  Respiratory: CTA bilateral  Cardiovascular: S1S2, RRR, no murmurs  Gastrointestinal: soft, NTND, (+) BS, no HSM  Extremities: no c/e/e  Skin: no rashes  Neuro: A&Ox3    LABS:                        10.4   8.07  )-----------( 171      ( 28 Jul 2021 06:06 )             34.6     07-28    138  |  111<H>  |  14  ----------------------------<  95  3.6   |  18<L>  |  0.72    Ca    9.8      28 Jul 2021 06:06  Phos  2.9     07-28  Mg     2.2     07-28    TPro  5.4<L>  /  Alb  2.1<L>  /  TBili  0.3  /  DBili  x   /  AST  16  /  ALT  7<L>  /  AlkPhos  66  07-28    MICROBIOLOGY:  Respiratory Viral Panel with COVID-19 by BEENA (07.26.21 @ 18:52)    Rapid RVP Result: Detected    SARS-CoV-2: NotDete: This Respiratory Panel uses polymerase chain reaction (PCR) to detect for  adenovirus; coronavirus (HKU1, NL63, 229E, OC43); human metapneumovirus  (hMPV); human enterovirus/rhinovirus (Entero/RV); influenza A; influenza  A/H1; influenza A/H3; influenza A/H1-2009; influenza B; parainfluenza  viruses 1, 2, 3, 4; respiratory syncytial virus; Mycoplasma pneumoniae;  Chlamydophila pneumoniae; and SARS-CoV-2.    Culture - Urine (07.25.21 @ 10:54)    Specimen Source: .Urine None    Culture Results:   >100,000 CFU/ml Enterococcus faecium  Susceptibility to follow.    Culture - Blood (07.25.21 @ 08:54)    Specimen Source: .Blood Blood-Peripheral    Culture Results:   No growth at 1 day.    RADIOLOGY & ADDITIONAL STUDIES:  MR Lumbar Spine w/wo IV Cont (07.27.21 @ 23:25): Approximately 1.0 cm intradural mass situated at the T12 level which is incompletely evaluated and may represent metastases given patient's clinical history of melanoma. Additional bony metastases involving L1 and multiple intramuscular metastasis within the lumbar spine as detailed above.    MR Head w/wo IV Cont (07.27.21 @ 23:24): Severely motion degraded postcontrast study, essentially nondiagnostic for the evaluation of metastatic disease, and confidence severely limited without prior imaging for review. No large intracranial mass or mass effect. Postsurgical and likely post-RT change is present in the right frontal lobe with areas of hemosiderin and mostly gliosis with ex vacuo ventricular dilatation. No evidence of recent infarct.     CT Chest w/ IV Cont (07.24.21 @ 17:17) >  1. There is pulmonary metastatic disease and there are metastases involving left supraclavicular and bilateral axillary lymph nodes, in addition to soft tissue metastases in the shoulder musculature bilaterally. Recommend PET/CT to try to determine the site of primary malignancy. Considerations include lung cancer, breast cancer, metastatic melanoma and renal cell carcinoma.  2. Low-density lesion right lobe of liver adjacent to infiltration of subcutaneous fat in the right lateral chest wall. Recommend clinical correlation to determine if there has been trauma to this region.  3. Multinodular thyroid.  4. Small right pleural effusion.    CT Angio Head w/ IV Cont (07.24.21 @ 17:15)  1. Postoperative changes in the right frontal lobe. Mild leptomeningeal enhancement may be present along the right frontal convexity. An MRI with contrast is recommended to exclude any underlying residual/recurrent malignancy.  2. No large vessel occlusion    CT Brain Stroke Protocol (07.24.21 @ 17:11)  No acute intracranial hemorrhage or transcortical infarction.  Status post right frontal craniotomy with subjacent right frontal lobe encephalomalacic changes.

## 2021-07-28 NOTE — PROGRESS NOTE ADULT - PROBLEM SELECTOR PLAN 8
Pt w/ hx of herpes zoster infection. VZV encephalitis less likely now in setting of normal LP CSF.  - acyclovir discontinued

## 2021-07-28 NOTE — PROGRESS NOTE ADULT - SUBJECTIVE AND OBJECTIVE BOX
INTERVAL EVENTS: KELSEY. Brain MRI completed last night, awaiting radiology read. Pt made NPO this AM for LP today.    SUBJECTIVE / INTERVAL HPI: Patient seen this AM and examined at bedside. Pt mumbling and not answering all questions. Denies fevers, chills, nausea, pain.    ROS: negative unless otherwise stated above.    VITAL SIGNS:  Vital Signs Last 24 Hrs  T(C): 36.7 (28 Jul 2021 05:27), Max: 37.4 (27 Jul 2021 23:20)  T(F): 98.1 (28 Jul 2021 05:27), Max: 99.3 (27 Jul 2021 23:20)  HR: 117 (28 Jul 2021 05:27) (108 - 117)  BP: 91/66 (28 Jul 2021 05:27) (90/57 - 107/70)  BP(mean): --  RR: 16 (28 Jul 2021 05:27) (16 - 18)  SpO2: 96% (28 Jul 2021 05:27) (96% - 100%)      07-27-21 @ 07:01  -  07-28-21 @ 07:00  --------------------------------------------------------  IN: 0 mL / OUT: 400 mL / NET: -400 mL        PHYSICAL EXAM:    General: elderly woman, laying in bed, NAD, arousable to voice  HEENT: MMM, anicteric sclera  Neck: supple  Cardiovascular: +S1/S2; RRR; no M/R/G  Respiratory: CTA B/L; no W/R/R  Gastrointestinal: soft, NT/ND; normal BS  Extremities: WWP; no edema, clubbing or cyanosis  Vascular: 2+ radial, DP/PT pulses B/L      MEDICATIONS:  MEDICATIONS  (STANDING):  acyclovir IVPB 800 milliGRAM(s) IV Intermittent every 12 hours  fentaNYL   Patch  12 MICROgram(s)/Hr 1 Patch Transdermal every 72 hours  gabapentin 100 milliGRAM(s) Oral three times a day  levETIRAcetam  IVPB 1000 milliGRAM(s) IV Intermittent every 12 hours  lidocaine   5% Patch 1 Patch Transdermal every 24 hours  melatonin 3 milliGRAM(s) Oral at bedtime  midodrine 10 milliGRAM(s) Oral every 8 hours  mirtazapine 15 milliGRAM(s) Oral at bedtime  senna 1 Tablet(s) Oral at bedtime  valproate sodium IVPB 750 milliGRAM(s) IV Intermittent every 12 hours    MEDICATIONS  (PRN):  acetaminophen   Tablet .. 650 milliGRAM(s) Oral every 6 hours PRN Temp greater or equal to 38.5C (101.3F), Mild Pain (1 - 3)  hydrocortisone 2.5% Ointment 1 Application(s) Topical three times a day PRN Itching  morphine  - Injectable 2 milliGRAM(s) IV Push every 4 hours PRN Severe Pain (7 - 10)      ALLERGIES:  Allergies    crawfish (Anaphylaxis)  Keflex (Anaphylaxis)  Kiwi (Anaphylaxis)  penicillin (Anaphylaxis)  tetracycline (Anaphylaxis)    Intolerances        LABS:                        10.4   8.07  )-----------( 171      ( 28 Jul 2021 06:06 )             34.6     07-28    138  |  111<H>  |  14  ----------------------------<  95  3.6   |  18<L>  |  0.72    Ca    9.8      28 Jul 2021 06:06  Phos  2.9     07-28  Mg     2.2     07-28    TPro  5.4<L>  /  Alb  2.1<L>  /  TBili  0.3  /  DBili  x   /  AST  16  /  ALT  7<L>  /  AlkPhos  66  07-28        CAPILLARY BLOOD GLUCOSE          RADIOLOGY & ADDITIONAL TESTS: Reviewed. incomplete     INTERVAL EVENTS: KELSEY. Brain MRI completed last night, awaiting radiology read. Pt made NPO this AM for LP today.    SUBJECTIVE / INTERVAL HPI: Patient seen this AM and examined at bedside. Pt mumbling and not answering all questions. Denies fevers, chills, nausea, pain.    ROS: negative unless otherwise stated above.    VITAL SIGNS:  Vital Signs Last 24 Hrs  T(C): 36.7 (28 Jul 2021 05:27), Max: 37.4 (27 Jul 2021 23:20)  T(F): 98.1 (28 Jul 2021 05:27), Max: 99.3 (27 Jul 2021 23:20)  HR: 117 (28 Jul 2021 05:27) (108 - 117)  BP: 91/66 (28 Jul 2021 05:27) (90/57 - 107/70)  BP(mean): --  RR: 16 (28 Jul 2021 05:27) (16 - 18)  SpO2: 96% (28 Jul 2021 05:27) (96% - 100%)      07-27-21 @ 07:01  -  07-28-21 @ 07:00  --------------------------------------------------------  IN: 0 mL / OUT: 400 mL / NET: -400 mL        PHYSICAL EXAM:    General: elderly woman, laying in bed, NAD, arousable to voice  HEENT: MMM, anicteric sclera  Neck: supple  Cardiovascular: +S1/S2; RRR; no M/R/G  Respiratory: CTA B/L; no W/R/R  Gastrointestinal: soft, NT/ND; normal BS  Extremities: WWP; no edema, clubbing or cyanosis  Vascular: 2+ radial, DP/PT pulses B/L      MEDICATIONS:  MEDICATIONS  (STANDING):  acyclovir IVPB 800 milliGRAM(s) IV Intermittent every 12 hours  fentaNYL   Patch  12 MICROgram(s)/Hr 1 Patch Transdermal every 72 hours  gabapentin 100 milliGRAM(s) Oral three times a day  levETIRAcetam  IVPB 1000 milliGRAM(s) IV Intermittent every 12 hours  lidocaine   5% Patch 1 Patch Transdermal every 24 hours  melatonin 3 milliGRAM(s) Oral at bedtime  midodrine 10 milliGRAM(s) Oral every 8 hours  mirtazapine 15 milliGRAM(s) Oral at bedtime  senna 1 Tablet(s) Oral at bedtime  valproate sodium IVPB 750 milliGRAM(s) IV Intermittent every 12 hours    MEDICATIONS  (PRN):  acetaminophen   Tablet .. 650 milliGRAM(s) Oral every 6 hours PRN Temp greater or equal to 38.5C (101.3F), Mild Pain (1 - 3)  hydrocortisone 2.5% Ointment 1 Application(s) Topical three times a day PRN Itching  morphine  - Injectable 2 milliGRAM(s) IV Push every 4 hours PRN Severe Pain (7 - 10)      ALLERGIES:  Allergies    crawfish (Anaphylaxis)  Keflex (Anaphylaxis)  Kiwi (Anaphylaxis)  penicillin (Anaphylaxis)  tetracycline (Anaphylaxis)    Intolerances        LABS:                        10.4   8.07  )-----------( 171      ( 28 Jul 2021 06:06 )             34.6     07-28    138  |  111<H>  |  14  ----------------------------<  95  3.6   |  18<L>  |  0.72    Ca    9.8      28 Jul 2021 06:06  Phos  2.9     07-28  Mg     2.2     07-28    TPro  5.4<L>  /  Alb  2.1<L>  /  TBili  0.3  /  DBili  x   /  AST  16  /  ALT  7<L>  /  AlkPhos  66  07-28        CAPILLARY BLOOD GLUCOSE          RADIOLOGY & ADDITIONAL TESTS: Reviewed.

## 2021-07-28 NOTE — PROGRESS NOTE ADULT - SUBJECTIVE AND OBJECTIVE BOX
EPILEPSY PROGRESS NOTE:   Subjective:  Patient seen and examined at bedside, and no seizures reported overnight. Patient less communicative today, and report some pain. MRI obtained overnight, and pending official report.     REVIEW OF SYSTEMS:  Unable to obtain 2/2 AMS    MEDICATIONS  (STANDING):  acyclovir IVPB 800 milliGRAM(s) IV Intermittent every 12 hours  fentaNYL   Patch  12 MICROgram(s)/Hr 1 Patch Transdermal every 72 hours  gabapentin 100 milliGRAM(s) Oral three times a day  levETIRAcetam  IVPB 1000 milliGRAM(s) IV Intermittent every 12 hours  lidocaine   5% Patch 1 Patch Transdermal every 24 hours  melatonin 3 milliGRAM(s) Oral at bedtime  midodrine 10 milliGRAM(s) Oral every 8 hours  mirtazapine 15 milliGRAM(s) Oral at bedtime  senna 1 Tablet(s) Oral at bedtime  valproate sodium IVPB 750 milliGRAM(s) IV Intermittent every 12 hours    MEDICATIONS  (PRN):  acetaminophen   Tablet .. 650 milliGRAM(s) Oral every 6 hours PRN Temp greater or equal to 38.5C (101.3F), Mild Pain (1 - 3)  hydrocortisone 2.5% Ointment 1 Application(s) Topical three times a day PRN Itching  morphine  - Injectable 2 milliGRAM(s) IV Push every 4 hours PRN Severe Pain (7 - 10)    VITAL SIGNS:  T(C): 37.7 (07-28-21 @ 09:24), Max: 37.7 (07-28-21 @ 09:24)  HR: 122 (07-28-21 @ 09:24) (108 - 122)  BP: 103/70 (07-28-21 @ 09:24) (90/57 - 107/70)  RR: 16 (07-28-21 @ 09:24) (16 - 18)  SpO2: 98% (07-28-21 @ 09:24) (96% - 100%)  Wt(kg): --    PHYSICAL EXAM:  Sleeping but easily arousable to verbal stimuli, and less communicative today, did not state name, place or date. Minimally conversant, answered yes and no.   PERRLA 3mm brisk, EOMI, no nystagmus. No facial weakness appreciated  B/L LE 3+ pitting edema w RLE erythema and warm to touch  Upper extremities moves to command, at least 3/5  Bilateral LE moves minimally w/ gross toe wiggle  1+ B/L UE, absent in LE and toes mu    LABS:  CBC Full  -  ( 28 Jul 2021 06:06 )  WBC Count : 8.07 K/uL  RBC Count : 3.48 M/uL  Hemoglobin : 10.4 g/dL  Hematocrit : 34.6 %  Platelet Count - Automated : 171 K/uL  Mean Cell Volume : 99.4 fl  Mean Cell Hemoglobin : 29.9 pg  Mean Cell Hemoglobin Concentration : 30.1 gm/dL  Auto Neutrophil # : 6.12 K/uL  Auto Lymphocyte # : 1.07 K/uL  Auto Monocyte # : 0.59 K/uL  Auto Eosinophil # : 0.16 K/uL  Auto Basophil # : 0.02 K/uL  Auto Neutrophil % : 75.8 %  Auto Lymphocyte % : 13.3 %  Auto Monocyte % : 7.3 %  Auto Eosinophil % : 2.0 %  Auto Basophil % : 0.2 %    07-28    138  |  111<H>  |  14  ----------------------------<  95  3.6   |  18<L>  |  0.72    Ca    9.8      28 Jul 2021 06:06  Phos  2.9     07-28  Mg     2.2     07-28    TPro  5.4<L>  /  Alb  2.1<L>  /  TBili  0.3  /  DBili  x   /  AST  16  /  ALT  7<L>  /  AlkPhos  66  07-28    LIVER FUNCTIONS - ( 28 Jul 2021 06:06 )  Alb: 2.1 g/dL / Pro: 5.4 g/dL / ALK PHOS: 66 U/L / ALT: 7 U/L / AST: 16 U/L / GGT: x              EPILEPSY PROGRESS NOTE:   Subjective:  Patient seen and examined at bedside, and no seizures reported overnight. Patient less communicative today, and report some pain. MRI obtained overnight, and pending official report - showing dural lesion T12 (likely met) and multiple intra-muscular metastatic lesions in the paraspinal muscles on lumbar MRI.  The MRI brain was reviewed and no significant edema though with right frontal white matter changes.    REVIEW OF SYSTEMS:  Unable to obtain 2/2 AMS    MEDICATIONS  (STANDING):  acyclovir IVPB 800 milliGRAM(s) IV Intermittent every 12 hours  fentaNYL   Patch  12 MICROgram(s)/Hr 1 Patch Transdermal every 72 hours  gabapentin 100 milliGRAM(s) Oral three times a day  levETIRAcetam  IVPB 1000 milliGRAM(s) IV Intermittent every 12 hours  lidocaine   5% Patch 1 Patch Transdermal every 24 hours  melatonin 3 milliGRAM(s) Oral at bedtime  midodrine 10 milliGRAM(s) Oral every 8 hours  mirtazapine 15 milliGRAM(s) Oral at bedtime  senna 1 Tablet(s) Oral at bedtime  valproate sodium IVPB 750 milliGRAM(s) IV Intermittent every 12 hours    MEDICATIONS  (PRN):  acetaminophen   Tablet .. 650 milliGRAM(s) Oral every 6 hours PRN Temp greater or equal to 38.5C (101.3F), Mild Pain (1 - 3)  hydrocortisone 2.5% Ointment 1 Application(s) Topical three times a day PRN Itching  morphine  - Injectable 2 milliGRAM(s) IV Push every 4 hours PRN Severe Pain (7 - 10)    VITAL SIGNS:  T(C): 37.7 (07-28-21 @ 09:24), Max: 37.7 (07-28-21 @ 09:24)  HR: 122 (07-28-21 @ 09:24) (108 - 122)  BP: 103/70 (07-28-21 @ 09:24) (90/57 - 107/70)  RR: 16 (07-28-21 @ 09:24) (16 - 18)  SpO2: 98% (07-28-21 @ 09:24) (96% - 100%)  Wt(kg): --    PHYSICAL EXAM:  Sleeping but easily arousable to verbal stimuli, and less communicative today, did not state name, place or date. Minimally conversant, answered yes and no.   PERRLA 3mm brisk, EOMI, no nystagmus. No facial weakness appreciated  B/L LE 3+ pitting edema w RLE erythema and warm to touch  Upper extremities moves to command, at least 3/5  Bilateral LE moves minimally w/ gross toe wiggle  1+ B/L UE, absent in LE and toes mu    LABS:  CBC Full  -  ( 28 Jul 2021 06:06 )  WBC Count : 8.07 K/uL  RBC Count : 3.48 M/uL  Hemoglobin : 10.4 g/dL  Hematocrit : 34.6 %  Platelet Count - Automated : 171 K/uL  Mean Cell Volume : 99.4 fl  Mean Cell Hemoglobin : 29.9 pg  Mean Cell Hemoglobin Concentration : 30.1 gm/dL  Auto Neutrophil # : 6.12 K/uL  Auto Lymphocyte # : 1.07 K/uL  Auto Monocyte # : 0.59 K/uL  Auto Eosinophil # : 0.16 K/uL  Auto Basophil # : 0.02 K/uL  Auto Neutrophil % : 75.8 %  Auto Lymphocyte % : 13.3 %  Auto Monocyte % : 7.3 %  Auto Eosinophil % : 2.0 %  Auto Basophil % : 0.2 %    07-28    138  |  111<H>  |  14  ----------------------------<  95  3.6   |  18<L>  |  0.72    Ca    9.8      28 Jul 2021 06:06  Phos  2.9     07-28  Mg     2.2     07-28    TPro  5.4<L>  /  Alb  2.1<L>  /  TBili  0.3  /  DBili  x   /  AST  16  /  ALT  7<L>  /  AlkPhos  66  07-28    LIVER FUNCTIONS - ( 28 Jul 2021 06:06 )  Alb: 2.1 g/dL / Pro: 5.4 g/dL / ALK PHOS: 66 U/L / ALT: 7 U/L / AST: 16 U/L / GGT: x

## 2021-07-28 NOTE — PROGRESS NOTE ADULT - ASSESSMENT
75y old Female with PMHx of Melanoma with mets to brain, spine  and multiple organs, HTN, PE (on Enoxaparin), seizure on Keppra secondary to brain mets, S/P craniotomy x2(June 2020, July 2021), shingles (recently treated at [facility]), chronic back pain 2/2 mets who presented to the ED brought in by her daughter with concerns of left side weakness, altered mental status and left side facial droop. Hospital course c/b high fevers tmax 103 tachycardia and hypotension resolved after fluid boluses. Patient empirically started on meropenem. Bcx NGTD, Ucx with E faecium however patient without urinary symptoms. EEG with epileptiform potential in R frontal lobe without epileptiform activity. 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 Obtained MRI head and lumbar spine yesterday to evaluate for mets/malignancy recurrence. Plan for LP pending MRI results. Palliative care consulted for GOC discussion and pain management. Prior leukocytosis remains resolved and pt remains afebrile at this time.     Recommendations:   - Continue acyclovir 800mg q8hrs. 2/2 recent hx of shingles & c/f VZV encephalitis. Maintain IV hydration while on acyclovir  - F/u results MRI head and lumbar spine when available to r/o residual/recurrent malignancy (hx melanoma)  - Obtain LP: CSF cell count, protein, glucose, CSF PCR, CSF cx, AFB cx, cytology, and fungal cx (please check all of these). F/u results  - F/u UCx susceptibilities    ID team 1 following 75y old Female with PMHx of Melanoma with mets to brain, spine  and multiple organs, HTN, PE (on Enoxaparin), seizure on Keppra secondary to brain mets, S/P craniotomy x2(June 2020, July 2021), shingles (recently treated at [facility]), chronic back pain 2/2 mets who presented to the ED brought in by her daughter with concerns of left side weakness, altered mental status and left side facial droop. Hospital course c/b high fevers tmax 103 tachycardia and hypotension resolved after fluid boluses. Patient empirically started on meropenem. Bcx NGTD, Ucx with E faecium however patient without urinary symptoms. EEG with epileptiform potential in R frontal lobe without epileptiform activity. 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 Obtained MRI head and lumbar spine yesterday to evaluate for mets/malignancy recurrence. Palliative care consulted for GOC discussion and pain management. Prior leukocytosis remains resolved and pt remains afebrile at this time. MRI head and lumbar spine consistent w/ hx of metastatic melanoma w/ bony and intramuscular mets to lumbar spine; no acute pathology noted. Ex vacuo ventricular dilation & R frontal lobar changes likely 2/2 postsurgical/post-RT changes. LP performed this morning by IR. No organisms found on CSF gram stain at this time. Pending additional testing from LP as requested yesterday.     Recommendations:   - Continue acyclovir 800mg q8hrs. 2/2 recent hx of shingles & c/f VZV encephalitis. Maintain IV hydration while on acyclovir  - CSF: F/u protein, glucose, CSF PCR, CSF cx, AFB cx, cytology, and fungal cx (please check all of these!)    ID team 1 following 75y old Female with PMHx of Melanoma with mets to brain, spine  and multiple organs, HTN, PE (on Enoxaparin), seizure on Keppra secondary to brain mets, S/P craniotomy x2(June 2020, July 2021), shingles (recently treated at [facility]), chronic back pain 2/2 mets who presented to the ED brought in by her daughter with concerns of left side weakness, altered mental status and left side facial droop. Hospital course c/b high fevers tmax 103 tachycardia and hypotension resolved after fluid boluses. Patient empirically started on meropenem. Bcx NGTD, Ucx with E faecium however patient without urinary symptoms. EEG with epileptiform potential in R frontal lobe without epileptiform activity. 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 Obtained MRI head and lumbar spine yesterday to evaluate for mets/malignancy recurrence. Palliative care consulted for GOC discussion and pain management. Prior leukocytosis remains resolved and pt remains afebrile at this time. MRI head and lumbar spine consistent w/ hx of metastatic melanoma w/ bony and intramuscular mets to lumbar spine; no acute pathology noted. Ex vacuo ventricular dilation & R frontal lobar changes likely 2/2 postsurgical/post-RT changes. LP performed this morning by IR. No organisms found on CSF gram stain at this time. Protein and glucose normal, unremarkable cell count. Unlikely related to VZV encephalitis, consider more probable 2/2 hx of metastatic melanoma.     Recommendations:   - Stop acyclovir 800mg q8hrs. as LP findings make VZV encephalitis unlikely    ID team 1 signing off. Re-consult as necessary

## 2021-07-28 NOTE — EEG REPORT - NS EEG TEXT BOX
[facility] Department of Neurology  Inpatient Continuous video-Electroencephalogram    Patient Name:	RYNE NATION    :	1945  MRN:	[id number]    Study Start Date/Time:  2021, 11:43:02 AM  Study End Date/Time:	2021, 9:03 PM    Referred by: Dr. Kerwin Shah    Brief Clinical History:  RYNE NATION is a 75 year old Female admitted from nursing home, due to left-sided weakness.  Known prior craniotomies (right) for metastatic melanoma (brain and spine).  Concern for seizure vs stroke, with initial stroke work-up negative; study performed to investigate for seizures or markers of epilepsy.  Technologist notes:-    Diagnosis Code:   R56.9 convulsions/seizure  CPT: 45788 EEG with video 12-26h  Technical CPT: 35791 set-up +  64073 vEEG unmonitored 12-26h    Pertinent Medications:  Levetiracetam 1000mg bid; Depakote 500mg bid    Acquisition Details:  Electroencephalography was acquired using a minimum of 21 channels on an [employer] system v 8.5.1 with electrode placement according to the standard International 10-20 system following ACNS (American Clinical Neurophysiology Society) guidelines for Long-Term Video EEG monitoring.  Anterior temporal T1 and T2 electrodes were utilized whenever possible.   The [employer] automated spike & seizure detections were all reviewed in detail, in addition to extensive portions of raw EEG.    Day 1: 2021 @ 11:43:02 AM to next morning @ 07:00 am  Background:  continuous, with predominantly theta>delta frequencies.  Symmetry:  No persistent asymmetries of voltage or frequency.  Posterior Dominant Rhythm:  No clear PDR  Organization: Absent.  Voltage:  Normal (20+ uV)  Variability: Yes. 		Reactivity: Yes.  N2 sleep: Absent.  Spontaneous Activity:  No epileptiform discharges.  Periodic/rhythmic activity:  Yes (described below):   1Hz periodic discharges, sharply contoured, but not epileptiform in nature.  Events:  No electrographic seizures or significant clinical events.  Provocations:  Hyperventilation and Photic stimulation: was not performed.  EKG: -140.    Daily Summary:    Continuous Moderate generalized slowing, with focal right frontal intermittent sharp delta, often in 1Hz periodic discharges, though not epileptiform individually.  Findings suggest epileptic potential from the right frontal region, though no seizures were identified, along with diffuse cerebral dysfunction.      Clay Elder MD  Attending Neurologist, [facility]         Daily Updates (from 07:00 am until 07:00 am):  Day :   Pertinent medications:   Awake Background:  continuous, with predominantly theta-delta frequencies.  Symmetry:  Continuous ((90+%) right frontal polymorphic delta.  Organization: Absent.  Posterior Dominant Rhythm: absent  Voltage:  Normal (20+ uV)  Variability: Yes. 		Reactivity: Yes.  N2 sleep: absent.  Spontaneous Activity:  Occasional (1+/hr <1/min) right frontal (F4) sharp waves.  Periodic/rhythmic activity:  Occasional runs of 1 Hz sharp delta waves, lasting up to 20 seconds.  Events:  No electrographic seizures or significant clinical events.  Provocations:  Hyperventilation and Photic stimulation: not performed.   Daily Summary:    Right frontal sharp waves and runs of lateralized periodic discharges in the same region are indicators of increased epileptic potential focally in this region.  However, no clinical or electrographic seizures were identified.  The background was poorly organized, suggestive of a mild to moderate diffuse or multifocal cerebral dysfunction.      Clay Elder MD  Attending Neurologist, [facility]      Daily Updates:  Day 3  July 27, to 21:03   Pertinent medications: VPA 500mg q8h  Awake Background:  continuous, with predominantly theta-delta frequencies.  Symmetry:  Continuous ((90+%) right frontal polymorphic delta.  Organization: Absent.  Posterior Dominant Rhythm: absent  Voltage:  Normal (20+ uV)  Variability: Yes. 		Reactivity: Yes.  N2 sleep: absent.  Spontaneous Activity:  Occasional (1+/hr <1/min) right frontal (F4) sharp waves, with improvement through the day.  Periodic/rhythmic activity:  Occasional runs of 1 Hz sharp delta waves, lasting up to 15 seconds.  Events:  No electrographic seizures or significant clinical events.  Provocations:  Hyperventilation and Photic stimulation: not performed.    Daily Summary:    Right frontal sharp waves and runs of lateralized periodic discharges in the same region mildly improved compared to the prior day.  These are indicators of increased epileptic potential focally in this region though no clinical or electrographic seizures were identified and there was some improvement as noted  The background was poorly organized, suggestive of a mild to moderate diffuse or multifocal cerebral dysfunction.      Clay Elder MD  Attending Neurologist, [facility]      FINAL Summary:  Abnormal continuous av-EEG study.  1)	Right frontal polymorphic delta throughout the recording.  2)	Right frontal sharp waves and runs of lateralized periodic discharges in the same region, with mild reduction in both through the recording.  3)	The background was poorly organized for much of this recording, with brief periods with a rudimentary organization.    Final Clinical Correlation:  1)	These are indicators of right frontal cerebral dysfunction with increased epileptic potential focally in this region, though no clinical or electrographic seizures were identified and there was some improvement over the recording.  2)	The poor overall organization and lack of clear wakeful state is suggestive of mild to moderate diffuse or multifocal cerebral dysfunction.        Clay Elder MD  Attending Neurologist, [facility]

## 2021-07-28 NOTE — CONSULT NOTE ADULT - CONSULT REASON
metastatic melanoma, symptom management
AMS, R Hemineglect, Weakness.
encephalitis
hypotension
request for lumbar puncture

## 2021-07-28 NOTE — CHART NOTE - NSCHARTNOTEFT_GEN_A_CORE
Care of the patient discussed with daughter Michael. She would like to speak to primary team in the AM regarding care [phone]. She would also like to speak to an attending regarding antibiotics

## 2021-07-29 ENCOUNTER — TRANSCRIPTION ENCOUNTER (OUTPATIENT)
Age: 76
End: 2021-07-29

## 2021-07-29 LAB
ALBUMIN CSF-MCNC: 24.4 MG/DL — SIGNIFICANT CHANGE UP (ref 14–25)
ANION GAP SERPL CALC-SCNC: 8 MMOL/L — SIGNIFICANT CHANGE UP (ref 5–17)
APPEARANCE UR: CLEAR — SIGNIFICANT CHANGE UP
BACTERIA # UR AUTO: PRESENT /HPF
BASOPHILS # BLD AUTO: 0.02 K/UL — SIGNIFICANT CHANGE UP (ref 0–0.2)
BASOPHILS NFR BLD AUTO: 0.3 % — SIGNIFICANT CHANGE UP (ref 0–2)
BILIRUB UR-MCNC: ABNORMAL
BUN SERPL-MCNC: 12 MG/DL — SIGNIFICANT CHANGE UP (ref 7–23)
CALCIUM SERPL-MCNC: 9.8 MG/DL — SIGNIFICANT CHANGE UP (ref 8.4–10.5)
CHLORIDE SERPL-SCNC: 114 MMOL/L — HIGH (ref 96–108)
CO2 SERPL-SCNC: 18 MMOL/L — LOW (ref 22–31)
COLOR SPEC: YELLOW — SIGNIFICANT CHANGE UP
CREAT SERPL-MCNC: 0.62 MG/DL — SIGNIFICANT CHANGE UP (ref 0.5–1.3)
DIFF PNL FLD: ABNORMAL
EOSINOPHIL # BLD AUTO: 0.16 K/UL — SIGNIFICANT CHANGE UP (ref 0–0.5)
EOSINOPHIL NFR BLD AUTO: 2.2 % — SIGNIFICANT CHANGE UP (ref 0–6)
EPI CELLS # UR: ABNORMAL /HPF (ref 0–5)
GLUCOSE BLDC GLUCOMTR-MCNC: 112 MG/DL — HIGH (ref 70–99)
GLUCOSE BLDC GLUCOMTR-MCNC: 119 MG/DL — HIGH (ref 70–99)
GLUCOSE SERPL-MCNC: 104 MG/DL — HIGH (ref 70–99)
GLUCOSE UR QL: NEGATIVE — SIGNIFICANT CHANGE UP
HCT VFR BLD CALC: 31.6 % — LOW (ref 34.5–45)
HGB BLD-MCNC: 9.8 G/DL — LOW (ref 11.5–15.5)
IGG CSF-MCNC: 3.3 MG/DL — SIGNIFICANT CHANGE UP
IGG SYNTH RATE SER+CSF CALC-MRATE: 2.4 MG/DAY — SIGNIFICANT CHANGE UP
IGG/ALB CLEAR SER+CSF-RTO: 0.6 — SIGNIFICANT CHANGE UP
IGG/ALB CSF: 0.14 RATIO — SIGNIFICANT CHANGE UP
IMM GRANULOCYTES NFR BLD AUTO: 1.4 % — SIGNIFICANT CHANGE UP (ref 0–1.5)
KETONES UR-MCNC: 15 MG/DL
LEUKOCYTE ESTERASE UR-ACNC: NEGATIVE — SIGNIFICANT CHANGE UP
LYMPHOCYTES # BLD AUTO: 0.92 K/UL — LOW (ref 1–3.3)
LYMPHOCYTES # BLD AUTO: 12.6 % — LOW (ref 13–44)
MAGNESIUM SERPL-MCNC: 2 MG/DL — SIGNIFICANT CHANGE UP (ref 1.6–2.6)
MCHC RBC-ENTMCNC: 29.9 PG — SIGNIFICANT CHANGE UP (ref 27–34)
MCHC RBC-ENTMCNC: 31 GM/DL — LOW (ref 32–36)
MCV RBC AUTO: 96.3 FL — SIGNIFICANT CHANGE UP (ref 80–100)
MONOCYTES # BLD AUTO: 0.61 K/UL — SIGNIFICANT CHANGE UP (ref 0–0.9)
MONOCYTES NFR BLD AUTO: 8.4 % — SIGNIFICANT CHANGE UP (ref 2–14)
NEUTROPHILS # BLD AUTO: 5.48 K/UL — SIGNIFICANT CHANGE UP (ref 1.8–7.4)
NEUTROPHILS NFR BLD AUTO: 75.1 % — SIGNIFICANT CHANGE UP (ref 43–77)
NITRITE UR-MCNC: NEGATIVE — SIGNIFICANT CHANGE UP
NON-GYNECOLOGICAL CYTOLOGY STUDY: SIGNIFICANT CHANGE UP
NRBC # BLD: 0 /100 WBCS — SIGNIFICANT CHANGE UP (ref 0–0)
PH UR: 6.5 — SIGNIFICANT CHANGE UP (ref 5–8)
PLATELET # BLD AUTO: 179 K/UL — SIGNIFICANT CHANGE UP (ref 150–400)
POTASSIUM SERPL-MCNC: 3.2 MMOL/L — LOW (ref 3.5–5.3)
POTASSIUM SERPL-SCNC: 3.2 MMOL/L — LOW (ref 3.5–5.3)
PROT UR-MCNC: 100 MG/DL
RBC # BLD: 3.28 M/UL — LOW (ref 3.8–5.2)
RBC # FLD: 14.1 % — SIGNIFICANT CHANGE UP (ref 10.3–14.5)
RBC CASTS # UR COMP ASSIST: ABNORMAL /HPF
SODIUM SERPL-SCNC: 140 MMOL/L — SIGNIFICANT CHANGE UP (ref 135–145)
SP GR SPEC: 1.01 — SIGNIFICANT CHANGE UP (ref 1–1.03)
UROBILINOGEN FLD QL: 1 E.U./DL — SIGNIFICANT CHANGE UP
VALPROATE SERPL-MCNC: 28.1 UG/ML — LOW (ref 50–100)
VZV DNA, PCR RESULT: NEGATIVE — SIGNIFICANT CHANGE UP
WBC # BLD: 7.29 K/UL — SIGNIFICANT CHANGE UP (ref 3.8–10.5)
WBC # FLD AUTO: 7.29 K/UL — SIGNIFICANT CHANGE UP (ref 3.8–10.5)
WBC UR QL: < 5 /HPF — SIGNIFICANT CHANGE UP

## 2021-07-29 PROCEDURE — 99233 SBSQ HOSP IP/OBS HIGH 50: CPT

## 2021-07-29 PROCEDURE — 71045 X-RAY EXAM CHEST 1 VIEW: CPT | Mod: 26

## 2021-07-29 PROCEDURE — 99233 SBSQ HOSP IP/OBS HIGH 50: CPT | Mod: GC

## 2021-07-29 PROCEDURE — 99497 ADVNCD CARE PLAN 30 MIN: CPT

## 2021-07-29 RX ORDER — AMLODIPINE BESYLATE 2.5 MG/1
1 TABLET ORAL
Qty: 0 | Refills: 0 | DISCHARGE

## 2021-07-29 RX ORDER — LEVETIRACETAM 250 MG/1
10 TABLET, FILM COATED ORAL
Qty: 0 | Refills: 0 | DISCHARGE
Start: 2021-07-29

## 2021-07-29 RX ORDER — PANTOPRAZOLE SODIUM 20 MG/1
1 TABLET, DELAYED RELEASE ORAL
Qty: 0 | Refills: 0 | DISCHARGE

## 2021-07-29 RX ORDER — ACETAMINOPHEN 500 MG
1000 TABLET ORAL ONCE
Refills: 0 | Status: COMPLETED | OUTPATIENT
Start: 2021-07-29 | End: 2021-07-29

## 2021-07-29 RX ORDER — LANOLIN ALCOHOL/MO/W.PET/CERES
1 CREAM (GRAM) TOPICAL
Qty: 0 | Refills: 0 | DISCHARGE
Start: 2021-07-29

## 2021-07-29 RX ORDER — DEXTROSE 50 % IN WATER 50 %
15 SYRINGE (ML) INTRAVENOUS ONCE
Refills: 0 | Status: DISCONTINUED | OUTPATIENT
Start: 2021-07-29 | End: 2021-08-01

## 2021-07-29 RX ORDER — SODIUM CHLORIDE 9 MG/ML
1000 INJECTION, SOLUTION INTRAVENOUS
Refills: 0 | Status: DISCONTINUED | OUTPATIENT
Start: 2021-07-29 | End: 2021-08-01

## 2021-07-29 RX ORDER — DEXTROSE 50 % IN WATER 50 %
50 SYRINGE (ML) INTRAVENOUS
Qty: 0 | Refills: 0 | DISCHARGE
Start: 2021-07-29

## 2021-07-29 RX ORDER — SENNA PLUS 8.6 MG/1
1 TABLET ORAL
Qty: 0 | Refills: 0 | DISCHARGE

## 2021-07-29 RX ORDER — ACETAMINOPHEN 500 MG
2 TABLET ORAL
Qty: 0 | Refills: 0 | DISCHARGE
Start: 2021-07-29

## 2021-07-29 RX ORDER — GLUCAGON INJECTION, SOLUTION 0.5 MG/.1ML
1 INJECTION, SOLUTION SUBCUTANEOUS ONCE
Refills: 0 | Status: DISCONTINUED | OUTPATIENT
Start: 2021-07-29 | End: 2021-08-01

## 2021-07-29 RX ORDER — RAMELTEON 8 MG
1 TABLET ORAL
Qty: 0 | Refills: 0 | DISCHARGE

## 2021-07-29 RX ORDER — DEXAMETHASONE 0.5 MG/5ML
2 ELIXIR ORAL EVERY 12 HOURS
Refills: 0 | Status: DISCONTINUED | OUTPATIENT
Start: 2021-07-29 | End: 2021-08-01

## 2021-07-29 RX ORDER — LOPERAMIDE HCL 2 MG
10 TABLET ORAL
Qty: 0 | Refills: 0 | DISCHARGE

## 2021-07-29 RX ORDER — DEXTROSE 50 % IN WATER 50 %
1 SYRINGE (ML) INTRAVENOUS
Qty: 0 | Refills: 0 | DISCHARGE
Start: 2021-07-29

## 2021-07-29 RX ORDER — MIDODRINE HYDROCHLORIDE 2.5 MG/1
1 TABLET ORAL
Qty: 0 | Refills: 0 | DISCHARGE
Start: 2021-07-29

## 2021-07-29 RX ORDER — MIDODRINE HYDROCHLORIDE 2.5 MG/1
5 TABLET ORAL EVERY 8 HOURS
Refills: 0 | Status: DISCONTINUED | OUTPATIENT
Start: 2021-07-29 | End: 2021-07-30

## 2021-07-29 RX ORDER — GABAPENTIN 400 MG/1
100 CAPSULE ORAL EVERY 12 HOURS
Refills: 0 | Status: DISCONTINUED | OUTPATIENT
Start: 2021-07-29 | End: 2021-08-01

## 2021-07-29 RX ORDER — VALPROIC ACID (AS SODIUM SALT) 250 MG/5ML
7.5 SOLUTION, ORAL ORAL
Qty: 0 | Refills: 0 | DISCHARGE
Start: 2021-07-29

## 2021-07-29 RX ORDER — HYDROCORTISONE 1 %
1 OINTMENT (GRAM) TOPICAL
Qty: 0 | Refills: 0 | DISCHARGE
Start: 2021-07-29

## 2021-07-29 RX ORDER — LABETALOL HCL 100 MG
1 TABLET ORAL
Qty: 0 | Refills: 0 | DISCHARGE

## 2021-07-29 RX ORDER — LIDOCAINE 4 G/100G
1 CREAM TOPICAL
Qty: 0 | Refills: 0 | DISCHARGE
Start: 2021-07-29

## 2021-07-29 RX ORDER — DEXTROSE 50 % IN WATER 50 %
25 SYRINGE (ML) INTRAVENOUS ONCE
Refills: 0 | Status: DISCONTINUED | OUTPATIENT
Start: 2021-07-29 | End: 2021-08-01

## 2021-07-29 RX ORDER — SENNA PLUS 8.6 MG/1
1 TABLET ORAL
Qty: 0 | Refills: 0 | DISCHARGE
Start: 2021-07-29

## 2021-07-29 RX ORDER — DEXAMETHASONE 0.5 MG/5ML
8.33 ELIXIR ORAL
Qty: 0 | Refills: 0 | DISCHARGE
Start: 2021-07-29

## 2021-07-29 RX ORDER — CALCIUM CARBONATE 500(1250)
0 TABLET ORAL
Qty: 0 | Refills: 0 | DISCHARGE

## 2021-07-29 RX ORDER — DEXAMETHASONE 0.5 MG/5ML
2 ELIXIR ORAL EVERY 12 HOURS
Refills: 0 | Status: DISCONTINUED | OUTPATIENT
Start: 2021-07-29 | End: 2021-07-29

## 2021-07-29 RX ORDER — FENTANYL CITRATE 50 UG/ML
1 INJECTION INTRAVENOUS
Qty: 0 | Refills: 0 | DISCHARGE

## 2021-07-29 RX ORDER — INSULIN LISPRO 100/ML
VIAL (ML) SUBCUTANEOUS
Refills: 0 | Status: DISCONTINUED | OUTPATIENT
Start: 2021-07-29 | End: 2021-08-01

## 2021-07-29 RX ORDER — ENOXAPARIN SODIUM 100 MG/ML
40 INJECTION SUBCUTANEOUS EVERY 24 HOURS
Refills: 0 | Status: DISCONTINUED | OUTPATIENT
Start: 2021-07-29 | End: 2021-08-01

## 2021-07-29 RX ORDER — FENTANYL CITRATE 50 UG/ML
1 INJECTION INTRAVENOUS
Qty: 0 | Refills: 0 | DISCHARGE
Start: 2021-07-29

## 2021-07-29 RX ORDER — INSULIN LISPRO 100/ML
2 VIAL (ML) SUBCUTANEOUS
Qty: 0 | Refills: 0 | DISCHARGE
Start: 2021-07-29

## 2021-07-29 RX ORDER — OXYCODONE HYDROCHLORIDE 5 MG/1
2 TABLET ORAL
Qty: 0 | Refills: 0 | DISCHARGE

## 2021-07-29 RX ORDER — ENOXAPARIN SODIUM 100 MG/ML
40 INJECTION SUBCUTANEOUS
Qty: 0 | Refills: 0 | DISCHARGE
Start: 2021-07-29

## 2021-07-29 RX ORDER — POTASSIUM CHLORIDE 20 MEQ
20 PACKET (EA) ORAL
Refills: 0 | Status: COMPLETED | OUTPATIENT
Start: 2021-07-29 | End: 2021-07-29

## 2021-07-29 RX ORDER — DEXTROSE 50 % IN WATER 50 %
25 SYRINGE (ML) INTRAVENOUS
Qty: 0 | Refills: 0 | DISCHARGE
Start: 2021-07-29

## 2021-07-29 RX ORDER — ENOXAPARIN SODIUM 100 MG/ML
0 INJECTION SUBCUTANEOUS
Qty: 0 | Refills: 0 | DISCHARGE

## 2021-07-29 RX ORDER — DEXTROSE 50 % IN WATER 50 %
12.5 SYRINGE (ML) INTRAVENOUS ONCE
Refills: 0 | Status: DISCONTINUED | OUTPATIENT
Start: 2021-07-29 | End: 2021-08-01

## 2021-07-29 RX ORDER — SODIUM CHLORIDE 9 MG/ML
500 INJECTION, SOLUTION INTRAVENOUS ONCE
Refills: 0 | Status: COMPLETED | OUTPATIENT
Start: 2021-07-29 | End: 2021-07-29

## 2021-07-29 RX ORDER — DIPHENHYDRAMINE HCL 50 MG
0 CAPSULE ORAL
Qty: 0 | Refills: 0 | DISCHARGE

## 2021-07-29 RX ORDER — MORPHINE SULFATE 50 MG/1
4 CAPSULE, EXTENDED RELEASE ORAL
Qty: 0 | Refills: 0 | DISCHARGE
Start: 2021-07-29

## 2021-07-29 RX ADMIN — FENTANYL CITRATE 1 PATCH: 50 INJECTION INTRAVENOUS at 18:14

## 2021-07-29 RX ADMIN — Medication 400 MILLIGRAM(S): at 05:42

## 2021-07-29 RX ADMIN — MORPHINE SULFATE 4 MILLIGRAM(S): 50 CAPSULE, EXTENDED RELEASE ORAL at 18:13

## 2021-07-29 RX ADMIN — Medication 50 MILLIEQUIVALENT(S): at 15:53

## 2021-07-29 RX ADMIN — SODIUM CHLORIDE 500 MILLILITER(S): 9 INJECTION, SOLUTION INTRAVENOUS at 10:14

## 2021-07-29 RX ADMIN — Medication 50 MILLIEQUIVALENT(S): at 14:06

## 2021-07-29 RX ADMIN — ENOXAPARIN SODIUM 40 MILLIGRAM(S): 100 INJECTION SUBCUTANEOUS at 09:37

## 2021-07-29 RX ADMIN — Medication 28.75 MILLIGRAM(S): at 05:44

## 2021-07-29 RX ADMIN — LEVETIRACETAM 400 MILLIGRAM(S): 250 TABLET, FILM COATED ORAL at 05:44

## 2021-07-29 RX ADMIN — Medication 1000 MILLIGRAM(S): at 06:15

## 2021-07-29 RX ADMIN — Medication 28.75 MILLIGRAM(S): at 18:36

## 2021-07-29 RX ADMIN — FENTANYL CITRATE 1 PATCH: 50 INJECTION INTRAVENOUS at 05:49

## 2021-07-29 RX ADMIN — LEVETIRACETAM 400 MILLIGRAM(S): 250 TABLET, FILM COATED ORAL at 17:39

## 2021-07-29 RX ADMIN — Medication 2 MILLIGRAM(S): at 14:06

## 2021-07-29 RX ADMIN — MORPHINE SULFATE 4 MILLIGRAM(S): 50 CAPSULE, EXTENDED RELEASE ORAL at 17:39

## 2021-07-29 NOTE — PROGRESS NOTE ADULT - ASSESSMENT
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 Stroke work-up was negative for acute weakness, and there were no seizures recorded on EEG monitoring.      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 LP baseline results WBC and protein normal - encephalitis PCR negative:  no obvious infection.Does not rule out VZV encephalitis, carlos partially treated with continued acyclovir.     Family requesting transfer to [facility] where her neurology team is.    Recommendations:  - Continue Keppra 1000mg Q12hrs  - Continue Depakote 750mg Q12hrs   - F/u Valproic acid levels   - Maintain seizure and fall precautions  - Can consider dexamethasone 2mg bid, as per prior admissions which may help pain and potentially improve minimal edema in the CNS, if ID agreeable.

## 2021-07-29 NOTE — PROGRESS NOTE ADULT - PROBLEM SELECTOR PLAN 3
Pt w/ metastatic melanoma with metastases at brain/spine/multiple organs. Chronic back pain 2/2 spinal mets. Poor prognosis.   - Dr. Salas open to being involved in discussion with GOC.  - Palliative consulted  - c/w fentanyl patch  - c/w morphine 4mg IV q4 PRN for severe pain  - cw home gabapentin 100 mg PO BID  - Brain MRI: poor quality, no signs of additional mets, no sign of swelling.   - Spine MRI: 1.0 cm intradural mass at T12, may be metastases. Additional bony metastases of L1 and multiple intramuscular mets within the lumbar spine.

## 2021-07-29 NOTE — DISCHARGE NOTE PROVIDER - NSDCFUADDINST_GEN_ALL_CORE_FT
Your care is being transferred to [facility]. Please be sure to follow up with your oncologist.

## 2021-07-29 NOTE — DISCHARGE NOTE PROVIDER - NSDCCPCAREPLAN_GEN_ALL_CORE_FT
PRINCIPAL DISCHARGE DIAGNOSIS  Diagnosis: Altered mental status  Assessment and Plan of Treatment: You presented to the hospital with altered mental status. Altered mental status can occur do to various causes including infection, stroke, brain injury, or metabolic abnormalities. Extensive testing was performed during your hospital stay. There were no metabolic abnormalities on your blood tests. Imaging, blood tests, and a lumbar puncture was performed to evaluate for infection but none of these tests showed sign of infection. A CT scan and MRI of the head was taken to visualize the brain and there was no sign of stroke or bleeding in the brain.      SECONDARY DISCHARGE DIAGNOSES  Diagnosis: Metastatic melanoma  Assessment and Plan of Treatment:      PRINCIPAL DISCHARGE DIAGNOSIS  Diagnosis: Altered mental status  Assessment and Plan of Treatment: You presented to the hospital with altered mental status. Altered mental status can occur do to various causes including infection, stroke, brain injury, or metabolic abnormalities. Extensive testing was performed during your hospital stay. There were no metabolic abnormalities on your blood tests. Imaging, blood tests, and a lumbar puncture was performed to evaluate for infection but none of these tests showed sign of infection. A CT scan and MRI of the head was taken to visualize the brain and there was no sign of stroke or bleeding in the brain. Based on all of the testing, your altered mental status is believed to be due to spread of melanoma cancer to the brain.      SECONDARY DISCHARGE DIAGNOSES  Diagnosis: Metastatic melanoma  Assessment and Plan of Treatment: You have a history of melanoma with spread to different parts of the body, including the brain. Your oncologist and neurosurgeon were contacted to share information. Imaging was taken of the spine and brain to evaluate for further spread of cancer. You were given a CD with images and reports of the Radiology images. Please share these with the physicians at [facility], where you will continue your care.     PRINCIPAL DISCHARGE DIAGNOSIS  Diagnosis: Altered mental status  Assessment and Plan of Treatment: You presented to the hospital with altered mental status. Altered mental status can occur do to various causes including infection, stroke, brain injury, or metabolic abnormalities. Extensive testing was performed during your hospital stay. There were no metabolic abnormalities on your blood tests. Imaging, blood tests, and a lumbar puncture was performed to evaluate for infection but none of these tests showed sign of infection. A CT scan and MRI of the head was taken to visualize the brain and there was no sign of stroke or bleeding in the brain. Based on all of the testing, your altered mental status is believed to be due to spread of melanoma cancer to the brain. Because of this, your care will be transferred [place] [facility] [place] based.      SECONDARY DISCHARGE DIAGNOSES  Diagnosis: Metastatic melanoma  Assessment and Plan of Treatment: You have a history of melanoma with spread to different parts of the body, including the brain. Your oncologist and neurosurgeon were contacted to share information. Imaging was taken of the spine and brain to evaluate for further spread of cancer. You were given a CD with images and reports of the Radiology images. Please share these with the physicians at [facility] continue your care.

## 2021-07-29 NOTE — PROGRESS NOTE ADULT - PROBLEM SELECTOR PLAN 1
Pt w/ hx of seizures and recent herpes zoster presented with AMS and reported left sided weakness. CT brain negative for acute bleed or infarct. Brain MRI poor quality due to motion, post surgical and radiation changes of right temporal lobe, no large mass, no recent infarct. Pt still with AMS, reportedly not as verbal as baseline. More likely due to brain metastasis, as infectious workup negative and no metabolic abnormalities.  - neuro epilepsy following: vEEG no seizures recorded  - LP 7/28: neg for infection, CSF PCR panel neg, HXV neg, VZV neg  - c/w keppra 1g IV q12h  - c/w Valproic acid 750mg IV q12h

## 2021-07-29 NOTE — PROGRESS NOTE ADULT - SUBJECTIVE AND OBJECTIVE BOX
incomplete INTERVAL EVENTS: This AM pt has recorded temp 100.4, given tylenol. BCx collected, CXR which no infiltrates, UA neg.    SUBJECTIVE / INTERVAL HPI: Patient seen this AM and examined at bedside. Pt has no complaints. Denies pain, fever, chills.     ROS: negative unless otherwise stated above.    VITAL SIGNS:  Vital Signs Last 24 Hrs  T(C): 38 (2021 05:38), Max: 38 (2021 05:38)  T(F): 100.4 (2021 05:38), Max: 100.4 (2021 05:38)  HR: 114 (2021 05:38) (114 - 122)  BP: 120/73 (2021 05:38) (103/70 - 152/105)  BP(mean): --  RR: 18 (2021 05:38) (16 - 18)  SpO2: 98% (2021 05:38) (97% - 98%)        PHYSICAL EXAM:    General: elderly woman, laying in bed, NAD, responsive to voice, follows some commands  HEENT: MMM  Neck: supple  Cardiovascular: +S1/S2; RRR; no M/R/G  Respiratory: CTA B/L; no W/R/R  Gastrointestinal: soft, NT/ND, hypoactive BS  Extremities: WWP; no edema, clubbing or cyanosis  Vascular: 2+ radial, DP pulses B/L  Neurological: AAOx1 (oriented to person)    MEDICATIONS:  MEDICATIONS  (STANDING):  dextrose 5% + sodium chloride 0.9%. 1000 milliLiter(s) (50 mL/Hr) IV Continuous <Continuous>  enoxaparin Injectable 40 milliGRAM(s) SubCutaneous every 24 hours  fentaNYL   Patch  12 MICROgram(s)/Hr 1 Patch Transdermal every 72 hours  gabapentin 100 milliGRAM(s) Oral three times a day  levETIRAcetam  IVPB 1000 milliGRAM(s) IV Intermittent every 12 hours  lidocaine   5% Patch 1 Patch Transdermal every 24 hours  melatonin 3 milliGRAM(s) Oral at bedtime  midodrine 10 milliGRAM(s) Oral every 8 hours  mirtazapine 15 milliGRAM(s) Oral at bedtime  senna 1 Tablet(s) Oral at bedtime  valproate sodium IVPB 750 milliGRAM(s) IV Intermittent every 12 hours    MEDICATIONS  (PRN):  acetaminophen   Tablet .. 650 milliGRAM(s) Oral every 6 hours PRN Temp greater or equal to 38.5C (101.3F), Mild Pain (1 - 3)  hydrocortisone 2.5% Ointment 1 Application(s) Topical three times a day PRN Itching  morphine  - Injectable 4 milliGRAM(s) IV Push every 4 hours PRN Severe Pain (7 - 10)      ALLERGIES:  Allergies    crawfish (Anaphylaxis)  Keflex (Anaphylaxis)  Kiwi (Anaphylaxis)  penicillin (Anaphylaxis)  tetracycline (Anaphylaxis)    Intolerances        LABS:                        10.4   8.07  )-----------( 171      ( 2021 06:06 )             34.6     -    138  |  111<H>  |  14  ----------------------------<  95  3.6   |  18<L>  |  0.72    Ca    9.8      2021 06:06  Phos  2.9       Mg     2.2         TPro  x   /  Alb  <L>  /  TBili  x   /  DBili  x   /  AST  x   /  ALT  x   /  AlkPhos  x         Urinalysis Basic - ( 2021 05:55 )    Color: Yellow / Appearance: Clear / S.015 / pH: x  Gluc: x / Ketone: 15 mg/dL  / Bili: Small / Urobili: 1.0 E.U./dL   Blood: x / Protein: 100 mg/dL / Nitrite: NEGATIVE   Leuk Esterase: NEGATIVE / RBC: 5-10 /HPF / WBC < 5 /HPF   Sq Epi: x / Non Sq Epi: 5-10 /HPF / Bacteria: Present /HPF      CAPILLARY BLOOD GLUCOSE          RADIOLOGY & ADDITIONAL TESTS: Reviewed.

## 2021-07-29 NOTE — CHART NOTE - NSCHARTNOTEFT_GEN_A_CORE
Febrile to 100.4. Told to do rectal temp. Given IV tylenol x1. No blood cultures required as team believes that the fevers are due to malignancy and not acute infection

## 2021-07-29 NOTE — DISCHARGE NOTE PROVIDER - NSDCMRMEDTOKEN_GEN_ALL_CORE_FT
amLODIPine 10 mg oral tablet: 1 tab(s) orally once a day  calcium carbonate 1 g oral tablet:   diphenhydrAMINE 25 mg oral capsule:   enoxaparin 30 mg/0.3 mL injectable solution: injectable 2 times a day  fentaNYL 75 mcg/hr transdermal film, extended release: 1 film(s) transdermal every 72 hours  gabapentin 100 mg oral capsule: 1 cap(s) orally 3 times a day  labetalol 100 mg oral tablet: 1 tab(s) orally 2 times a day  loperamide 1 mg/5 mL oral liquid: 10 milliliter(s) orally every 4 hours, As Needed  oxyCODONE 5 mg oral tablet: 2  orally every 4 hours, As Needed  Protonix 40 mg oral delayed release tablet: 1 tab(s) orally once a day  ramelteon 8 mg oral tablet: 1 tab(s) orally once a day (at bedtime)  Remeron 15 mg oral tablet: 1 tab(s) orally once a day (at bedtime)  Senna 8.6 mg oral tablet: 1 tab(s) orally once a day (at bedtime)   acetaminophen 325 mg oral tablet: 2 tab(s) orally every 6 hours, As needed, Temp greater or equal to 38.5C (101.3F), Mild Pain (1 - 3)  dexamethasone 6 mg/25 mL-NaCl 0.9% intravenous solution: 8.33 milliliter(s) intravenous every 12 hours  enoxaparin: 40 milligram(s) subcutaneous every 24 hours  fentaNYL 12 mcg/hr transdermal film, extended release: 1 patch transdermal every 72 hours  gabapentin 100 mg oral capsule: 1 cap(s) orally 3 times a day  glucose 40% oral gel: 1  orally , As Needed  glucose 50% intravenous solution: 50 milliliter(s) intravenous once  glucose 50% intravenous solution: 25 milliliter(s) intravenous once  glucose 50% intravenous solution: 50 milliliter(s) intravenous once  hydrocortisone 2.5% topical ointment: 1 application topically 3 times a day, As needed, Itching  insulin lispro 100 units/mL injectable solution: 2 unit(s) injectable 3 times a day (before meals), As Needed  levETIRAcetam 100 mg/mL intravenous solution: 10 milliliter(s) intravenous every 12 hours  lidocaine 5% topical film: Apply topically to affected area every 24 hours  melatonin 3 mg oral tablet: 1 tab(s) orally once a day (at bedtime)  midodrine 5 mg oral tablet: 1 tab(s) orally every 8 hours  morphine: 4 milligram(s) intravenous every 4 hours, As Needed  Remeron 15 mg oral tablet: 1 tab(s) orally once a day (at bedtime)  senna oral tablet: 1 tab(s) orally once a day (at bedtime)  valproic acid (as valproate sodium) 100 mg/mL intravenous solution: 7.5 milliliter(s) intravenous every 12 hours   acetaminophen 325 mg oral tablet: 2 tab(s) orally every 6 hours, As needed, Temp greater or equal to 38.5C (101.3F), Mild Pain (1 - 3)  acetaminophen 325 mg oral tablet: 2 tab(s) orally every 6 hours, As needed, Temp greater or equal to 38.5C (101.3F), Mild Pain (1 - 3)  dexamethasone 6 mg/25 mL-NaCl 0.9% intravenous solution: 8.33 milliliter(s) intravenous every 12 hours  enoxaparin: 40 milligram(s) subcutaneous every 24 hours  fentaNYL 12 mcg/hr transdermal film, extended release: 1 patch transdermal every 72 hours  gabapentin 100 mg oral capsule: 1 cap(s) orally 3 times a day  glucose 40% oral gel: 1  orally , As Needed  glucose 50% intravenous solution: 50 milliliter(s) intravenous once  glucose 50% intravenous solution: 25 milliliter(s) intravenous once  glucose 50% intravenous solution: 50 milliliter(s) intravenous once  hydrocortisone 2.5% topical ointment: 1 application topically 3 times a day, As needed, Itching  insulin lispro 100 units/mL injectable solution: 2 unit(s) injectable 3 times a day (before meals), As Needed  levETIRAcetam 100 mg/mL intravenous solution: 10 milliliter(s) intravenous every 12 hours  lidocaine 5% topical film: Apply topically to affected area every 24 hours  melatonin 3 mg oral tablet: 1 tab(s) orally once a day (at bedtime)  morphine: 4 milligram(s) intravenous every 4 hours, As Needed  Remeron 15 mg oral tablet: 1 tab(s) orally once a day (at bedtime)  senna oral tablet: 1 tab(s) orally once a day (at bedtime)  valproic acid (as valproate sodium) 100 mg/mL intravenous solution: 7.5 milliliter(s) intravenous every 12 hours   acetaminophen 325 mg oral tablet: 2 tab(s) orally every 6 hours, As needed, Temp greater or equal to 38.5C (101.3F), Mild Pain (1 - 3)  acetaminophen 325 mg oral tablet: 2 tab(s) orally every 6 hours, As needed, Temp greater or equal to 38.5C (101.3F), Mild Pain (1 - 3)  dexamethasone 6 mg/25 mL-NaCl 0.9% intravenous solution: 8.33 milliliter(s) intravenous every 12 hours  enoxaparin: 40 milligram(s) subcutaneous every 24 hours  fentaNYL 12 mcg/hr transdermal film, extended release: 1 patch transdermal every 72 hours  gabapentin 100 mg oral capsule: 1 cap(s) orally 3 times a day  glucose 40% oral gel: 1  orally , As Needed  glucose 50% intravenous solution: 50 milliliter(s) intravenous once  glucose 50% intravenous solution: 25 milliliter(s) intravenous once  glucose 50% intravenous solution: 50 milliliter(s) intravenous once  hydrocortisone 2.5% topical ointment: 1 application topically 3 times a day, As needed, Itching  insulin lispro 100 units/mL injectable solution: 2 unit(s) injectable 3 times a day (before meals), As Needed  levETIRAcetam 100 mg/mL intravenous solution: 10 milliliter(s) intravenous every 12 hours  lidocaine 5% topical film: Apply topically to affected area every 24 hours  melatonin 3 mg oral tablet: 1 tab(s) orally once a day (at bedtime)  morphine: 4 milligram(s) intravenous every 4 hours, As Needed  Remeron 15 mg oral tablet: 1 tab(s) orally once a day (at bedtime)  senna oral tablet: 1 tab(s) orally once a day (at bedtime)  valproic acid 250 mg/5 mL oral liquid: orally 2 times a day

## 2021-07-29 NOTE — CHART NOTE - NSCHARTNOTEFT_GEN_A_CORE
Febrile to 100.4F overnight. Tylenol given Febrile to 100.4F at 530AM. IV Tylenol given. Fever work up done. did not start vanc/zosyn given 100.4 low grade fever and otherwise HD stable. Patient also has other reasons for fevers including cancer. Will defer to morning teams decision on whether to start antibiotics. Febrile to 100.4F at 530AM. IV Tylenol given. Fever work up done. did not start vanc/zosyn given 100.4 low grade fever and otherwise HD stable. Pt has been tachycardic since admission. Patient also has other reasons for fevers including cancer. Will defer to morning teams decision on whether to start antibiotics.

## 2021-07-29 NOTE — PROGRESS NOTE ADULT - PROBLEM SELECTOR PLAN 5
Pt w/ hx of HTN. Home meds: amlodipine 5mg PO, labetalol 100mg PO BID  - holding home meds in setting of sepsis  - decrease midodrine to 5mg q8h PO, wean as tolerated

## 2021-07-29 NOTE — PROGRESS NOTE ADULT - ATTENDING COMMENTS
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  w/ subsequent seizures (on Keppra) S/P craniotomy x2 (July 2020, June 1st 2021)    Decreased level of functioning, cognition/alertness.    No evidence to support a VZV encephalitis, though many cases are seronegative/CSF negative and low risk to continue valacyclovir, particularly if condition suddenly worsens again after discontinuation.      Recommendations:  - Continue Keppra 1000mg Q12hrs  - Continue Depakote 750mg Q12hrs   - trial of dexamethasone 2mg bid, as per prior admissions and recommendations per [place].  May help pain and potentially improve any minimal edema in the CNS.   Family requesting transfer to [facility] where neurosurgery and rad-onc team is.

## 2021-07-29 NOTE — DISCHARGE NOTE PROVIDER - CARE PROVIDER_API CALL
Madelyn Landaverde  [facility] [place]  Phone: (   )    -  Fax: (   )    -  Follow Up Time:

## 2021-07-29 NOTE — PROGRESS NOTE ADULT - ATTENDING COMMENTS
agree with assessment and plan as documented by resident.   --patient's infectious work up has been negative  --fevers seem likely 2/2 central etiology from underlying metastatic disease  --discussed with daughter at bedside at length as well as outpatient Neurosurgery team @ [facility] - patient's case also discussed with oncologist at [facility]. daughter prefers mother be transferred to patient's own physicians -- plan to now transfer to [facility] to Dr. Salas's service (oncology). patient with overall poor prognosis  --tachycardia may be component of pain vs dehydration; will give IVF today ; continue pain control   --family refused doppler for LLE swelling - so theoretically tachycardia differential includes PE but patient maintaining Oxygen saturation well so less likely. has been sinus tachycardia ranging  since admission so may also be relative baseline with underlying disease burden and chronic pain  --will start dexamethasone 2mg today and give BID until patient has bed available at [facility] to help with minimal edema on imaging that may be contributing to lethargy and mentation

## 2021-07-29 NOTE — PROGRESS NOTE ADULT - PROBLEM SELECTOR PLAN 4
Pt w/ metastatic melanoma, with poor prognosis. Palliative consulted. Family understands meaning of prognosis but wishes to continue workup , as per Palliative.  - f/u Palliative recs and GOC  - Dr. Salas ([facility] oncology) open to being involved in GOC discussion  - c/w pain control with morphine, fentanyl patch, lidoderm, gabapentin  - Pt family expressed wishes to transfer to [facility] where Pt recently received care

## 2021-07-29 NOTE — GOALS OF CARE CONVERSATION - ADVANCED CARE PLANNING - CONVERSATION DETAILS
Family wishes to transfer pt to [facility] where she has received care. Primary team aware. Thank you for this consult. Palliative medicine will sign off.  Please reconsult if the patient's symptoms and/or goals of care change, need to be readdressed, or if patient is readmitted in the future.

## 2021-07-29 NOTE — PROGRESS NOTE ADULT - PROBLEM SELECTOR PLAN 2
Pt met criteria for severe sepsis following admission with unknown source of infection. WBC normal now. BCx 7/25 NG. Febrile 7/29, fever workup neg so far, more likely 2/2 malignancy than infection.   Persistently tachycardic w/ b/l LE edema (L>R?), no SOB, no obvious CP. EKG sinus tachycardia.  - obtain collateral from [facility] regarding pt baseline vitals  - consider PE workup (pt family reported that she had dopplers last week at [facility] and was neg, they did not want to do dopplers here)  - meropenem discontinued due to interaction w/ valproic acid (inc seizure risk) and l/s for community acquired bacterial meningitis (per ID recs)  - acyclovir discontinued as LP not indicative of infection and VZV/HSV neg  - f/u BCx 7/29

## 2021-07-29 NOTE — PROGRESS NOTE ADULT - ASSESSMENT
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  Patient is being admitted for monitoring and workup for reversible causes of AMS. Etiology is more likely 2/2 brain metastasis and post-brain surgery, less likely infectious. 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 Patient is being admitted for monitoring and workup for reversible causes of AMS. Given negative infectious workup thus far, etiology is more likely 2/2 melanoma brain metastasis.

## 2021-07-29 NOTE — PROGRESS NOTE ADULT - SUBJECTIVE AND OBJECTIVE BOX
Neurology Stroke Progress Note    INTERVAL HPI/OVERNIGHT EVENTS:  Patient seen and examined. Patient more awake than yesterday, answering questions verbally and holding daughters hand. Family attempting to facilitate transfer to [facility] where her neurology team is.     MEDICATIONS  (STANDING):  enoxaparin Injectable 40 milliGRAM(s) SubCutaneous every 24 hours  fentaNYL   Patch  12 MICROgram(s)/Hr 1 Patch Transdermal every 72 hours  gabapentin 100 milliGRAM(s) Oral three times a day  levETIRAcetam  IVPB 1000 milliGRAM(s) IV Intermittent every 12 hours  lidocaine   5% Patch 1 Patch Transdermal every 24 hours  melatonin 3 milliGRAM(s) Oral at bedtime  midodrine. 5 milliGRAM(s) Oral every 8 hours  mirtazapine 15 milliGRAM(s) Oral at bedtime  senna 1 Tablet(s) Oral at bedtime  valproate sodium IVPB 750 milliGRAM(s) IV Intermittent every 12 hours    MEDICATIONS  (PRN):  acetaminophen   Tablet .. 650 milliGRAM(s) Oral every 6 hours PRN Temp greater or equal to 38.5C (101.3F), Mild Pain (1 - 3)  hydrocortisone 2.5% Ointment 1 Application(s) Topical three times a day PRN Itching  morphine  - Injectable 4 milliGRAM(s) IV Push every 4 hours PRN Severe Pain (7 - 10)      Allergies    crawfish (Anaphylaxis)  Keflex (Anaphylaxis)  Kiwi (Anaphylaxis)  penicillin (Anaphylaxis)  tetracycline (Anaphylaxis)    Intolerances        Vital Signs Last 24 Hrs  T(C): 36.8 (2021 10:04), Max: 38 (2021 05:38)  T(F): 98.2 (2021 10:04), Max: 100.4 (2021 05:38)  HR: 105 (2021 10:04) (105 - 118)  BP: 95/53 (2021 10:04) (95/53 - 152/105)  BP(mean): --  RR: 18 (2021 10:04) (18 - 18)  SpO2: 99% (2021 10:04) (97% - 99%)    PHYSICAL EXAM:  Awake and easily arousable to verbal stimuli, and more communicative today. Able to state name, but thinks she is at [facility] and it is 2019.   PERRLA 3mm brisk, EOMI, no nystagmus. No facial weakness appreciated  B/L LE 3+ pitting edema w RLE erythema and warm to touch  Upper extremities moves to command, at least 3/5  Bilateral LE moves minimally w/ gross toe wiggle  1+ B/L UE, absent in LE and toes mu      LABS:                        9.8    7.29  )-----------( 179      ( 2021 09:04 )             31.6         140  |  114<H>  |  12  ----------------------------<  104<H>  3.2<L>   |  18<L>  |  0.62    Ca    9.8      2021 09:04  Phos  2.9       Mg     2.0         TPro  x   /  Alb  1947<L>  /  TBili  x   /  DBili  x   /  AST  x   /  ALT  x   /  AlkPhos  x         Urinalysis Basic - ( 2021 05:55 )    Color: Yellow / Appearance: Clear / S.015 / pH: x  Gluc: x / Ketone: 15 mg/dL  / Bili: Small / Urobili: 1.0 E.U./dL   Blood: x / Protein: 100 mg/dL / Nitrite: NEGATIVE   Leuk Esterase: NEGATIVE / RBC: 5-10 /HPF / WBC < 5 /HPF   Sq Epi: x / Non Sq Epi: 5-10 /HPF / Bacteria: Present /HPF     Neurology Stroke Progress Note    INTERVAL HPI/OVERNIGHT EVENTS:  Patient seen and examined. Patient more awake than yesterday, answering questions verbally and holding daughters hand.   MRI showed multiple metastatic lesions in the paraspinal muscles and T12 dural lesion, which appears worse when compared to the report the daughter shared from [facility] in May.  Pain still an issue.  Family attempting to facilitate transfer to [facility] where her neurosurgery team is.     MEDICATIONS  (STANDING):  enoxaparin Injectable 40 milliGRAM(s) SubCutaneous every 24 hours  fentaNYL   Patch  12 MICROgram(s)/Hr 1 Patch Transdermal every 72 hours  gabapentin 100 milliGRAM(s) Oral three times a day  levETIRAcetam  IVPB 1000 milliGRAM(s) IV Intermittent every 12 hours  lidocaine   5% Patch 1 Patch Transdermal every 24 hours  melatonin 3 milliGRAM(s) Oral at bedtime  midodrine. 5 milliGRAM(s) Oral every 8 hours  mirtazapine 15 milliGRAM(s) Oral at bedtime  senna 1 Tablet(s) Oral at bedtime  valproate sodium IVPB 750 milliGRAM(s) IV Intermittent every 12 hours    MEDICATIONS  (PRN):  acetaminophen   Tablet .. 650 milliGRAM(s) Oral every 6 hours PRN Temp greater or equal to 38.5C (101.3F), Mild Pain (1 - 3)  hydrocortisone 2.5% Ointment 1 Application(s) Topical three times a day PRN Itching  morphine  - Injectable 4 milliGRAM(s) IV Push every 4 hours PRN Severe Pain (7 - 10)      Allergies    crawfish (Anaphylaxis)  Keflex (Anaphylaxis)  Kiwi (Anaphylaxis)  penicillin (Anaphylaxis)  tetracycline (Anaphylaxis)    Intolerances        Vital Signs Last 24 Hrs  T(C): 36.8 (2021 10:04), Max: 38 (2021 05:38)  T(F): 98.2 (2021 10:04), Max: 100.4 (2021 05:38)  HR: 105 (2021 10:04) (105 - 118)  BP: 95/53 (2021 10:04) (95/53 - 152/105)  BP(mean): --  RR: 18 (2021 10:04) (18 - 18)  SpO2: 99% (2021 10:04) (97% - 99%)    PHYSICAL EXAM:  Awake and easily arousable to verbal stimuli, and more communicative today. Able to state name, but thinks she is at [facility] and it is 2019.   PERRLA 3mm brisk, EOMI, no nystagmus. No facial weakness appreciated  B/L LE 3+ pitting edema w RLE erythema and warm to touch  Upper extremities moves to command, at least 3/5  Bilateral LE moves minimally w/ gross toe wiggle  1+ B/L UE, absent in LE and toes mu      LABS:                        9.8    7.29  )-----------( 179      ( 2021 09:04 )             31.6         140  |  114<H>  |  12  ----------------------------<  104<H>  3.2<L>   |  18<L>  |  0.62    Ca    9.8      2021 09:04  Phos  2.9       Mg     2.0         TPro  x   /  Alb  1947<L>  /  TBili  x   /  DBili  x   /  AST  x   /  ALT  x   /  AlkPhos  x         Urinalysis Basic - ( 2021 05:55 )    Color: Yellow / Appearance: Clear / S.015 / pH: x  Gluc: x / Ketone: 15 mg/dL  / Bili: Small / Urobili: 1.0 E.U./dL   Blood: x / Protein: 100 mg/dL / Nitrite: NEGATIVE   Leuk Esterase: NEGATIVE / RBC: 5-10 /HPF / WBC < 5 /HPF   Sq Epi: x / Non Sq Epi: 5-10 /HPF / Bacteria: Present /HPF

## 2021-07-29 NOTE — PROGRESS NOTE ADULT - PROBLEM SELECTOR PLAN 6
Pt with RVP swab that was positive for coronavirus (non-SARS, non-COVID-19). Pt with nonlabored breathing, O2 [id number].  - c/w supportive care

## 2021-07-29 NOTE — DISCHARGE NOTE PROVIDER - PROVIDER TOKENS
FREE:[LAST:[Akhil],FIRST:[Madelyn],PHONE:[(   )    -],FAX:[phone]   )    -],ADDRESS:[[facility]]]

## 2021-07-29 NOTE — DISCHARGE NOTE PROVIDER - HOSPITAL COURSE
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 Stroke code was called on presentation and as per neurology, valproic acid and keppra was started with suspicion for seizure. CTH with no acute findings of infarct or bleed. UA was negative. vEEG placed but no seizures detected. Given recent brain mets, pt's outpatient doctors had decreased DVT tx from lovenox BID to 40mg QD. Palliative team was consulted to discuss goals of care and they also recommending pain regimen; Morphine, fentanyl patch, and lidocaine patch started for cancer-related pain. ID was consulted, acyclovir started for possible VZV encephalitis, but discontinued as LP was negative for infection and HSV/VZV PCR negative. RVP positive for non-SARS, non-covid coronavirus; supportive care, no specific treatment needed, patient breathing normally with good oxygenation, no infiltrates on CXR. No metabolitc abnormalities. Infectious workup negative. AMS appears most likely secondary to metastatic melanoma.      #Altered mental status.  Plan: Pt w/ hx of seizures and recent herpes zoster presented with AMS and reported left sided weakness. CT brain negative for acute bleed or infarct. Brain MRI poor quality due to motion, post surgical and radiation changes of right temporal lobe, no large mass, no recent infarct. Pt still with AMS, reportedly not as verbal as baseline. More likely due to brain metastasis, as infectious workup negative and no metabolic abnormalities.  - neuro epilepsy following: vEEG no seizures recorded  - LP 7/28: neg for infection, CSF PCR panel neg, HXV neg, VZV neg  - c/w keppra 1g IV q12h  - c/w Valproic acid 750mg IV q12h.     #Severe sepsis.  Plan: Pt met criteria for severe sepsis following admission with unknown source of infection. WBC normal now. BCx 7/25 NG. Febrile 7/29, fever workup neg so far, more likely 2/2 malignancy than infection.   Persistently tachycardic w/ b/l LE edema, no SOB, no obvious CP. EKG sinus tachycardia.  - obtain collateral from [facility] regarding pt baseline vitals  - consider PE workup (pt family reported that she had dopplers last week at [facility] and was neg, they did not want to do dopplers here)  - meropenem discontinued due to interaction w/ valproic acid (inc seizure risk) and low suspicion for community acquired bacterial meningitis (per ID recs)  - acyclovir discontinued as LP not indicative of infection and VZV/HSV neg  - f/u BCx 7/29    #Metastatic melanoma.  Plan: Pt w/ metastatic melanoma with metastases at brain/spine/multiple organs. Chronic back pain 2/2 spinal mets. Poor prognosis.   - Dr. Salas open to being involved in discussion with GOC.  - Palliative consulted  - c/w fentanyl patch  - c/w morphine 4mg IV q4 PRN for severe pain  - cw home gabapentin 100 mg PO BID  - Brain MRI: poor quality, no signs of additional mets, no sign of swelling.   - Spine MRI: 1.0 cm intradural mass at T12, may be metastases. Additional bony metastases of L1 and multiple intramuscular mets within the lumbar spine.     #Goals of care, counseling/discussion.  Plan: Pt w/ metastatic melanoma, with poor prognosis. Palliative consulted. Family understands meaning of prognosis but wishes to continue workup , as per Palliative.  - f/u Palliative recs and GOC  - Dr. Salas ([facility] oncology) open to being involved in GOC discussion  - c/w pain control with morphine, fentanyl patch, lidoderm, gabapentin  - Pt family expressed wishes to transfer to [facility] where Pt recently received care    #Hypertension.  Plan: Pt w/ hx of HTN. Home meds: amlodipine 5mg PO, labetalol 100mg PO BID  - holding home meds in setting of sepsis  - decrease midodrine to 5mg q8h PO, wean as tolerated.     #Coronavirus infection. Plan: Pt with RVP swab that was positive for coronavirus (non-SARS, non-COVID-19). Pt with nonlabored breathing, O2 [id number].  - c/w supportive care.    #History of seizures.  Plan: Pt w/ hx of seizures.   - Neuro epilepsy following  - vEEG no evidence of seizures  - c/w keppra 1g IV q12h  - c/w valproate 500mg IV q12h.     #Herpes zoster ophthalmicus.  Plan: Pt w/ hx of herpes zoster infection. VZV PCR neg.  - acyclovir discontinued    #Pulmonary embolism.  Plan: Pt w/ recent hx of PE. 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), chronic back pain 2/2 mets, who was brought into to the ED with concerns of AMS, left sided weakness and left side facial droop. Stroke code was called on presentation and as per neurology, valproic acid and keppra was started with suspicion for seizure. CTH with no acute findings of infarct or bleed. UA was negative. vEEG placed but no seizures detected. Given recent brain mets, pt's outpatient doctors had decreased DVT tx from lovenox BID to 40mg QD. Patient hypotensive and started on midodrine. Palliative team was consulted to discuss goals of care and they also recommending pain regimen; Morphine, fentanyl patch, and lidocaine patch started for cancer-related pain. ID was consulted, acyclovir started for possible VZV encephalitis, but discontinued as LP was negative for infection and HSV/VZV PCR negative. RVP positive for non-SARS, non-covid coronavirus; supportive care, no specific treatment needed, patient breathing normally with good oxygenation, no infiltrates on CXR. No metabolitc abnormalities. Infectious workup negative. AMS appears most likely secondary to metastatic melanoma.      #Altered mental status.  Plan: Pt w/ hx of seizures and recent herpes zoster presented with AMS and reported left sided weakness. CT brain negative for acute bleed or infarct. Brain MRI poor quality due to motion, post surgical and radiation changes of right temporal lobe, no large mass, no recent infarct. Pt still with AMS, reportedly not as verbal as baseline. More likely due to brain metastasis, as infectious workup negative and no metabolic abnormalities.  - neuro epilepsy following: vEEG no seizures recorded  - LP 7/28: neg for infection, CSF PCR panel neg, HXV neg, VZV neg  - c/w keppra 1g IV q12h  - c/w Valproic acid 750mg IV q12h.     #Severe sepsis.  Plan: Pt met criteria for severe sepsis following admission with unknown source of infection. WBC normal now. BCx 7/25 NG. Febrile 7/29, fever workup neg so far, more likely 2/2 malignancy than infection.   Persistently tachycardic w/ b/l LE edema, no SOB, no obvious CP. EKG sinus tachycardia.  - obtain collateral from [facility] regarding pt baseline vitals  - consider PE workup (pt family reported that she had dopplers last week at [facility] and was neg, they did not want to do dopplers here)  - meropenem discontinued due to interaction w/ valproic acid (inc seizure risk) and low suspicion for community acquired bacterial meningitis (per ID recs)  - acyclovir discontinued as LP not indicative of infection and VZV/HSV neg  - f/u BCx 7/29    #Metastatic melanoma.  Plan: Pt w/ metastatic melanoma with metastases at brain/spine/multiple organs. Chronic back pain 2/2 spinal mets. Poor prognosis.   - Dr. Salas open to being involved in discussion with GOC.  - Palliative consulted  - c/w fentanyl patch  - c/w morphine 4mg IV q4 PRN for severe pain  - cw home gabapentin 100 mg PO BID  - Brain MRI: poor quality, no signs of additional mets, no sign of swelling.   - Spine MRI: 1.0 cm intradural mass at T12, may be metastases. Additional bony metastases of L1 and multiple intramuscular mets within the lumbar spine.     #Goals of care, counseling/discussion.  Plan: Pt w/ metastatic melanoma, with poor prognosis. Palliative consulted. Family understands meaning of prognosis but wishes to continue workup , as per Palliative.  - f/u Palliative recs and GOC  - Dr. Salas ([facility] oncology) open to being involved in GOC discussion  - c/w pain control with morphine, fentanyl patch, lidoderm, gabapentin  - Pt family expressed wishes to transfer to [facility] where Pt recently received care    #Hypertension.  Plan: Pt w/ hx of HTN. Home meds: amlodipine 5mg PO, labetalol 100mg PO BID  - holding home meds in setting of sepsis  - decrease midodrine to 5mg q8h PO, wean as tolerated.     #Coronavirus infection. Plan: Pt with RVP swab that was positive for coronavirus (non-SARS, non-COVID-19). Pt with nonlabored breathing, O2 [id number].  - c/w supportive care.    #History of seizures.  Plan: Pt w/ hx of seizures.   - Neuro epilepsy following  - vEEG no evidence of seizures  - c/w keppra 1g IV q12h  - c/w valproate 500mg IV q12h.     #Herpes zoster ophthalmicus.  Plan: Pt w/ hx of herpes zoster infection. VZV PCR neg.  - acyclovir discontinued    #Pulmonary embolism.  Plan: Pt w/ recent hx of PE. 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 Stroke code was called on presentation and as per neurology, valproic acid and keppra was started with suspicion for seizure. CTH with no acute findings of infarct or bleed. UA was negative (although VRE grew on UCx, believed to be colonization so treatment not needed). vEEG placed but no seizures detected. Given recent brain mets, pt's outpatient doctors had decreased DVT tx from lovenox BID to 40mg QD. Patient hypotensive and started on midodrine. Palliative team was consulted to discuss goals of care and they also recommending pain regimen; Morphine, fentanyl patch, and lidocaine patch started for cancer-related pain. ID was consulted, acyclovir started for possible VZV encephalitis, but discontinued as LP was negative for infection and HSV/VZV PCR negative. RVP positive for non-SARS, non-covid coronavirus; supportive care, no specific treatment needed, patient breathing normally with good oxygenation, no infiltrates on CXR. No metabolitc abnormalities. Infectious workup negative. AMS appears most likely secondary to metastatic melanoma.      #Altered mental status.  Plan: Pt w/ hx of seizures and recent herpes zoster presented with AMS and reported left sided weakness. CT brain negative for acute bleed or infarct. Brain MRI poor quality due to motion, post surgical and radiation changes of right temporal lobe, no large mass, no recent infarct. Pt still with AMS, reportedly not as verbal as baseline. More likely due to brain metastasis, as infectious workup negative and no metabolic abnormalities.  - neuro epilepsy following: vEEG no seizures recorded  - LP 7/28: neg for infection, CSF PCR panel neg, HSV neg, VZV neg  - c/w keppra 1g IV q12h  - c/w Valproic acid 750mg IV q12h.     #Severe sepsis.  Plan: Pt met criteria for severe sepsis following admission with unknown source of infection. WBC normal now. BCx 7/25 NG. Febrile 7/29, fever workup neg so far, more likely 2/2 malignancy than infection.   Persistently tachycardic w/ b/l LE edema, no SOB, no obvious CP. EKG sinus tachycardia.  - obtain collateral from [facility] regarding pt baseline vitals  - consider PE workup (pt family reported that she had dopplers last week at [facility] and was neg, they did not want to do dopplers here)  - meropenem discontinued due to interaction w/ valproic acid (inc seizure risk) and low suspicion for community acquired bacterial meningitis (per ID recs)  - acyclovir discontinued as LP not indicative of infection and VZV/HSV neg  - f/u BCx 7/29    #Metastatic melanoma.  Plan: Pt w/ metastatic melanoma with metastases at brain/spine/multiple organs. Chronic back pain 2/2 spinal mets. Poor prognosis.   - Palliative consulted  - c/w fentanyl patch  - c/w morphine 4mg IV q4 PRN for severe pain  - cw home gabapentin 100 mg PO BID  - Brain MRI: poor quality, no signs of additional mets, no sign of swelling.   - Spine MRI: 1.0 cm intradural mass at T12, may be metastases. Additional bony metastases of L1 and multiple intramuscular mets within the lumbar spine.     #Goals of care, counseling/discussion.  Plan: Pt w/ metastatic melanoma, with poor prognosis. Palliative consulted. Family understands meaning of prognosis but wishes to continue workup , as per Palliative.  - Dr. Salas (oncologist) contacted  - c/w pain control with morphine, fentanyl patch, lidoderm, gabapentin  - Pt family expressed wishes to transfer to [facility] where Pt recently received care    #Hypertension.  Plan: Pt w/ hx of HTN. Home meds: amlodipine 5mg PO, labetalol 100mg PO BID  - holding home meds in setting of sepsis  - decrease midodrine to 5mg q8h PO, wean as tolerated.     #Coronavirus infection. Plan: Pt with RVP swab that was positive for coronavirus (non-SARS, non-COVID-19). Pt with nonlabored breathing, O2 [id number].  - c/w supportive care.    #History of seizures.  Plan: Pt w/ hx of seizures.   - Neuro epilepsy following  - vEEG no evidence of seizures  - c/w keppra 1g IV q12h  - c/w valproate 500mg IV q12h.     #Herpes zoster ophthalmicus.  Plan: Pt w/ hx of herpes zoster infection. VZV PCR neg.  - acyclovir discontinued    #Pulmonary embolism.  Plan: Pt w/ recent hx of PE. 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 Stroke code was called on presentation and as per neurology, valproic acid and keppra was started with suspicion for seizure. CTH with no acute findings of infarct or bleed. UA was negative (although VRE grew on UCx, believed to be colonization so treatment not needed). vEEG placed but no seizures detected. Given recent brain mets, pt's outpatient doctors had decreased DVT tx from lovenox BID to 40mg QD. Patient hypotensive and started on midodrine. Palliative team was consulted to discuss goals of care and they also recommending pain regimen; Morphine, fentanyl patch, and lidocaine patch started for cancer-related pain. ID was consulted, acyclovir started for possible VZV encephalitis, but discontinued as LP was negative for infection and HSV/VZV PCR negative. RVP positive for non-SARS, non-covid coronavirus; supportive care, no specific treatment needed, patient breathing normally with good oxygenation, no infiltrates on CXR. No metabolitc abnormalities. Infectious workup negative. AMS appears most likely secondary to metastatic melanoma.      #Altered mental status.  Plan: Pt w/ hx of seizures and recent herpes zoster presented with AMS and reported left sided weakness. CT brain negative for acute bleed or infarct. Brain MRI poor quality due to motion, post surgical and radiation changes of right temporal lobe, no large mass, no recent infarct. Pt still with AMS, reportedly not as verbal as baseline. More likely due to brain metastasis, as infectious workup negative and no metabolic abnormalities.  - neuro epilepsy following: vEEG no seizures recorded  - LP 7/28: neg for infection, CSF PCR panel neg, HSV neg, VZV neg  - c/w keppra 1g IV q12h  - c/w Valproic acid 750mg IV q12h.     #Severe sepsis.  Plan: Pt met criteria for severe sepsis following admission with unknown source of infection. WBC normal now. BCx 7/25 NG. Febrile 7/29, fever workup neg so far, more likely 2/2 malignancy than infection.   Persistently tachycardic w/ b/l LE edema, no SOB, no obvious CP. EKG sinus tachycardia.  - meropenem discontinued due to interaction w/ valproic acid (inc seizure risk) and low suspicion for community acquired bacterial meningitis (per ID recs)  - acyclovir discontinued as LP not indicative of infection and VZV/HSV neg  - f/u BCx 7/29    #Metastatic melanoma.  Plan: Pt w/ metastatic melanoma with metastases at brain/spine/multiple organs. Chronic back pain 2/2 spinal mets. Poor prognosis.   - Palliative consulted  - c/w fentanyl patch  - c/w morphine 4mg IV q4 PRN for severe pain  - cw home gabapentin 100 mg PO BID  - Brain MRI: poor quality, no signs of additional mets, no sign of swelling.   - Spine MRI: 1.0 cm intradural mass at T12, may be metastases. Additional bony metastases of L1 and multiple intramuscular mets within the lumbar spine.     #Goals of care, counseling/discussion.  Plan: Pt w/ metastatic melanoma, with poor prognosis. Palliative consulted. Family understands meaning of prognosis but wishes to continue workup , as per Palliative.  - c/w pain control with morphine, fentanyl patch, lidoderm, gabapentin    #Hypertension.  Plan: Pt w/ hx of HTN. Home meds: amlodipine 5mg PO, labetalol 100mg PO BID  - holding home meds in setting of sepsis  - decrease midodrine to 5mg q8h PO, wean as tolerated.     #Coronavirus infection. Plan: Pt with RVP swab that was positive for coronavirus (non-SARS, non-COVID-19). Pt with nonlabored breathing, O2 [id number].  - c/w supportive care.    #History of seizures.  Plan: Pt w/ hx of seizures.   - Neuro epilepsy following  - vEEG no evidence of seizures  - c/w keppra 1g IV q12h  - c/w valproate 500mg IV q12h.     #Herpes zoster ophthalmicus.  Plan: Pt w/ hx of herpes zoster infection. VZV PCR neg.  - acyclovir discontinued    #Pulmonary embolism.  Plan: Pt w/ recent hx of PE. Home med: lovenox 40mg subq  - c/w lovenox    In light of patient's family wishes to transfer care to [facility] [place] and given that Pt's AMS is likely due to progression of metastatic melanoma to the brain, pt will be transferred to [facility] [place] under Dr. Landaverde. 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 Stroke code was called on presentation and as per neurology, valproic acid and keppra was started with suspicion for seizure. CTH with no acute findings of infarct or bleed. UA was negative (although VRE grew on UCx, believed to be colonization so treatment not needed). vEEG placed but no seizures detected. Given recent brain mets, pt's outpatient doctors had decreased DVT tx from lovenox BID to 40mg QD. Patient hypotensive and started on midodrine. Palliative team was consulted to discuss goals of care and they also recommending pain regimen; Morphine, fentanyl patch, and lidocaine patch started for cancer-related pain. ID was consulted, acyclovir started for possible VZV encephalitis, but discontinued as LP was negative for infection and HSV/VZV PCR negative. RVP positive for non-SARS, non-covid coronavirus; supportive care, no specific treatment needed, patient breathing normally with good oxygenation, no infiltrates on CXR. No metabolitc abnormalities. Infectious workup negative. AMS appears most likely secondary to metastatic melanoma.      #Altered mental status.  Plan: Pt w/ hx of seizures and recent herpes zoster presented with AMS and reported left sided weakness. CT brain negative for acute bleed or infarct. Brain MRI poor quality due to motion, post surgical and radiation changes of right temporal lobe, no large mass, no recent infarct. Pt still with AMS, reportedly not as verbal as baseline. More likely due to brain metastasis, as infectious workup negative and no metabolic abnormalities.  - neuro epilepsy following: vEEG no seizures recorded  - LP 7/28: neg for infection, CSF PCR panel neg, HSV neg, VZV neg  - c/w keppra 1g IV q12h  - c/w Valproic acid 750mg IV q12h.     #Severe sepsis.  Plan: Pt met criteria for severe sepsis following admission with unknown source of infection. WBC normal now. BCx 7/25 NG. Febrile 7/29, fever workup neg so far, more likely 2/2 malignancy than infection.   Persistently tachycardic w/ b/l LE edema, no SOB, no obvious CP. EKG sinus tachycardia.  - meropenem discontinued due to interaction w/ valproic acid (inc seizure risk) and low suspicion for community acquired bacterial meningitis (per ID recs)  - acyclovir discontinued as LP not indicative of infection and VZV/HSV neg  - f/u BCx 7/29    #Metastatic melanoma.  Plan: Pt w/ metastatic melanoma with metastases at brain/spine/multiple organs. Chronic back pain 2/2 spinal mets. Poor prognosis.   - Palliative consulted  - c/w fentanyl patch  - c/w morphine 4mg IV q4 PRN for severe pain  - cw home gabapentin 100 mg PO BID  - Brain MRI: poor quality, no signs of additional mets, no sign of swelling.   - Spine MRI: 1.0 cm intradural mass at T12, may be metastases. Additional bony metastases of L1 and multiple intramuscular mets within the lumbar spine.     #Goals of care, counseling/discussion.  Plan: Pt w/ metastatic melanoma, with poor prognosis. Palliative consulted. Family understands meaning of prognosis but wishes to continue workup , as per Palliative.  - c/w pain control with morphine, fentanyl patch, lidoderm, gabapentin    #Hypertension.  Plan: Pt w/ hx of HTN. Home meds: amlodipine 5mg PO, labetalol 100mg PO BID  - holding home meds in setting of sepsis  - decrease midodrine to 5mg q8h PO, wean as tolerated.     #Coronavirus infection. Plan: Pt with RVP swab that was positive for coronavirus (non-SARS, non-COVID-19). Pt with nonlabored breathing, O2 [id number].  - c/w supportive care.    #History of seizures.  Plan: Pt w/ hx of seizures.   - Neuro epilepsy following  - vEEG no evidence of seizures  - c/w keppra 1g IV q12h  - c/w valproate 500mg IV q12h.     #Herpes zoster ophthalmicus.  Plan: Pt w/ hx of herpes zoster infection. VZV PCR neg.  - acyclovir discontinued    #Pulmonary embolism.  Plan: Pt w/ recent hx of PE. Home med: lovenox 40mg subq  - c/w lovenox    In light of patient's family wishes to [place] [facility] [place] and given that Pt's AMS is likely due to progression of metastatic melanoma to the brain, pt will be transferred to [facility] [place]. Landaverde. 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 Stroke code was called on presentation and as per neurology, valproic acid and keppra was started with suspicion for seizure. CTH with no acute findings of infarct or bleed. UA was negative (although VRE grew on UCx, believed to be colonization so treatment not needed). vEEG placed but no seizures detected. Given recent brain mets, pt's outpatient doctors had decreased DVT tx from lovenox BID to 40mg QD. Patient hypotensive and started on midodrine. Palliative team was consulted to discuss goals of care and they also recommending pain regimen; Morphine, fentanyl patch, and lidocaine patch started for cancer-related pain. ID was consulted, acyclovir started for possible VZV encephalitis, but discontinued as LP was negative for infection and HSV/VZV PCR negative. RVP positive for non-SARS, non-covid coronavirus; supportive care, no specific treatment needed, patient breathing normally with good oxygenation, no infiltrates on CXR. No metabolitc abnormalities. Infectious workup negative. AMS appears most likely secondary to metastatic melanoma.    #Altered mental status.  Plan: Pt w/ hx of seizures and recent herpes zoster presented with AMS and reported left sided weakness. CT brain negative for acute bleed or infarct. Brain MRI poor quality due to motion, post surgical and radiation changes of right temporal lobe, no large mass, no recent infarct. Pt still with AMS, reportedly not as verbal as baseline. More likely due to brain metastasis, as infectious workup negative and no metabolic abnormalities.  Video EEG was monitored and no seizures were recorded. She had a Lumbar puncture on 7/28 which was negative for infection, CSF PCR panel-neg, HSV-neg, VZV-neg. pt was on Keppra 1g IV q12h and Valproic acid 750mg IV q12h during her admission.     #Severe sepsis: Pt met criteria for severe sepsis following admission with unknown source of infection. Febrile 7/29, fever workup neg so far, more likely 2/2 malignancy than infection. Pt was persistently tachycardic w/ b/l LE edema, no SOB, no obvious CP. EKG sinus tachycardia. Meropenem was discontinued due to interaction w/ valproic acid (inc seizure risk) and low suspicion for community acquired bacterial meningitis (per ID recs).    WBC normal now (8.7). BCx showed no growth at 3 days 7/29.     #Metastatic melanoma: Pt presented with metastatic melanoma with metastases at brain/spine/multiple organs. Chronic back pain 2/2 spinal mets. Poor prognosis.   For pain she was given fentanyl patch, morphine 4mg IV q4 PRN for severe pain, gabapentin 100 mg PO BID. Her brain MRI: poor quality, no signs of additional mets, no sign of swelling. Her Spine MRI showed: 1.0 cm intradural mass at T12, may be metastases. Additional bony metastases of L1 and multiple intramuscular mets within the lumbar spine.     #Goals of care, counseling/discussion: Pt presented with metastatic melanoma, with poor prognosis. Palliative was consulted. Family understands meaning of prognosis but wishes to continue workup. We controlled her pain with Morphine, fentanyl patch, lidoderm, gabapentin.    #Hypertension: Pt presented with a hx of HTN. She takes amlodipine 5mg PO and labetalol 100mg PO BID at home. We held her home meds in the setting of sepsis and decreased her midodrine to 5mg q8h PO as tolerated.     #Coronavirus infection: Pts RVP swab resulted positive for coronavirus (non-SARS, non-COVID-19). Pt with nonlabored breathing, O2 sat 98RA. Supportive care was given.    #History of seizures: Pt has a hx of seizures, Neuro epilepsy was following throughout her visit. Video EEG showed no evidence of seizures we continued with her home Keppra 1g IV q12h and Valproate 500mg IV q12h.     #Herpes zoster ophthalmicus: Pt had a past medical history of herpes zoster infection. Her VZV PCR was found to be negative and therefore we discontinued acyclovir.     #Pulmonary embolism: Pt presented with a recent history of PE. Her home medication is Lovenox 40mg subq and we continued that medication throughout her admission.     Patient was discharged to: [facility]: In light of patient's family wishes to transfer care to [facility] [place] and given that Pt's AMS is likely due to progression of metastatic melanoma to the brain, pt will be transferred to [facility] under Dr. Landaverde.    Physical exam at the time of discharge:  PHYSICAL EXAM:  Constitutional: Well nourished  female, resting comfortably, no acute distress  HEENT: PERRL, sclera non-icteric, no lymphadenopathy, no JVD, dry mucous membranes  Respiratory: clear to auscultation bilaterally, no wheezing, without accessory muscle use  Cardiovascular: RRR, normal S1S2, no M/R/G  Gastrointestinal: soft, NTND, no masses palpable, BS normal  Extremities: Warm, well perfused, radial pulses equal bilaterally, no edema  Neurological: AAOx1 to person, no FND  Skin: Normal temperature, warm, dry

## 2021-07-30 LAB
A1C WITH ESTIMATED AVERAGE GLUCOSE RESULT: 5.5 % — SIGNIFICANT CHANGE UP (ref 4–5.6)
ANION GAP SERPL CALC-SCNC: 12 MMOL/L — SIGNIFICANT CHANGE UP (ref 5–17)
ANISOCYTOSIS BLD QL: SLIGHT — SIGNIFICANT CHANGE UP
BASOPHILS # BLD AUTO: 0 K/UL — SIGNIFICANT CHANGE UP (ref 0–0.2)
BASOPHILS NFR BLD AUTO: 0 % — SIGNIFICANT CHANGE UP (ref 0–2)
BUN SERPL-MCNC: 13 MG/DL — SIGNIFICANT CHANGE UP (ref 7–23)
CALCIUM SERPL-MCNC: 9.8 MG/DL — SIGNIFICANT CHANGE UP (ref 8.4–10.5)
CHLORIDE SERPL-SCNC: 116 MMOL/L — HIGH (ref 96–108)
CO2 SERPL-SCNC: 15 MMOL/L — LOW (ref 22–31)
CREAT SERPL-MCNC: 0.59 MG/DL — SIGNIFICANT CHANGE UP (ref 0.5–1.3)
CULTURE RESULTS: SIGNIFICANT CHANGE UP
DACRYOCYTES BLD QL SMEAR: SLIGHT — SIGNIFICANT CHANGE UP
EOSINOPHIL # BLD AUTO: 0 K/UL — SIGNIFICANT CHANGE UP (ref 0–0.5)
EOSINOPHIL NFR BLD AUTO: 0 % — SIGNIFICANT CHANGE UP (ref 0–6)
ESTIMATED AVERAGE GLUCOSE: 111 MG/DL — SIGNIFICANT CHANGE UP (ref 68–114)
GLUCOSE BLDC GLUCOMTR-MCNC: 107 MG/DL — HIGH (ref 70–99)
GLUCOSE BLDC GLUCOMTR-MCNC: 111 MG/DL — HIGH (ref 70–99)
GLUCOSE BLDC GLUCOMTR-MCNC: 114 MG/DL — HIGH (ref 70–99)
GLUCOSE BLDC GLUCOMTR-MCNC: 114 MG/DL — HIGH (ref 70–99)
GLUCOSE SERPL-MCNC: 107 MG/DL — HIGH (ref 70–99)
HCT VFR BLD CALC: 33.6 % — LOW (ref 34.5–45)
HGB BLD-MCNC: 9.7 G/DL — LOW (ref 11.5–15.5)
LYMPHOCYTES # BLD AUTO: 0.57 K/UL — LOW (ref 1–3.3)
LYMPHOCYTES # BLD AUTO: 6.1 % — LOW (ref 13–44)
MACROCYTES BLD QL: SLIGHT — SIGNIFICANT CHANGE UP
MAGNESIUM SERPL-MCNC: 2.3 MG/DL — SIGNIFICANT CHANGE UP (ref 1.6–2.6)
MANUAL SMEAR VERIFICATION: SIGNIFICANT CHANGE UP
MCHC RBC-ENTMCNC: 28.9 GM/DL — LOW (ref 32–36)
MCHC RBC-ENTMCNC: 29.7 PG — SIGNIFICANT CHANGE UP (ref 27–34)
MCV RBC AUTO: 102.8 FL — HIGH (ref 80–100)
MONOCYTES # BLD AUTO: 0.33 K/UL — SIGNIFICANT CHANGE UP (ref 0–0.9)
MONOCYTES NFR BLD AUTO: 3.5 % — SIGNIFICANT CHANGE UP (ref 2–14)
NEUTROPHILS # BLD AUTO: 8.43 K/UL — HIGH (ref 1.8–7.4)
NEUTROPHILS NFR BLD AUTO: 88.7 % — HIGH (ref 43–77)
NEUTS BAND # BLD: 0.8 % — SIGNIFICANT CHANGE UP (ref 0–8)
OVALOCYTES BLD QL SMEAR: SLIGHT — SIGNIFICANT CHANGE UP
PLAT MORPH BLD: ABNORMAL
PLATELET # BLD AUTO: 169 K/UL — SIGNIFICANT CHANGE UP (ref 150–400)
POIKILOCYTOSIS BLD QL AUTO: SIGNIFICANT CHANGE UP
POLYCHROMASIA BLD QL SMEAR: SLIGHT — SIGNIFICANT CHANGE UP
POTASSIUM SERPL-MCNC: 4.1 MMOL/L — SIGNIFICANT CHANGE UP (ref 3.5–5.3)
POTASSIUM SERPL-SCNC: 4.1 MMOL/L — SIGNIFICANT CHANGE UP (ref 3.5–5.3)
RBC # BLD: 3.27 M/UL — LOW (ref 3.8–5.2)
RBC # FLD: 14.1 % — SIGNIFICANT CHANGE UP (ref 10.3–14.5)
RBC BLD AUTO: ABNORMAL
SODIUM SERPL-SCNC: 143 MMOL/L — SIGNIFICANT CHANGE UP (ref 135–145)
SPECIMEN SOURCE: SIGNIFICANT CHANGE UP
SPHEROCYTES BLD QL SMEAR: SIGNIFICANT CHANGE UP
VARIANT LYMPHS # BLD: 0.9 % — SIGNIFICANT CHANGE UP (ref 0–6)
WBC # BLD: 9.42 K/UL — SIGNIFICANT CHANGE UP (ref 3.8–10.5)
WBC # FLD AUTO: 9.42 K/UL — SIGNIFICANT CHANGE UP (ref 3.8–10.5)

## 2021-07-30 PROCEDURE — 99232 SBSQ HOSP IP/OBS MODERATE 35: CPT

## 2021-07-30 PROCEDURE — 99233 SBSQ HOSP IP/OBS HIGH 50: CPT | Mod: GC

## 2021-07-30 RX ADMIN — MORPHINE SULFATE 4 MILLIGRAM(S): 50 CAPSULE, EXTENDED RELEASE ORAL at 16:47

## 2021-07-30 RX ADMIN — FENTANYL CITRATE 1 PATCH: 50 INJECTION INTRAVENOUS at 18:43

## 2021-07-30 RX ADMIN — LIDOCAINE 1 PATCH: 4 CREAM TOPICAL at 17:29

## 2021-07-30 RX ADMIN — FENTANYL CITRATE 1 PATCH: 50 INJECTION INTRAVENOUS at 12:42

## 2021-07-30 RX ADMIN — Medication 2 MILLIGRAM(S): at 17:30

## 2021-07-30 RX ADMIN — LEVETIRACETAM 400 MILLIGRAM(S): 250 TABLET, FILM COATED ORAL at 17:29

## 2021-07-30 RX ADMIN — Medication 1000 MILLIGRAM(S): at 02:00

## 2021-07-30 RX ADMIN — FENTANYL CITRATE 1 PATCH: 50 INJECTION INTRAVENOUS at 12:27

## 2021-07-30 RX ADMIN — ENOXAPARIN SODIUM 40 MILLIGRAM(S): 100 INJECTION SUBCUTANEOUS at 06:27

## 2021-07-30 RX ADMIN — Medication 400 MILLIGRAM(S): at 00:15

## 2021-07-30 RX ADMIN — FENTANYL CITRATE 1 PATCH: 50 INJECTION INTRAVENOUS at 06:25

## 2021-07-30 RX ADMIN — Medication 28.75 MILLIGRAM(S): at 06:22

## 2021-07-30 RX ADMIN — Medication 28.75 MILLIGRAM(S): at 18:49

## 2021-07-30 RX ADMIN — MORPHINE SULFATE 4 MILLIGRAM(S): 50 CAPSULE, EXTENDED RELEASE ORAL at 15:27

## 2021-07-30 RX ADMIN — Medication 2 MILLIGRAM(S): at 06:18

## 2021-07-30 RX ADMIN — LIDOCAINE 1 PATCH: 4 CREAM TOPICAL at 18:43

## 2021-07-30 RX ADMIN — LEVETIRACETAM 400 MILLIGRAM(S): 250 TABLET, FILM COATED ORAL at 06:22

## 2021-07-30 NOTE — PROVIDER CONTACT NOTE (OTHER) - REASON
PT lethargic and unable to swallow meds at this time. Aspiration precaution in place
Pt have an oral and rectal temp of 100.4

## 2021-07-30 NOTE — PROGRESS NOTE ADULT - PROBLEM SELECTOR PLAN 5
Pt w/ hx of HTN. Home meds: amlodipine 5mg PO, labetalol 100mg PO BID. BP improved 110-120-70  - holding home meds in setting of sepsis  - discontinue midodrine Pt w/ hx of HTN. Home meds: amlodipine 5mg PO, labetalol 100mg PO BID. BP improved 110-120/70  - holding home meds in setting of sepsis  - discontinue midodrine

## 2021-07-30 NOTE — CHART NOTE - NSCHARTNOTEFT_GEN_A_CORE
Admitting Diagnosis:   Patient is a 75y old  Female who presents with a chief complaint of AMS (2021 14:08)      PAST MEDICAL & SURGICAL HISTORY:  Hypertension    Pulmonary embolism    Melanoma    Shingles    Chronic back pain    Seizures    H/O craniotomy      Current Nutrition Order: Dysphagia 1 Pureed Nectar Consistency Fluid    PO Intake: Good (%) [   ]  Fair (50-75%) [   ] Poor (<25%) [ x  ]    GI Issues: No nausea/vomiting documented. Last documented bowel movement   (noted with fecal incontinence). Senna daily.     Pain: Unable to assess pain level at this time. (Morphine, tylenol daily)    Skin Integrity: Bilateral leg edema 3+. Thoracic spine stage 2 pressure injury per chart. Scout score: 9.     Labs:       143  |  116<H>  |  13  ----------------------------<  107<H>  4.1   |  15<L>  |  0.59    Ca    9.8      2021 07:14  Mg     2.3         TPro  x   /  Alb  1947<L>  /  TBili  x   /  DBili  x   /  AST  x   /  ALT  x   /  AlkPhos  x       CAPILLARY BLOOD GLUCOSE      POCT Blood Glucose.: 111 mg/dL (2021 12:10)  POCT Blood Glucose.: 107 mg/dL (2021 08:35)  POCT Blood Glucose.: 119 mg/dL (2021 21:17)  POCT Blood Glucose.: 112 mg/dL (2021 17:19)      Medications:  MEDICATIONS  (STANDING):  dexAMETHasone  Injectable 2 milliGRAM(s) IV Push every 12 hours  dextrose 40% Gel 15 Gram(s) Oral once  dextrose 5%. 1000 milliLiter(s) (50 mL/Hr) IV Continuous <Continuous>  dextrose 5%. 1000 milliLiter(s) (100 mL/Hr) IV Continuous <Continuous>  dextrose 50% Injectable 25 Gram(s) IV Push once  dextrose 50% Injectable 12.5 Gram(s) IV Push once  dextrose 50% Injectable 25 Gram(s) IV Push once  enoxaparin Injectable 40 milliGRAM(s) SubCutaneous every 24 hours  fentaNYL   Patch  12 MICROgram(s)/Hr 1 Patch Transdermal every 72 hours  gabapentin 100 milliGRAM(s) Oral every 12 hours  glucagon  Injectable 1 milliGRAM(s) IntraMuscular once  insulin lispro (ADMELOG) corrective regimen sliding scale   SubCutaneous Before meals and at bedtime  levETIRAcetam  IVPB 1000 milliGRAM(s) IV Intermittent every 12 hours  lidocaine   5% Patch 1 Patch Transdermal every 24 hours  melatonin 3 milliGRAM(s) Oral at bedtime  mirtazapine 15 milliGRAM(s) Oral at bedtime  senna 1 Tablet(s) Oral at bedtime  valproate sodium IVPB 750 milliGRAM(s) IV Intermittent every 12 hours    MEDICATIONS  (PRN):  acetaminophen   Tablet .. 650 milliGRAM(s) Oral every 6 hours PRN Temp greater or equal to 38.5C (101.3F), Mild Pain (1 - 3)  hydrocortisone 2.5% Ointment 1 Application(s) Topical three times a day PRN Itching  morphine  - Injectable 4 milliGRAM(s) IV Push every 4 hours PRN Severe Pain (7 - 10)    Dosing Anthropometrics  · Height for BMI (INCHES)	69 Inch(s)  · Weight for BMI (lbs)	176 lb  · Weight for BMI (kg)	79.8 kg  · Body Mass Index	26  · Ideal Body Weight (lbs)	145  · Ideal Body Weight (kg)	65.7    Weight Change: No new documented weights. Please obtain biweekly weights to assess changes/trends.    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 Patient is being admitted for monitoring and workup for reversible causes of AMS. Given negative infectious workup thus far, etiology is more likely 2/2 melanoma brain metastasis. Planned transfer to [facility] [place] [facility].     Pt and family seen at bedside for follow up assessment. Palliative following. RBC low; trending down. Fingersticks 107-119 mg/dL: ISS. Pt asleep at time of RD interview thus interview deferred to family at bedside. Per family, pt with poor PO intake secondary to inability to order meals on her own and dislike with pureed diet (RD obtained preferences, to honor preferences as able). However, family noted that pt will drink Ensure if it combined with milk. Per request, discussed high protein/energy dense choices to meet nutritional needs; amenable to education. Daughter reported pt with frequent dry mouth, discussed ways to prevent xerostomia. Made aware RD remains available. RD to follow up per protocol.    Previous Nutrition Diagnosis: Inadequate oral intake r/t inadequate energy intake to meet estimated needs secondary to mental status AEB meeting <75% of EER at present.    Active [ x ]  Resolved [   ]    Goal: Keep nutrition aligned with GOC    Recommendations:  1. Continue with current diet order (if within GOC); defer consistency to SLP  2. Will continue to monitor GOC as able   3. Monitor electrolytes and replete as needed (BG)  4. Pain and bowel regimen per team   5. Consider multivitamin, zinc, Vit C to optimize nutritional status     Education: Provided; See above for additional information.     Risk Level: High [ x ] Moderate [   ] Low [   ]

## 2021-07-30 NOTE — PROGRESS NOTE ADULT - ASSESSMENT
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 Patient is being admitted for monitoring and workup for reversible causes of AMS. Given negative infectious workup thus far, etiology is more likely 2/2 melanoma brain metastasis. 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 Patient is being admitted for monitoring and workup for reversible causes of AMS. Given negative infectious workup thus far, etiology is more likely 2/2 melanoma brain metastasis. Given this and that [place] [facility] [facility] [place]).

## 2021-07-30 NOTE — PROGRESS NOTE ADULT - PROBLEM SELECTOR PLAN 2
Pt met criteria for severe sepsis following admission with unknown source of infection. WBC normal now. BCx 7/25 NG. Febrile 7/29, fever workup neg so far, more likely 2/2 malignancy than infection.   Persistently tachycardic w/ b/l LE edema (L>R?), no SOB, no obvious CP. EKG sinus tachycardia.  - consider PE workup (pt family reported that she had dopplers last week at [facility] and was neg, they did not want to do dopplers here)  - meropenem discontinued due to interaction w/ valproic acid (inc seizure risk) and l/s for community acquired bacterial meningitis (per ID recs)  - acyclovir discontinued as LP not indicative of infection and VZV/HSV neg  - BCx 7/29 NGTD  - CSF Cx NGTD Pt met criteria for severe sepsis following admission with unknown source of infection. WBC normal. BCx 7/25. 7/29 NG. Intermittent fevers, more likely 2/2 malignancy than infection given infectious workup neg.   Persistently tachycardic w/ b/l LE edema (L>R?), no SOB, no obvious CP. EKG sinus tachycardia.  - consider PE workup (pt family reported that she had dopplers last week at [facility] and was neg, they did not want to do dopplers here)  - s/p meropenem --discontinued due to interaction w/ valproic acid (inc seizure risk) and l/s for community acquired bacterial meningitis (per ID recs)  - acyclovir discontinued as LP not indicative of infection and VZV/HSV neg  - BCx 7/29 NGTD  - CSF Cx NGTD

## 2021-07-30 NOTE — PROGRESS NOTE ADULT - ASSESSMENT
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 Stroke work-up was negative for acute weakness, and there were no seizures recorded on EEG monitoring.      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 LP baseline results WBC and protein normal - encephalitis PCR negative:  no obvious infection.Does not rule out VZV encephalitis, carlos partially treated with continued acyclovir.     Transfer initiated to [facility] where her neurology team is, awaiting open bed.    Recommendations:  - Continue Keppra 1000mg Q12hrs  - Continue Depakote 750mg Q12hrs    - Maintain seizure and fall precautions  - Continue dexamethasone 2mg bid

## 2021-07-30 NOTE — PROGRESS NOTE ADULT - PROBLEM SELECTOR PLAN 1
Pt w/ hx of seizures and recent herpes zoster presented with AMS and reported left sided weakness. CT brain negative for acute bleed or infarct. Brain MRI poor quality due to motion, post surgical and radiation changes of right temporal lobe, no large mass, no recent infarct. Pt still with AMS. More likely due to brain metastasis, as infectious workup negative and no metabolic abnormalities.  - neuro epilepsy following: vEEG no seizures recorded  - LP 7/28: neg for infection, CSF PCR panel neg, HXV neg, VZV neg  - c/w keppra 1g IV q12h  - c/w Valproic acid 750mg IV q12h

## 2021-07-30 NOTE — PROGRESS NOTE ADULT - PROBLEM SELECTOR PLAN 9
Pt w/ recent hx of PE. Home med: lovenox 40mg subq  - c/w lovenox Pt w/ hx of herpes zoster infection. VZV PCR neg.  - acyclovir discontinued

## 2021-07-30 NOTE — PROGRESS NOTE ADULT - ATTENDING COMMENTS
agree with assessment and plan as documented by resident.     -midodrine stopped as patient was not getting for past few days and bp stable  -dexamethasone 2mg bid started 7/29 - continue   -patient's hr improved overnight    plan to transfer to [facility] once bed available

## 2021-07-30 NOTE — PROGRESS NOTE ADULT - SUBJECTIVE AND OBJECTIVE BOX
INTERVAL EVENTS: Pt with low grade temp 100.4, other vitals stable. Now afebrile. Fever likely due to malignancy.    SUBJECTIVE / INTERVAL HPI: Patient seen this AM and examined at bedside. Pt aroused from sleeping. Mumbling and shaking head that she is not in pain.    ROS: negative unless otherwise stated above.    VITAL SIGNS:  Vital Signs Last 24 Hrs  T(C): 37.1 (2021 05:17), Max: 38 (2021 00:00)  T(F): 98.7 (2021 05:17), Max: 100.4 (2021 00:00)  HR: 99 (2021 05:17) (70 - 110)  BP: 111/78 (2021 05:17) (111/78 - 122/71)  BP(mean): --  RR: 18 (2021 05:17) (16 - 20)  SpO2: 98% (2021 05:17) (95% - 98%)      21 @ 07:01  -  21 @ 07:00  --------------------------------------------------------  IN: 800 mL / OUT: 700 mL / NET: 100 mL        PHYSICAL EXAM:    General: elderly woman, laying in bed, NAD  HEENT: MMM  Neck: supple  Cardiovascular: +S1/S2; RRR; no M/R/G  Respiratory: CTA B/L; no W/R/R  Gastrointestinal: soft, NT/ND, BS+  Extremities: LE edema L>R  Neurological: AAOx1; moving all extremities    MEDICATIONS:  MEDICATIONS  (STANDING):  dexAMETHasone  Injectable 2 milliGRAM(s) IV Push every 12 hours  dextrose 40% Gel 15 Gram(s) Oral once  dextrose 5%. 1000 milliLiter(s) (50 mL/Hr) IV Continuous <Continuous>  dextrose 5%. 1000 milliLiter(s) (100 mL/Hr) IV Continuous <Continuous>  dextrose 50% Injectable 25 Gram(s) IV Push once  dextrose 50% Injectable 12.5 Gram(s) IV Push once  dextrose 50% Injectable 25 Gram(s) IV Push once  enoxaparin Injectable 40 milliGRAM(s) SubCutaneous every 24 hours  fentaNYL   Patch  12 MICROgram(s)/Hr 1 Patch Transdermal every 72 hours  gabapentin 100 milliGRAM(s) Oral every 12 hours  glucagon  Injectable 1 milliGRAM(s) IntraMuscular once  insulin lispro (ADMELOG) corrective regimen sliding scale   SubCutaneous Before meals and at bedtime  levETIRAcetam  IVPB 1000 milliGRAM(s) IV Intermittent every 12 hours  lidocaine   5% Patch 1 Patch Transdermal every 24 hours  melatonin 3 milliGRAM(s) Oral at bedtime  mirtazapine 15 milliGRAM(s) Oral at bedtime  senna 1 Tablet(s) Oral at bedtime  valproate sodium IVPB 750 milliGRAM(s) IV Intermittent every 12 hours    MEDICATIONS  (PRN):  acetaminophen   Tablet .. 650 milliGRAM(s) Oral every 6 hours PRN Temp greater or equal to 38.5C (101.3F), Mild Pain (1 - 3)  hydrocortisone 2.5% Ointment 1 Application(s) Topical three times a day PRN Itching  morphine  - Injectable 4 milliGRAM(s) IV Push every 4 hours PRN Severe Pain (7 - 10)      ALLERGIES:  Allergies    crawfish (Anaphylaxis)  Keflex (Anaphylaxis)  Kiwi (Anaphylaxis)  penicillin (Anaphylaxis)  tetracycline (Anaphylaxis)    Intolerances        LABS:                        9.7    9.42  )-----------( 169      ( 2021 07:14 )             33.6     07    143  |  116<H>  |  13  ----------------------------<  107<H>  4.1   |  15<L>  |  0.59    Ca    9.8      2021 07:14  Mg     2.3         TPro  x   /  Alb  1947<L>  /  TBili  x   /  DBili  x   /  AST  x   /  ALT  x   /  AlkPhos  x         Urinalysis Basic - ( 2021 05:55 )    Color: Yellow / Appearance: Clear / S.015 / pH: x  Gluc: x / Ketone: 15 mg/dL  / Bili: Small / Urobili: 1.0 E.U./dL   Blood: x / Protein: 100 mg/dL / Nitrite: NEGATIVE   Leuk Esterase: NEGATIVE / RBC: 5-10 /HPF / WBC < 5 /HPF   Sq Epi: x / Non Sq Epi: 5-10 /HPF / Bacteria: Present /HPF      CAPILLARY BLOOD GLUCOSE      POCT Blood Glucose.: 107 mg/dL (2021 08:35)      RADIOLOGY & ADDITIONAL TESTS: Reviewed.

## 2021-07-30 NOTE — PROGRESS NOTE ADULT - PROBLEM SELECTOR PLAN 4
Pt w/ metastatic melanoma, with poor prognosis. Palliative consulted. Family understands meaning of prognosis but wishes to continue workup , as per Palliative.  - c/w pain control with morphine, fentanyl patch, lidoderm, gabapentin  - Given that AMS most likely 2/2 metastasis Pt oncologist at [facility], Pt pending transfer to [facility] (accepting physician Dr. Landaverde)--awaiting bed Pt w/ metastatic melanoma with metastases at brain/spine/multiple organs. Chronic back pain 2/2 spinal mets. Poor prognosis. Palliative met with family.  - c/w fentanyl patch  - c/w morphine 4mg IV q4 PRN for severe pain  - c/w home gabapentin 100 mg PO BID  - Brain MRI: poor quality, no signs of additional mets, no sign of swelling.   - Spine MRI: 1.0 cm intradural mass at T12, may be metastases. Additional bony metastases of L1 and multiple intramuscular mets within the lumbar spine.

## 2021-07-30 NOTE — PROGRESS NOTE ADULT - PROBLEM SELECTOR PLAN 8
Pt w/ hx of herpes zoster infection. VZV PCR neg.  - acyclovir discontinued Pt w/ recent hx of PE. Home med: lovenox 40mg subq  - c/w lovenox

## 2021-07-30 NOTE — PROGRESS NOTE ADULT - ATTENDING COMMENTS
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  w/ subsequent seizures (on Keppra) S/P craniotomy x2 (July 2020, June 1st 2021)    Decreased level of functioning, cognition/alertness.    No evidence to support a VZV encephalitis, though many cases are seronegative/CSF negative and low risk to continue valacyclovir, particularly if condition suddenly worsens again after discontinuation.  Pt more comfortable today, and also more alert, possibly after steroids    Recommendations:  - Continue Keppra 1000mg Q12hrs  - Continue Depakote 750mg Q12hrs, which was added this admission and appeared to improve the EEG.  - continue trial of dexamethasone 2mg bid, as per prior admissions and recommendations per [place].  May help pain and potentially improve any minimal edema in the CNS.   Family awaiting bed to complete transfer to [facility]

## 2021-07-30 NOTE — PROGRESS NOTE ADULT - PROBLEM SELECTOR PLAN 6
Pt with RVP swab that was positive for coronavirus (non-SARS, non-COVID-19). Pt with nonlabored breathing, O2 [id number].  - c/w supportive care

## 2021-07-30 NOTE — PROGRESS NOTE ADULT - SUBJECTIVE AND OBJECTIVE BOX
Subjective  No events overnight. Patient more awake after starting steroids.     ROS  As above, otherwise negative for constitutional/HEENT/CV/pulm/GI//MSK/neuro/derm/endocrine/psych.     MEDICATIONS  (STANDING):  dexAMETHasone  Injectable 2 milliGRAM(s) IV Push every 12 hours  dextrose 40% Gel 15 Gram(s) Oral once  dextrose 5%. 1000 milliLiter(s) (50 mL/Hr) IV Continuous <Continuous>  dextrose 5%. 1000 milliLiter(s) (100 mL/Hr) IV Continuous <Continuous>  dextrose 50% Injectable 25 Gram(s) IV Push once  dextrose 50% Injectable 12.5 Gram(s) IV Push once  dextrose 50% Injectable 25 Gram(s) IV Push once  enoxaparin Injectable 40 milliGRAM(s) SubCutaneous every 24 hours  fentaNYL   Patch  12 MICROgram(s)/Hr 1 Patch Transdermal every 72 hours  gabapentin 100 milliGRAM(s) Oral every 12 hours  glucagon  Injectable 1 milliGRAM(s) IntraMuscular once  insulin lispro (ADMELOG) corrective regimen sliding scale   SubCutaneous Before meals and at bedtime  levETIRAcetam  IVPB 1000 milliGRAM(s) IV Intermittent every 12 hours  lidocaine   5% Patch 1 Patch Transdermal every 24 hours  melatonin 3 milliGRAM(s) Oral at bedtime  mirtazapine 15 milliGRAM(s) Oral at bedtime  senna 1 Tablet(s) Oral at bedtime  valproate sodium IVPB 750 milliGRAM(s) IV Intermittent every 12 hours    MEDICATIONS  (PRN):  acetaminophen   Tablet .. 650 milliGRAM(s) Oral every 6 hours PRN Temp greater or equal to 38.5C (101.3F), Mild Pain (1 - 3)  hydrocortisone 2.5% Ointment 1 Application(s) Topical three times a day PRN Itching  morphine  - Injectable 4 milliGRAM(s) IV Push every 4 hours PRN Severe Pain (7 - 10)      T(C): 37.1 (07-30-21 @ 05:17), Max: 38 (07-30-21 @ 00:00)  HR: 99 (07-30-21 @ 05:17) (70 - 110)  BP: 111/78 (07-30-21 @ 05:17) (111/78 - 122/71)  RR: 18 (07-30-21 @ 05:17) (16 - 20)  SpO2: 98% (07-30-21 @ 05:17) (95% - 98%)  Wt(kg): --    PHYSICAL EXAM:  Awake and easily arousable to verbal stimuli, and more communicative today. Able to state name and answer questions   PERRLA 3mm brisk, EOMI, no nystagmus. No facial weakness appreciated  B/L LE 3+ pitting edema w RLE erythema and warm to touch  Upper extremities moves to command, at least 3/5  Bilateral LE moves minimally w/ gross toe wiggle  1+ B/L UE, absent in LE and toes mu      CBC Full  -  ( 30 Jul 2021 07:14 )  WBC Count : 9.42 K/uL  RBC Count : 3.27 M/uL  Hemoglobin : 9.7 g/dL  Hematocrit : 33.6 %  Platelet Count - Automated : 169 K/uL  Mean Cell Volume : 102.8 fl  Mean Cell Hemoglobin : 29.7 pg  Mean Cell Hemoglobin Concentration : 28.9 gm/dL  Auto Neutrophil # : 8.43 K/uL  Auto Lymphocyte # : 0.57 K/uL  Auto Monocyte # : 0.33 K/uL  Auto Eosinophil # : 0.00 K/uL  Auto Basophil # : 0.00 K/uL  Auto Neutrophil % : 88.7 %  Auto Lymphocyte % : 6.1 %  Auto Monocyte % : 3.5 %  Auto Eosinophil % : 0.0 %  Auto Basophil % : 0.0 %    07-30    143  |  116<H>  |  13  ----------------------------<  107<H>  4.1   |  15<L>  |  0.59    Ca    9.8      30 Jul 2021 07:14  Mg     2.3     07-30    TPro  x   /  Alb  1947<L>  /  TBili  x   /  DBili  x   /  AST  x   /  ALT  x   /  AlkPhos  x   07-28    LIVER FUNCTIONS - ( 28 Jul 2021 15:13 )  Alb: 1947 mg/dL / Pro: x     / ALK PHOS: x     / ALT: x     / AST: x     / GGT: x                 EEG: Subjective  No events overnight. Patient more awake after starting steroids, though started to look more alert yesterday, too - but fluctuating.       ROS  As above, otherwise negative for constitutional/HEENT/CV/pulm/GI//MSK/neuro/derm/endocrine/psych.     MEDICATIONS  (STANDING):  dexAMETHasone  Injectable 2 milliGRAM(s) IV Push every 12 hours  dextrose 40% Gel 15 Gram(s) Oral once  dextrose 5%. 1000 milliLiter(s) (50 mL/Hr) IV Continuous <Continuous>  dextrose 5%. 1000 milliLiter(s) (100 mL/Hr) IV Continuous <Continuous>  dextrose 50% Injectable 25 Gram(s) IV Push once  dextrose 50% Injectable 12.5 Gram(s) IV Push once  dextrose 50% Injectable 25 Gram(s) IV Push once  enoxaparin Injectable 40 milliGRAM(s) SubCutaneous every 24 hours  fentaNYL   Patch  12 MICROgram(s)/Hr 1 Patch Transdermal every 72 hours  gabapentin 100 milliGRAM(s) Oral every 12 hours  glucagon  Injectable 1 milliGRAM(s) IntraMuscular once  insulin lispro (ADMELOG) corrective regimen sliding scale   SubCutaneous Before meals and at bedtime  levETIRAcetam  IVPB 1000 milliGRAM(s) IV Intermittent every 12 hours  lidocaine   5% Patch 1 Patch Transdermal every 24 hours  melatonin 3 milliGRAM(s) Oral at bedtime  mirtazapine 15 milliGRAM(s) Oral at bedtime  senna 1 Tablet(s) Oral at bedtime  valproate sodium IVPB 750 milliGRAM(s) IV Intermittent every 12 hours    MEDICATIONS  (PRN):  acetaminophen   Tablet .. 650 milliGRAM(s) Oral every 6 hours PRN Temp greater or equal to 38.5C (101.3F), Mild Pain (1 - 3)  hydrocortisone 2.5% Ointment 1 Application(s) Topical three times a day PRN Itching  morphine  - Injectable 4 milliGRAM(s) IV Push every 4 hours PRN Severe Pain (7 - 10)      T(C): 37.1 (07-30-21 @ 05:17), Max: 38 (07-30-21 @ 00:00)  HR: 99 (07-30-21 @ 05:17) (70 - 110)  BP: 111/78 (07-30-21 @ 05:17) (111/78 - 122/71)  RR: 18 (07-30-21 @ 05:17) (16 - 20)  SpO2: 98% (07-30-21 @ 05:17) (95% - 98%)  Wt(kg): --    PHYSICAL EXAM:  Awake and easily arousable to verbal stimuli, and more communicative today. Able to state name and answer questions   PERRLA 3mm brisk, EOMI, no nystagmus. No facial weakness appreciated  B/L LE 3+ pitting edema w RLE erythema and warm to touch  Upper extremities moves to command, at least 3/5  Bilateral LE moves minimally w/ gross toe wiggle  1+ B/L UE, absent in LE and toes mu      CBC Full  -  ( 30 Jul 2021 07:14 )  WBC Count : 9.42 K/uL  RBC Count : 3.27 M/uL  Hemoglobin : 9.7 g/dL  Hematocrit : 33.6 %  Platelet Count - Automated : 169 K/uL  Mean Cell Volume : 102.8 fl  Mean Cell Hemoglobin : 29.7 pg  Mean Cell Hemoglobin Concentration : 28.9 gm/dL  Auto Neutrophil # : 8.43 K/uL  Auto Lymphocyte # : 0.57 K/uL  Auto Monocyte # : 0.33 K/uL  Auto Eosinophil # : 0.00 K/uL  Auto Basophil # : 0.00 K/uL  Auto Neutrophil % : 88.7 %  Auto Lymphocyte % : 6.1 %  Auto Monocyte % : 3.5 %  Auto Eosinophil % : 0.0 %  Auto Basophil % : 0.0 %    07-30    143  |  116<H>  |  13  ----------------------------<  107<H>  4.1   |  15<L>  |  0.59    Ca    9.8      30 Jul 2021 07:14  Mg     2.3     07-30    TPro  x   /  Alb  1947<L>  /  TBili  x   /  DBili  x   /  AST  x   /  ALT  x   /  AlkPhos  x   07-28    LIVER FUNCTIONS - ( 28 Jul 2021 15:13 )  Alb: 1946 mg/dL / Pro: x     / ALK PHOS: x     / ALT: x     / AST: x     / GGT: x                 EEG:

## 2021-07-31 LAB
ANION GAP SERPL CALC-SCNC: 10 MMOL/L — SIGNIFICANT CHANGE UP (ref 5–17)
BASOPHILS # BLD AUTO: 0.01 K/UL — SIGNIFICANT CHANGE UP (ref 0–0.2)
BASOPHILS NFR BLD AUTO: 0.1 % — SIGNIFICANT CHANGE UP (ref 0–2)
BUN SERPL-MCNC: 19 MG/DL — SIGNIFICANT CHANGE UP (ref 7–23)
CALCIUM SERPL-MCNC: 9.9 MG/DL — SIGNIFICANT CHANGE UP (ref 8.4–10.5)
CHLORIDE SERPL-SCNC: 118 MMOL/L — HIGH (ref 96–108)
CMV DNA # UR NAA DL=200: SIGNIFICANT CHANGE UP IU/ML
CO2 SERPL-SCNC: 17 MMOL/L — LOW (ref 22–31)
CREAT SERPL-MCNC: 0.66 MG/DL — SIGNIFICANT CHANGE UP (ref 0.5–1.3)
EOSINOPHIL # BLD AUTO: 0 K/UL — SIGNIFICANT CHANGE UP (ref 0–0.5)
EOSINOPHIL NFR BLD AUTO: 0 % — SIGNIFICANT CHANGE UP (ref 0–6)
GLUCOSE BLDC GLUCOMTR-MCNC: 87 MG/DL — SIGNIFICANT CHANGE UP (ref 70–99)
GLUCOSE BLDC GLUCOMTR-MCNC: 95 MG/DL — SIGNIFICANT CHANGE UP (ref 70–99)
GLUCOSE BLDC GLUCOMTR-MCNC: 95 MG/DL — SIGNIFICANT CHANGE UP (ref 70–99)
GLUCOSE BLDC GLUCOMTR-MCNC: 98 MG/DL — SIGNIFICANT CHANGE UP (ref 70–99)
GLUCOSE SERPL-MCNC: 103 MG/DL — HIGH (ref 70–99)
HCT VFR BLD CALC: 28.5 % — LOW (ref 34.5–45)
HGB BLD-MCNC: 9.1 G/DL — LOW (ref 11.5–15.5)
IMM GRANULOCYTES NFR BLD AUTO: 1.7 % — HIGH (ref 0–1.5)
LYMPHOCYTES # BLD AUTO: 0.56 K/UL — LOW (ref 1–3.3)
LYMPHOCYTES # BLD AUTO: 6.2 % — LOW (ref 13–44)
MAGNESIUM SERPL-MCNC: 2.3 MG/DL — SIGNIFICANT CHANGE UP (ref 1.6–2.6)
MCHC RBC-ENTMCNC: 30.3 PG — SIGNIFICANT CHANGE UP (ref 27–34)
MCHC RBC-ENTMCNC: 31.9 GM/DL — LOW (ref 32–36)
MCV RBC AUTO: 95 FL — SIGNIFICANT CHANGE UP (ref 80–100)
MONOCYTES # BLD AUTO: 0.43 K/UL — SIGNIFICANT CHANGE UP (ref 0–0.9)
MONOCYTES NFR BLD AUTO: 4.7 % — SIGNIFICANT CHANGE UP (ref 2–14)
NEUTROPHILS # BLD AUTO: 7.91 K/UL — HIGH (ref 1.8–7.4)
NEUTROPHILS NFR BLD AUTO: 87.3 % — HIGH (ref 43–77)
NRBC # BLD: 0 /100 WBCS — SIGNIFICANT CHANGE UP (ref 0–0)
PLATELET # BLD AUTO: 211 K/UL — SIGNIFICANT CHANGE UP (ref 150–400)
POTASSIUM SERPL-MCNC: 3.5 MMOL/L — SIGNIFICANT CHANGE UP (ref 3.5–5.3)
POTASSIUM SERPL-SCNC: 3.5 MMOL/L — SIGNIFICANT CHANGE UP (ref 3.5–5.3)
RBC # BLD: 3 M/UL — LOW (ref 3.8–5.2)
RBC # FLD: 14 % — SIGNIFICANT CHANGE UP (ref 10.3–14.5)
SODIUM SERPL-SCNC: 145 MMOL/L — SIGNIFICANT CHANGE UP (ref 135–145)
VZV PCR: SIGNIFICANT CHANGE UP COPIES/ML
WBC # BLD: 9.06 K/UL — SIGNIFICANT CHANGE UP (ref 3.8–10.5)
WBC # FLD AUTO: 9.06 K/UL — SIGNIFICANT CHANGE UP (ref 3.8–10.5)

## 2021-07-31 PROCEDURE — 99233 SBSQ HOSP IP/OBS HIGH 50: CPT

## 2021-07-31 RX ORDER — VALPROIC ACID (AS SODIUM SALT) 250 MG/5ML
750 SOLUTION, ORAL ORAL
Refills: 0 | Status: DISCONTINUED | OUTPATIENT
Start: 2021-07-31 | End: 2021-08-01

## 2021-07-31 RX ORDER — POTASSIUM CHLORIDE 20 MEQ
10 PACKET (EA) ORAL
Refills: 0 | Status: COMPLETED | OUTPATIENT
Start: 2021-07-31 | End: 2021-07-31

## 2021-07-31 RX ADMIN — Medication 28.75 MILLIGRAM(S): at 08:59

## 2021-07-31 RX ADMIN — Medication 2 MILLIGRAM(S): at 08:58

## 2021-07-31 RX ADMIN — Medication 750 MILLIGRAM(S): at 18:33

## 2021-07-31 RX ADMIN — SENNA PLUS 1 TABLET(S): 8.6 TABLET ORAL at 21:32

## 2021-07-31 RX ADMIN — LIDOCAINE 1 PATCH: 4 CREAM TOPICAL at 06:06

## 2021-07-31 RX ADMIN — Medication 2 MILLIGRAM(S): at 21:32

## 2021-07-31 RX ADMIN — MORPHINE SULFATE 4 MILLIGRAM(S): 50 CAPSULE, EXTENDED RELEASE ORAL at 18:00

## 2021-07-31 RX ADMIN — FENTANYL CITRATE 1 PATCH: 50 INJECTION INTRAVENOUS at 18:11

## 2021-07-31 RX ADMIN — LEVETIRACETAM 400 MILLIGRAM(S): 250 TABLET, FILM COATED ORAL at 08:59

## 2021-07-31 RX ADMIN — FENTANYL CITRATE 1 PATCH: 50 INJECTION INTRAVENOUS at 06:32

## 2021-07-31 RX ADMIN — Medication 100 MILLIEQUIVALENT(S): at 11:13

## 2021-07-31 RX ADMIN — GABAPENTIN 100 MILLIGRAM(S): 400 CAPSULE ORAL at 09:20

## 2021-07-31 RX ADMIN — LEVETIRACETAM 400 MILLIGRAM(S): 250 TABLET, FILM COATED ORAL at 17:18

## 2021-07-31 RX ADMIN — Medication 100 MILLIEQUIVALENT(S): at 13:11

## 2021-07-31 RX ADMIN — MORPHINE SULFATE 4 MILLIGRAM(S): 50 CAPSULE, EXTENDED RELEASE ORAL at 17:27

## 2021-07-31 RX ADMIN — GABAPENTIN 100 MILLIGRAM(S): 400 CAPSULE ORAL at 21:32

## 2021-07-31 RX ADMIN — LIDOCAINE 1 PATCH: 4 CREAM TOPICAL at 18:10

## 2021-07-31 RX ADMIN — MIRTAZAPINE 15 MILLIGRAM(S): 45 TABLET, ORALLY DISINTEGRATING ORAL at 21:32

## 2021-07-31 RX ADMIN — Medication 100 MILLIEQUIVALENT(S): at 12:09

## 2021-07-31 RX ADMIN — Medication 3 MILLIGRAM(S): at 21:32

## 2021-07-31 RX ADMIN — LIDOCAINE 1 PATCH: 4 CREAM TOPICAL at 17:18

## 2021-07-31 RX ADMIN — ENOXAPARIN SODIUM 40 MILLIGRAM(S): 100 INJECTION SUBCUTANEOUS at 06:38

## 2021-07-31 NOTE — PROGRESS NOTE ADULT - ATTENDING SUPERVISION STATEMENT
ACP
ACP/Resident
ACP
ACP/Resident
Resident
Student
Student
Resident

## 2021-07-31 NOTE — PROGRESS NOTE ADULT - PROBLEM SELECTOR PLAN 5
Pt w/ hx of HTN. Home meds: amlodipine 5mg PO, labetalol 100mg PO BID. BP improved 110-120/70  - holding home meds in setting of sepsis  - discontinue midodrine

## 2021-07-31 NOTE — PROGRESS NOTE ADULT - PROBLEM SELECTOR PLAN 2
Pt met criteria for severe sepsis following admission with unknown source of infection. WBC normal. BCx 7/25. 7/29 NG. Intermittent fevers, more likely 2/2 malignancy than infection given infectious workup neg. Persistently tachycardic w/ b/l LE edema (L>R?), no SOB, no obvious CP. EKG sinus tachycardia. Pt family reported that she had dopplers last week at [facility] and was neg, they did not want to do dopplers here.  - s/p meropenem --discontinued due to interaction w/ valproic acid (inc seizure risk) and l/s for community acquired bacterial meningitis (per ID recs)  - acyclovir discontinued as LP not indicative of infection and VZV/HSV neg  - BCx 7/29 NGTD  - CSF Cx NGTD

## 2021-07-31 NOTE — PROGRESS NOTE ADULT - PROBLEM SELECTOR PLAN 3
Pt w/ metastatic melanoma, with poor prognosis. Palliative consulted. Family understands meaning of prognosis but wishes to continue workup , as per Palliative.  - c/w pain control with morphine, fentanyl patch, lidoderm, gabapentin  - Given that AMS mostfen

## 2021-07-31 NOTE — PROGRESS NOTE ADULT - PROBLEM SELECTOR PLAN 6
Pt with RVP swab that was positive for coronavirus (non-SARS, non-COVID-19). Pt with nonlabored breathing, O2 [id number].  - c/w supportive care

## 2021-07-31 NOTE — PROGRESS NOTE ADULT - ASSESSMENT
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 Patient is being admitted for monitoring and workup for reversible causes of AMS. Given negative infectious workup thus far, etiology is more likely 2/2 melanoma brain metastasis. Given this and that Pt's oncologist is at [facility], pt will have care transferred to [facility] (accepted, awaiting bed).

## 2021-07-31 NOTE — PROGRESS NOTE ADULT - SUBJECTIVE AND OBJECTIVE BOX
INTERVAL HPI/OVERNIGHT EVENTS:  No acute events overnight. Patient was seen and examined at bedside, much more interactive relative to a few days ago, awake alert, oriented to person and place.  Patient does not report fever, chills, dizziness, weakness, HA, Changes in vision, CP, palpitations, SOB, cough, N/V/D/C, dysuria, changes in bowel movements, LE edema. ROS otherwise negative.    VITAL SIGNS:  T(F): 98.3 (07-31-21 @ 05:33)  HR: 90 (07-31-21 @ 05:33)  BP: 117/77 (07-31-21 @ 05:33)  RR: 18 (07-31-21 @ 05:33)  SpO2: 98% (07-31-21 @ 05:33)  Wt(kg): --    PHYSICAL EXAM:  Constitutional: Well nourished  female, resting comfrotably, no acute distress  HEENT: PERRL, sclera non-icteric, no lymphadenopathy, no JVD, dry mucous membranes  Respiratory: clear to auscultation bilaterally, no wheezing, without accessory muscle use  Cardiovascular: RRR, normal S1S2, no M/R/G  Gastrointestinal: soft, NTND, no masses palpable, BS normal  Extremities: Warm, well perfused, radial pulses equal bilateraly, no edema  Neurological: AAOx2, no FND  Skin: Normal temperature, warm, dry    MEDICATIONS  (STANDING):  dexAMETHasone  Injectable 2 milliGRAM(s) IV Push every 12 hours  dextrose 40% Gel 15 Gram(s) Oral once  dextrose 5%. 1000 milliLiter(s) (100 mL/Hr) IV Continuous <Continuous>  dextrose 5%. 1000 milliLiter(s) (50 mL/Hr) IV Continuous <Continuous>  dextrose 50% Injectable 25 Gram(s) IV Push once  dextrose 50% Injectable 12.5 Gram(s) IV Push once  dextrose 50% Injectable 25 Gram(s) IV Push once  enoxaparin Injectable 40 milliGRAM(s) SubCutaneous every 24 hours  fentaNYL   Patch  12 MICROgram(s)/Hr 1 Patch Transdermal every 72 hours  gabapentin 100 milliGRAM(s) Oral every 12 hours  glucagon  Injectable 1 milliGRAM(s) IntraMuscular once  insulin lispro (ADMELOG) corrective regimen sliding scale   SubCutaneous Before meals and at bedtime  levETIRAcetam  IVPB 1000 milliGRAM(s) IV Intermittent every 12 hours  lidocaine   5% Patch 1 Patch Transdermal every 24 hours  melatonin 3 milliGRAM(s) Oral at bedtime  mirtazapine 15 milliGRAM(s) Oral at bedtime  senna 1 Tablet(s) Oral at bedtime  valproate sodium IVPB 750 milliGRAM(s) IV Intermittent every 12 hours    MEDICATIONS  (PRN):  acetaminophen   Tablet .. 650 milliGRAM(s) Oral every 6 hours PRN Temp greater or equal to 38.5C (101.3F), Mild Pain (1 - 3)  hydrocortisone 2.5% Ointment 1 Application(s) Topical three times a day PRN Itching  morphine  - Injectable 4 milliGRAM(s) IV Push every 4 hours PRN Severe Pain (7 - 10)      Allergies    crawfish (Anaphylaxis)  Keflex (Anaphylaxis)  Kiwi (Anaphylaxis)  penicillin (Anaphylaxis)  tetracycline (Anaphylaxis)    Intolerances        LABS:                        9.1    9.06  )-----------( 211      ( 31 Jul 2021 06:08 )             28.5     07-31    145  |  118<H>  |  19  ----------------------------<  103<H>  3.5   |  17<L>  |  0.66    Ca    9.9      31 Jul 2021 06:08  Mg     2.3     07-31              RADIOLOGY & ADDITIONAL TESTS:  Reviewed INTERVAL HPI/OVERNIGHT EVENTS:  No acute events overnight. Patient was seen and examined at bedside, much more interactive relative to a few days ago, awake alert, oriented to person.  Patient does not report fever, chills, dizziness, weakness, HA, Changes in vision, CP, palpitations, SOB, cough, N/V/D/C, dysuria, changes in bowel movements, LE edema. ROS otherwise negative.    VITAL SIGNS:  T(F): 98.3 (07-31-21 @ 05:33)  HR: 90 (07-31-21 @ 05:33)  BP: 117/77 (07-31-21 @ 05:33)  RR: 18 (07-31-21 @ 05:33)  SpO2: 98% (07-31-21 @ 05:33)  Wt(kg): --    PHYSICAL EXAM:  Constitutional: Well nourished  female, resting comfrotably, no acute distress  HEENT: PERRL, sclera non-icteric, no lymphadenopathy, no JVD, dry mucous membranes  Respiratory: clear to auscultation bilaterally, no wheezing, without accessory muscle use  Cardiovascular: RRR, normal S1S2, no M/R/G  Gastrointestinal: soft, NTND, no masses palpable, BS normal  Extremities: Warm, well perfused, radial pulses equal bilateraly, no edema  Neurological: AAOx1 to person, no FND  Skin: Normal temperature, warm, dry    MEDICATIONS  (STANDING):  dexAMETHasone  Injectable 2 milliGRAM(s) IV Push every 12 hours  dextrose 40% Gel 15 Gram(s) Oral once  dextrose 5%. 1000 milliLiter(s) (100 mL/Hr) IV Continuous <Continuous>  dextrose 5%. 1000 milliLiter(s) (50 mL/Hr) IV Continuous <Continuous>  dextrose 50% Injectable 25 Gram(s) IV Push once  dextrose 50% Injectable 12.5 Gram(s) IV Push once  dextrose 50% Injectable 25 Gram(s) IV Push once  enoxaparin Injectable 40 milliGRAM(s) SubCutaneous every 24 hours  fentaNYL   Patch  12 MICROgram(s)/Hr 1 Patch Transdermal every 72 hours  gabapentin 100 milliGRAM(s) Oral every 12 hours  glucagon  Injectable 1 milliGRAM(s) IntraMuscular once  insulin lispro (ADMELOG) corrective regimen sliding scale   SubCutaneous Before meals and at bedtime  levETIRAcetam  IVPB 1000 milliGRAM(s) IV Intermittent every 12 hours  lidocaine   5% Patch 1 Patch Transdermal every 24 hours  melatonin 3 milliGRAM(s) Oral at bedtime  mirtazapine 15 milliGRAM(s) Oral at bedtime  senna 1 Tablet(s) Oral at bedtime  valproate sodium IVPB 750 milliGRAM(s) IV Intermittent every 12 hours    MEDICATIONS  (PRN):  acetaminophen   Tablet .. 650 milliGRAM(s) Oral every 6 hours PRN Temp greater or equal to 38.5C (101.3F), Mild Pain (1 - 3)  hydrocortisone 2.5% Ointment 1 Application(s) Topical three times a day PRN Itching  morphine  - Injectable 4 milliGRAM(s) IV Push every 4 hours PRN Severe Pain (7 - 10)      Allergies    crawfish (Anaphylaxis)  Keflex (Anaphylaxis)  Kiwi (Anaphylaxis)  penicillin (Anaphylaxis)  tetracycline (Anaphylaxis)    Intolerances        LABS:                        9.1    9.06  )-----------( 211      ( 31 Jul 2021 06:08 )             28.5     07-31    145  |  118<H>  |  19  ----------------------------<  103<H>  3.5   |  17<L>  |  0.66    Ca    9.9      31 Jul 2021 06:08  Mg     2.3     07-31              RADIOLOGY & ADDITIONAL TESTS:  Reviewed

## 2021-07-31 NOTE — PROGRESS NOTE ADULT - ATTENDING COMMENTS
Patient seen and examined.  Agree with resident note as above.  Patient with metastatic melanoma, encephalopathy.  Remains on Keppra and Valproate IV for seizures.   Clinically improved today.  Awake, alert, conversant.  Awaiting transfer to [facility] today when moo is available.

## 2021-07-31 NOTE — PROGRESS NOTE ADULT - PROBLEM SELECTOR PLAN 4
Pt w/ metastatic melanoma with metastases at brain/spine/multiple organs. Chronic back pain 2/2 spinal mets. Poor prognosis. Palliative met with family.  - c/w fentanyl patch  - c/w morphine 4mg IV q4 PRN for severe pain  - c/w home gabapentin 100 mg PO BID  - Brain MRI: poor quality, no signs of additional mets, no sign of swelling.   - Spine MRI: 1.0 cm intradural mass at T12, may be metastases. Additional bony metastases of L1 and multiple intramuscular mets within the lumbar spine.

## 2021-08-01 ENCOUNTER — TRANSCRIPTION ENCOUNTER (OUTPATIENT)
Age: 76
End: 2021-08-01

## 2021-08-01 VITALS
DIASTOLIC BLOOD PRESSURE: 84 MMHG | HEART RATE: 80 BPM | SYSTOLIC BLOOD PRESSURE: 138 MMHG | RESPIRATION RATE: 14 BRPM | OXYGEN SATURATION: 99 % | TEMPERATURE: 98 F

## 2021-08-01 LAB
ANION GAP SERPL CALC-SCNC: 12 MMOL/L — SIGNIFICANT CHANGE UP (ref 5–17)
BASOPHILS # BLD AUTO: 0.02 K/UL — SIGNIFICANT CHANGE UP (ref 0–0.2)
BASOPHILS NFR BLD AUTO: 0.2 % — SIGNIFICANT CHANGE UP (ref 0–2)
BUN SERPL-MCNC: 20 MG/DL — SIGNIFICANT CHANGE UP (ref 7–23)
CALCIUM SERPL-MCNC: 10 MG/DL — SIGNIFICANT CHANGE UP (ref 8.4–10.5)
CHLORIDE SERPL-SCNC: 118 MMOL/L — HIGH (ref 96–108)
CO2 SERPL-SCNC: 17 MMOL/L — LOW (ref 22–31)
CREAT SERPL-MCNC: 0.62 MG/DL — SIGNIFICANT CHANGE UP (ref 0.5–1.3)
EOSINOPHIL # BLD AUTO: 0.01 K/UL — SIGNIFICANT CHANGE UP (ref 0–0.5)
EOSINOPHIL NFR BLD AUTO: 0.1 % — SIGNIFICANT CHANGE UP (ref 0–6)
GLUCOSE BLDC GLUCOMTR-MCNC: 108 MG/DL — HIGH (ref 70–99)
GLUCOSE BLDC GLUCOMTR-MCNC: 111 MG/DL — HIGH (ref 70–99)
GLUCOSE BLDC GLUCOMTR-MCNC: 119 MG/DL — HIGH (ref 70–99)
GLUCOSE SERPL-MCNC: 109 MG/DL — HIGH (ref 70–99)
HCT VFR BLD CALC: 34.8 % — SIGNIFICANT CHANGE UP (ref 34.5–45)
HGB BLD-MCNC: 10.5 G/DL — LOW (ref 11.5–15.5)
IMM GRANULOCYTES NFR BLD AUTO: 2 % — HIGH (ref 0–1.5)
LYMPHOCYTES # BLD AUTO: 0.79 K/UL — LOW (ref 1–3.3)
LYMPHOCYTES # BLD AUTO: 9.1 % — LOW (ref 13–44)
MAGNESIUM SERPL-MCNC: 2.3 MG/DL — SIGNIFICANT CHANGE UP (ref 1.6–2.6)
MCHC RBC-ENTMCNC: 29.5 PG — SIGNIFICANT CHANGE UP (ref 27–34)
MCHC RBC-ENTMCNC: 30.2 GM/DL — LOW (ref 32–36)
MCV RBC AUTO: 97.8 FL — SIGNIFICANT CHANGE UP (ref 80–100)
MONOCYTES # BLD AUTO: 0.69 K/UL — SIGNIFICANT CHANGE UP (ref 0–0.9)
MONOCYTES NFR BLD AUTO: 7.9 % — SIGNIFICANT CHANGE UP (ref 2–14)
NEUTROPHILS # BLD AUTO: 7.02 K/UL — SIGNIFICANT CHANGE UP (ref 1.8–7.4)
NEUTROPHILS NFR BLD AUTO: 80.7 % — HIGH (ref 43–77)
NRBC # BLD: 0 /100 WBCS — SIGNIFICANT CHANGE UP (ref 0–0)
PLATELET # BLD AUTO: 228 K/UL — SIGNIFICANT CHANGE UP (ref 150–400)
POTASSIUM SERPL-MCNC: 3.8 MMOL/L — SIGNIFICANT CHANGE UP (ref 3.5–5.3)
POTASSIUM SERPL-SCNC: 3.8 MMOL/L — SIGNIFICANT CHANGE UP (ref 3.5–5.3)
RBC # BLD: 3.56 M/UL — LOW (ref 3.8–5.2)
RBC # FLD: 14.2 % — SIGNIFICANT CHANGE UP (ref 10.3–14.5)
SARS-COV-2 RNA SPEC QL NAA+PROBE: SIGNIFICANT CHANGE UP
SODIUM SERPL-SCNC: 147 MMOL/L — HIGH (ref 135–145)
WBC # BLD: 8.7 K/UL — SIGNIFICANT CHANGE UP (ref 3.8–10.5)
WBC # FLD AUTO: 8.7 K/UL — SIGNIFICANT CHANGE UP (ref 3.8–10.5)

## 2021-08-01 PROCEDURE — 93005 ELECTROCARDIOGRAM TRACING: CPT

## 2021-08-01 PROCEDURE — 0042T: CPT

## 2021-08-01 PROCEDURE — 71260 CT THORAX DX C+: CPT | Mod: MC

## 2021-08-01 PROCEDURE — 87529 HSV DNA AMP PROBE: CPT

## 2021-08-01 PROCEDURE — U0003: CPT

## 2021-08-01 PROCEDURE — 87641 MR-STAPH DNA AMP PROBE: CPT

## 2021-08-01 PROCEDURE — 70553 MRI BRAIN STEM W/O & W/DYE: CPT

## 2021-08-01 PROCEDURE — 99233 SBSQ HOSP IP/OBS HIGH 50: CPT

## 2021-08-01 PROCEDURE — 84100 ASSAY OF PHOSPHORUS: CPT

## 2021-08-01 PROCEDURE — 80061 LIPID PANEL: CPT

## 2021-08-01 PROCEDURE — 83735 ASSAY OF MAGNESIUM: CPT

## 2021-08-01 PROCEDURE — 80053 COMPREHEN METABOLIC PANEL: CPT

## 2021-08-01 PROCEDURE — 89051 BODY FLUID CELL COUNT: CPT

## 2021-08-01 PROCEDURE — 95716 VEEG EA 12-26HR CONT MNTR: CPT

## 2021-08-01 PROCEDURE — 85025 COMPLETE CBC W/AUTO DIFF WBC: CPT

## 2021-08-01 PROCEDURE — 87070 CULTURE OTHR SPECIMN AEROBIC: CPT

## 2021-08-01 PROCEDURE — 82962 GLUCOSE BLOOD TEST: CPT

## 2021-08-01 PROCEDURE — 84157 ASSAY OF PROTEIN OTHER: CPT

## 2021-08-01 PROCEDURE — 80048 BASIC METABOLIC PNL TOTAL CA: CPT

## 2021-08-01 PROCEDURE — 87205 SMEAR GRAM STAIN: CPT

## 2021-08-01 PROCEDURE — 84484 ASSAY OF TROPONIN QUANT: CPT

## 2021-08-01 PROCEDURE — 82945 GLUCOSE OTHER FLUID: CPT

## 2021-08-01 PROCEDURE — 85027 COMPLETE CBC AUTOMATED: CPT

## 2021-08-01 PROCEDURE — 87635 SARS-COV-2 COVID-19 AMP PRB: CPT

## 2021-08-01 PROCEDURE — 87102 FUNGUS ISOLATION CULTURE: CPT

## 2021-08-01 PROCEDURE — 87086 URINE CULTURE/COLONY COUNT: CPT

## 2021-08-01 PROCEDURE — 83605 ASSAY OF LACTIC ACID: CPT

## 2021-08-01 PROCEDURE — 80177 DRUG SCRN QUAN LEVETIRACETAM: CPT

## 2021-08-01 PROCEDURE — A9585: CPT

## 2021-08-01 PROCEDURE — 71045 X-RAY EXAM CHEST 1 VIEW: CPT

## 2021-08-01 PROCEDURE — 88108 CYTOPATH CONCENTRATE TECH: CPT

## 2021-08-01 PROCEDURE — 36415 COLL VENOUS BLD VENIPUNCTURE: CPT

## 2021-08-01 PROCEDURE — 62328 DX LMBR SPI PNXR W/FLUOR/CT: CPT

## 2021-08-01 PROCEDURE — 83036 HEMOGLOBIN GLYCOSYLATED A1C: CPT

## 2021-08-01 PROCEDURE — 87483 CNS DNA AMP PROBE TYPE 12-25: CPT

## 2021-08-01 PROCEDURE — 87496 CYTOMEG DNA AMP PROBE: CPT

## 2021-08-01 PROCEDURE — 87040 BLOOD CULTURE FOR BACTERIA: CPT

## 2021-08-01 PROCEDURE — 70498 CT ANGIOGRAPHY NECK: CPT | Mod: MC

## 2021-08-01 PROCEDURE — 95713 VEEG 2-12 HR CONT MNTR: CPT

## 2021-08-01 PROCEDURE — 85610 PROTHROMBIN TIME: CPT

## 2021-08-01 PROCEDURE — 99285 EMERGENCY DEPT VISIT HI MDM: CPT | Mod: 25

## 2021-08-01 PROCEDURE — 92610 EVALUATE SWALLOWING FUNCTION: CPT

## 2021-08-01 PROCEDURE — 0225U NFCT DS DNA&RNA 21 SARSCOV2: CPT

## 2021-08-01 PROCEDURE — 87186 SC STD MICRODIL/AGAR DIL: CPT

## 2021-08-01 PROCEDURE — 87116 MYCOBACTERIA CULTURE: CPT

## 2021-08-01 PROCEDURE — 87798 DETECT AGENT NOS DNA AMP: CPT

## 2021-08-01 PROCEDURE — 86787 VARICELLA-ZOSTER ANTIBODY: CPT

## 2021-08-01 PROCEDURE — 87640 STAPH A DNA AMP PROBE: CPT

## 2021-08-01 PROCEDURE — 72158 MRI LUMBAR SPINE W/O & W/DYE: CPT

## 2021-08-01 PROCEDURE — 80164 ASSAY DIPROPYLACETIC ACD TOT: CPT

## 2021-08-01 PROCEDURE — U0005: CPT

## 2021-08-01 PROCEDURE — 87181 SC STD AGAR DILUTION PER AGT: CPT

## 2021-08-01 PROCEDURE — 85730 THROMBOPLASTIN TIME PARTIAL: CPT

## 2021-08-01 PROCEDURE — 81001 URINALYSIS AUTO W/SCOPE: CPT

## 2021-08-01 PROCEDURE — 70496 CT ANGIOGRAPHY HEAD: CPT | Mod: MC

## 2021-08-01 PROCEDURE — 87799 DETECT AGENT NOS DNA QUANT: CPT

## 2021-08-01 PROCEDURE — 82550 ASSAY OF CK (CPK): CPT

## 2021-08-01 PROCEDURE — 70450 CT HEAD/BRAIN W/O DYE: CPT | Mod: MC

## 2021-08-01 RX ORDER — SODIUM CHLORIDE 9 MG/ML
1000 INJECTION, SOLUTION INTRAVENOUS
Refills: 0 | Status: DISCONTINUED | OUTPATIENT
Start: 2021-08-01 | End: 2021-08-01

## 2021-08-01 RX ORDER — VALPROIC ACID (AS SODIUM SALT) 250 MG/5ML
0 SOLUTION, ORAL ORAL
Qty: 0 | Refills: 0 | DISCHARGE
Start: 2021-08-01

## 2021-08-01 RX ADMIN — Medication 2 MILLIGRAM(S): at 09:17

## 2021-08-01 RX ADMIN — GABAPENTIN 100 MILLIGRAM(S): 400 CAPSULE ORAL at 09:17

## 2021-08-01 RX ADMIN — FENTANYL CITRATE 1 PATCH: 50 INJECTION INTRAVENOUS at 17:07

## 2021-08-01 RX ADMIN — LEVETIRACETAM 400 MILLIGRAM(S): 250 TABLET, FILM COATED ORAL at 17:07

## 2021-08-01 RX ADMIN — LEVETIRACETAM 400 MILLIGRAM(S): 250 TABLET, FILM COATED ORAL at 06:49

## 2021-08-01 RX ADMIN — ENOXAPARIN SODIUM 40 MILLIGRAM(S): 100 INJECTION SUBCUTANEOUS at 06:49

## 2021-08-01 RX ADMIN — Medication 750 MILLIGRAM(S): at 06:49

## 2021-08-01 RX ADMIN — SODIUM CHLORIDE 80 MILLILITER(S): 9 INJECTION, SOLUTION INTRAVENOUS at 09:49

## 2021-08-01 RX ADMIN — FENTANYL CITRATE 1 PATCH: 50 INJECTION INTRAVENOUS at 06:50

## 2021-08-01 NOTE — PROGRESS NOTE ADULT - PROVIDER SPECIALTY LIST ADULT
Epilepsy
Internal Medicine
Epilepsy
Epilepsy
Infectious Disease
Internal Medicine
Neurology
Epilepsy
Epilepsy
Infectious Disease
Palliative Care
Internal Medicine
Internal Medicine
Palliative Care
Internal Medicine

## 2021-08-01 NOTE — DISCHARGE NOTE NURSING/CASE MANAGEMENT/SOCIAL WORK - PATIENT PORTAL LINK FT
You can access the FollowMyHealth Patient Portal offered by [facility] by registering at the following website: [email or web address]. By joining [facility]’s FollowMyHealth portal, you will also be able to view your health information using other applications (apps) compatible with our system.

## 2021-08-01 NOTE — PROGRESS NOTE ADULT - NSICDXPILOT_GEN_ALL_CORE
[place]

## 2021-08-01 NOTE — PROGRESS NOTE ADULT - PROBLEM SELECTOR PLAN 10
F: no IVF  E: replete K<4, Mg<2  N: dysphagia 1 pureed    VTE Prophylaxis: Lovenox 40mg   GI: not needed  C: Full Code  D: accepted transfer to [facility] (Dr. Landaverde)--waiting for bed
F: no IVF  E: replete K<4, Mg<2  N: dysphagia 1 pureed    VTE Prophylaxis: Lovenox 40mg   GI: not needed  C: Full Code  D: accepted transfer to [facility] [place] [facility] (Dr. Landaverde)--waiting for bed
F: IVF   E: replete K<4, Mg<2  N: dysphagia 1 pureed    VTE Prophylaxis: Lovenox 40mg   GI: not needed  C: Full Code  D: transfer to [facility]?
F: 1L LR @120cc  E: replete to K>4, Mg>2, Phos>2.5  N: Full liquids for now, Speech and swallow eval  Ppx: Lovenox    Code Status: Full  Emergency Contact: [phone], Daughter (Victor Hugo)    Dispo: TBD
F: no IVF  E: replete K<4, Mg<2  N: dysphagia 1 pureed    VTE Prophylaxis: Lovenox 40mg   GI: not needed  C: Full Code  D: accepted transfer to [facility] [place] (Dr. Landaverde)--waiting for bed
F: tolerating PO, no IVF  E: replete K<4, Mg<2  N: dysphagia 1 pureed    VTE Prophylaxis: Lovenox 40mg   GI: not needed  C: Full Code
F: 1L LR @120cc  E: replete to K>4, Mg>2, Phos>2.5  N: Full liquids for now, Speech and swallow eval  Ppx: Lovenox    Code Status: Full  Emergency Contact: [phone], Daughter (Victor Hugo)    Dispo: TBD

## 2021-08-01 NOTE — PROGRESS NOTE ADULT - PROBLEM SELECTOR PLAN 6
Pt with RVP swab that was positive for coronavirus (non-SARS, non-COVID-19). Pt with nonlabored breathing, O2 [id number].  - c/w supportive care

## 2021-08-01 NOTE — PROGRESS NOTE ADULT - ASSESSMENT
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 Patient is being admitted for monitoring and workup for reversible causes of AMS. Given negative infectious workup thus far, etiology is more likely 2/2 melanoma brain metastasis. Given this and that Pt[place] [facility], pt will have care transferred to [facility] (accepted, awaiting bed).

## 2021-08-01 NOTE — PROGRESS NOTE ADULT - PROBLEM SELECTOR PLAN 2
Pt met criteria for severe sepsis following admission with unknown source of infection. WBC normal. BCx 7/25. 7/29 NG. Intermittent fevers, more likely 2/2 malignancy than infection given infectious workup neg. Persistently tachycardic w/ b/l LE edema (L>R?), no SOB, no obvious CP. EKG sinus tachycardia. Pt family reported that she had dopplers last week at [facility] and was neg, they did not want to do dopplers here.  - s/p meropenem --discontinued due to interaction w/ valproic acid (inc seizure risk) and l/s for community acquired bacterial meningitis (per ID recs)  - acyclovir discontinued as LP not indicative of infection and VZV/HSV neg  - BCx 7/29 NGTD  - CSF Cx NGTD

## 2021-08-01 NOTE — PROGRESS NOTE ADULT - PROBLEM SELECTOR PLAN 3
Pt w/ metastatic melanoma, with poor prognosis. Palliative consulted. Family understands meaning of prognosis but wishes to continue workup , as per Palliative.  - c/w pain control with morphine, fentanyl patch, lidoderm, gabapentin

## 2021-08-01 NOTE — PROGRESS NOTE ADULT - SUBJECTIVE AND OBJECTIVE BOX
INTERVAL HPI/OVERNIGHT EVENTS:  Patient interactive. Reports no complaints.     VITAL SIGNS:  T(F): 98.3 (07-31-21 @ 05:33)  HR: 90 (07-31-21 @ 05:33)  BP: 117/77 (07-31-21 @ 05:33)  RR: 18 (07-31-21 @ 05:33)  SpO2: 98% (07-31-21 @ 05:33)  Wt(kg): --    PHYSICAL EXAM:  Constitutional: Well nourished  female, resting comfrotably, no acute distress  HEENT: PERRL, sclera non-icteric, no lymphadenopathy, no JVD, dry mucous membranes  Respiratory: clear to auscultation bilaterally, no wheezing, without accessory muscle use  Cardiovascular: RRR, normal S1S2, no M/R/G  Gastrointestinal: soft, NTND, no masses palpable, BS normal  Extremities: Warm, well perfused, radial pulses equal bilateraly, no edema  Neurological: AAOx1 to person, no FND  Skin: Normal temperature, warm, dry    MEDICATIONS  (STANDING):  dexAMETHasone  Injectable 2 milliGRAM(s) IV Push every 12 hours  dextrose 40% Gel 15 Gram(s) Oral once  dextrose 5%. 1000 milliLiter(s) (100 mL/Hr) IV Continuous <Continuous>  dextrose 5%. 1000 milliLiter(s) (50 mL/Hr) IV Continuous <Continuous>  dextrose 50% Injectable 25 Gram(s) IV Push once  dextrose 50% Injectable 12.5 Gram(s) IV Push once  dextrose 50% Injectable 25 Gram(s) IV Push once  enoxaparin Injectable 40 milliGRAM(s) SubCutaneous every 24 hours  fentaNYL   Patch  12 MICROgram(s)/Hr 1 Patch Transdermal every 72 hours  gabapentin 100 milliGRAM(s) Oral every 12 hours  glucagon  Injectable 1 milliGRAM(s) IntraMuscular once  insulin lispro (ADMELOG) corrective regimen sliding scale   SubCutaneous Before meals and at bedtime  levETIRAcetam  IVPB 1000 milliGRAM(s) IV Intermittent every 12 hours  lidocaine   5% Patch 1 Patch Transdermal every 24 hours  melatonin 3 milliGRAM(s) Oral at bedtime  mirtazapine 15 milliGRAM(s) Oral at bedtime  senna 1 Tablet(s) Oral at bedtime  valproate sodium IVPB 750 milliGRAM(s) IV Intermittent every 12 hours    MEDICATIONS  (PRN):  acetaminophen   Tablet .. 650 milliGRAM(s) Oral every 6 hours PRN Temp greater or equal to 38.5C (101.3F), Mild Pain (1 - 3)  hydrocortisone 2.5% Ointment 1 Application(s) Topical three times a day PRN Itching  morphine  - Injectable 4 milliGRAM(s) IV Push every 4 hours PRN Severe Pain (7 - 10)      Allergies    crawfish (Anaphylaxis)  Keflex (Anaphylaxis)  Kiwi (Anaphylaxis)  penicillin (Anaphylaxis)  tetracycline (Anaphylaxis)    Intolerances        LABS:                        9.1    9.06  )-----------( 211      ( 31 Jul 2021 06:08 )             28.5     07-31    145  |  118<H>  |  19  ----------------------------<  103<H>  3.5   |  17<L>  |  0.66    Ca    9.9      31 Jul 2021 06:08  Mg     2.3     07-31              RADIOLOGY & ADDITIONAL TESTS:  Reviewed

## 2021-08-03 PROBLEM — Z00.00 ENCOUNTER FOR PREVENTIVE HEALTH EXAMINATION: Status: ACTIVE | Noted: 2021-08-03

## 2021-08-03 LAB
CULTURE RESULTS: SIGNIFICANT CHANGE UP
SPECIMEN SOURCE: SIGNIFICANT CHANGE UP

## 2021-08-05 DIAGNOSIS — R41.4 NEUROLOGIC NEGLECT SYNDROME: ICD-10-CM

## 2021-08-05 DIAGNOSIS — Z88.0 ALLERGY STATUS TO PENICILLIN: ICD-10-CM

## 2021-08-05 DIAGNOSIS — N17.9 ACUTE KIDNEY FAILURE, UNSPECIFIED: ICD-10-CM

## 2021-08-05 DIAGNOSIS — R41.82 ALTERED MENTAL STATUS, UNSPECIFIED: ICD-10-CM

## 2021-08-05 DIAGNOSIS — C79.89 SECONDARY MALIGNANT NEOPLASM OF OTHER SPECIFIED SITES: ICD-10-CM

## 2021-08-05 DIAGNOSIS — C77.0 SECONDARY AND UNSPECIFIED MALIGNANT NEOPLASM OF LYMPH NODES OF HEAD, FACE AND NECK: ICD-10-CM

## 2021-08-05 DIAGNOSIS — Z51.5 ENCOUNTER FOR PALLIATIVE CARE: ICD-10-CM

## 2021-08-05 DIAGNOSIS — G89.3 NEOPLASM RELATED PAIN (ACUTE) (CHRONIC): ICD-10-CM

## 2021-08-05 DIAGNOSIS — G51.0 BELL'S PALSY: ICD-10-CM

## 2021-08-05 DIAGNOSIS — C43.9 MALIGNANT MELANOMA OF SKIN, UNSPECIFIED: ICD-10-CM

## 2021-08-05 DIAGNOSIS — R29.716 NIHSS SCORE 16: ICD-10-CM

## 2021-08-05 DIAGNOSIS — Z20.822 CONTACT WITH AND (SUSPECTED) EXPOSURE TO COVID-19: ICD-10-CM

## 2021-08-05 DIAGNOSIS — Z79.891 LONG TERM (CURRENT) USE OF OPIATE ANALGESIC: ICD-10-CM

## 2021-08-05 DIAGNOSIS — Z92.3 PERSONAL HISTORY OF IRRADIATION: ICD-10-CM

## 2021-08-05 DIAGNOSIS — I21.A1 MYOCARDIAL INFARCTION TYPE 2: ICD-10-CM

## 2021-08-05 DIAGNOSIS — Z79.01 LONG TERM (CURRENT) USE OF ANTICOAGULANTS: ICD-10-CM

## 2021-08-05 DIAGNOSIS — Z91.013 ALLERGY TO SEAFOOD: ICD-10-CM

## 2021-08-05 DIAGNOSIS — C78.00 SECONDARY MALIGNANT NEOPLASM OF UNSPECIFIED LUNG: ICD-10-CM

## 2021-08-05 DIAGNOSIS — B02.30 ZOSTER OCULAR DISEASE, UNSPECIFIED: ICD-10-CM

## 2021-08-05 DIAGNOSIS — C77.3 SECONDARY AND UNSPECIFIED MALIGNANT NEOPLASM OF AXILLA AND UPPER LIMB LYMPH NODES: ICD-10-CM

## 2021-08-05 DIAGNOSIS — C79.31 SECONDARY MALIGNANT NEOPLASM OF BRAIN: ICD-10-CM

## 2021-08-05 DIAGNOSIS — F41.9 ANXIETY DISORDER, UNSPECIFIED: ICD-10-CM

## 2021-08-05 DIAGNOSIS — R53.2 FUNCTIONAL QUADRIPLEGIA: ICD-10-CM

## 2021-08-05 DIAGNOSIS — R13.10 DYSPHAGIA, UNSPECIFIED: ICD-10-CM

## 2021-08-05 DIAGNOSIS — Z86.711 PERSONAL HISTORY OF PULMONARY EMBOLISM: ICD-10-CM

## 2021-08-05 DIAGNOSIS — R56.9 UNSPECIFIED CONVULSIONS: ICD-10-CM

## 2021-08-05 DIAGNOSIS — C79.51 SECONDARY MALIGNANT NEOPLASM OF BONE: ICD-10-CM

## 2021-08-05 DIAGNOSIS — B34.2 CORONAVIRUS INFECTION, UNSPECIFIED: ICD-10-CM

## 2021-08-05 DIAGNOSIS — Z91.018 ALLERGY TO OTHER FOODS: ICD-10-CM

## 2021-08-05 DIAGNOSIS — G47.00 INSOMNIA, UNSPECIFIED: ICD-10-CM

## 2021-08-12 LAB
CULTURE RESULTS: NO GROWTH — SIGNIFICANT CHANGE UP
SPECIMEN SOURCE: SIGNIFICANT CHANGE UP

## 2021-08-28 LAB
CULTURE RESULTS: SIGNIFICANT CHANGE UP
SPECIMEN SOURCE: SIGNIFICANT CHANGE UP

## 2021-09-18 LAB
CULTURE RESULTS: SIGNIFICANT CHANGE UP
SPECIMEN SOURCE: SIGNIFICANT CHANGE UP

## 2021-11-10 NOTE — PROGRESS NOTE ADULT - SUBJECTIVE AND OBJECTIVE BOX
[address] [address] [address]  Dept: [phone]  Dept Fax: [phone]    History and Physical  Patient:  Bertha Orozco  YOB: 1999  Date of Service:  11/10/2021    Subjective:   Bertha Orozco (:  1999) is a 25 y.o. male, Established patient, here for evaluation of the following chief complaint(s):    Chief Complaint   Patient presents with    Hand Pain     states right hand has been having ongoing issues was seen virtually and given abx but has not completely healed still has swollen around thumb area denies any pain       HPI  Patient initially evaluated by Narinder Ruffin through virtual visit. He was placed on doxycycline, and Bactrim. Patient reports lesion has not changed much since completing the antibiotics. It actually seems to be getting larger    The ASCVD Risk score (Lester Hernandez., et al., 2013) failed to calculate for the following reasons: The 2013 ASCVD risk score is only valid for ages 36 to 78     BP Readings from Last 3 Encounters:   11/10/21 120/80   18 116/62   17 100/80 (4 %, Z = -1.71 /  87 %, Z = 1.11)*     *BP percentiles are based on the 2017 AAP Clinical Practice Guideline for boys      Pulse Readings from Last 3 Encounters:   11/10/21 78   18 75   17 72      Wt Readings from Last 3 Encounters:   11/10/21 168 lb 12.8 oz (76.6 kg)   18 159 lb (72.1 kg) (63 %, Z= 0.34)*   17 153 lb (69.4 kg) (63 %, Z= 0.34)*     * Growth percentiles are based on [facility] (Boys, 2-20 Years) data. Allergies   Allergen Reactions    Penicillins        Current Outpatient Medications   Medication Sig Dispense Refill    nystatin-triamcinolone (MYCOLOG II) 194419-8.1 UNIT/GM-% cream Apply topically 4 times daily.  1 each 2    ibuprofen (ADVIL;MOTRIN) 200 MG tablet Take 200 mg by mouth every 6 hours as needed for Pain      polyethylene glycol (GLYCOLAX) powder Take 17 g by mouth Infectious Diseases Progress Note:  **INCOMPLETE NOTE**  SUBJECTIVE: Patient seen and examined at bedside. Patient denies fever, chills, HA, nausea, vomiting, abdominal pain, dysuria, diarrhea.    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    Goals of care, counseling/discussion    Full code status    Encounter for palliative care    Cancer related pain    History of seizures    History of pulmonary embolism    Herpes zoster ophthalmicus    NSTEMI (non-ST elevated myocardial infarction)        Allergies    crawfish (Anaphylaxis)  Keflex (Anaphylaxis)  Kiwi (Anaphylaxis)  penicillin (Anaphylaxis)  tetracycline (Anaphylaxis)    Intolerances        ANTIBIOTICS/RELEVANT:  antimicrobials  acyclovir IVPB 800 milliGRAM(s) IV Intermittent every 12 hours  meropenem  IVPB 2000 milliGRAM(s) IV Intermittent every 12 hours    immunologic:      OTHER:  acetaminophen   Tablet .. 650 milliGRAM(s) Oral every 6 hours PRN  gabapentin 100 milliGRAM(s) Oral three times a day  hydrocortisone 2.5% Ointment 1 Application(s) Topical three times a day PRN  levETIRAcetam  IVPB 1000 milliGRAM(s) IV Intermittent every 12 hours  melatonin 3 milliGRAM(s) Oral at bedtime  midodrine 10 milliGRAM(s) Oral every 8 hours  mirtazapine 15 milliGRAM(s) Oral at bedtime  senna 1 Tablet(s) Oral at bedtime  valproate sodium IVPB 500 milliGRAM(s) IV Intermittent every 12 hours      Objective:  Vital Signs Last 24 Hrs  T(C): 37.1 (27 Jul 2021 04:43), Max: 39.4 (26 Jul 2021 11:46)  T(F): 98.7 (27 Jul 2021 04:43), Max: 103 (26 Jul 2021 11:46)  HR: 112 (27 Jul 2021 04:43) (102 - 119)  BP: 108/72 (27 Jul 2021 04:43) (83/55 - 108/72)  BP(mean): --  RR: 18 (27 Jul 2021 04:43) (16 - 18)  SpO2: 100% (27 Jul 2021 04:43) (96% - 100%)    PHYSICAL EXAM:  Constitutional: Well-developed, well nourished  Eyes: PERRLA, EOMI  Ear/Nose/Throat: no oral lesion, no sinus tenderness on percussion	  Neck:no JVD, no lymphadenopathy, supple  Respiratory: CTA bilateral  Cardiovascular: S1S2, RRR, no murmurs  Gastrointestinal: soft, NTND, (+) BS, no HSM  Extremities: no c/e/e  Skin: no rashes    LABS:                        10.9   8.90  )-----------( 167      ( 27 Jul 2021 07:53 )             35.7     07-26    140  |  110<H>  |  21  ----------------------------<  102<H>  3.2<L>   |  19<L>  |  1.04    Ca    9.2      26 Jul 2021 15:29  Phos  3.9     07-26  Mg     2.1     07-26    TPro  4.9<L>  /  Alb  2.3<L>  /  TBili  0.3  /  DBili  x   /  AST  20  /  ALT  8<L>  /  AlkPhos  85  07-26    PT/INR - ( 25 Jul 2021 08:45 )   PT: 14.1 sec;   INR: 1.18          PTT - ( 25 Jul 2021 08:45 )  PTT:33.1 sec  Urinalysis Basic - ( 25 Jul 2021 08:51 )    Color: x / Appearance: x / SG: x / pH: x  Gluc: x / Ketone: x  / Bili: x / Urobili: x   Blood: x / Protein: x / Nitrite: x   Leuk Esterase: x / RBC: < 5 /HPF / WBC 5-10 /HPF   Sq Epi: x / Non Sq Epi: 0-5 /HPF / Bacteria: Present /HPF      MICROBIOLOGY:  Respiratory Viral Panel with COVID-19 by BEENA (07.26.21 @ 18:52)    Rapid RVP Result: Detected    SARS-CoV-2: NotDetec: This Respiratory Panel uses polymerase chain reaction (PCR) to detect for  adenovirus; coronavirus (HKU1, NL63, 229E, OC43); human metapneumovirus  (hMPV); human enterovirus/rhinovirus (Entero/RV); influenza A; influenza  A/H1; influenza A/H3; influenza A/H1-2009; influenza B; parainfluenza  viruses 1, 2, 3, 4; respiratory syncytial virus; Mycoplasma pneumoniae;  Chlamydophila pneumoniae; and SARS-CoV-2.    Culture - Urine (07.25.21 @ 10:54)    Specimen Source: .Urine None    Culture Results:   >100,000 CFU/ml Enterococcus faecium  Susceptibility to follow.    Culture - Blood (07.25.21 @ 08:54)    Specimen Source: .Blood Blood-Peripheral    Culture Results:   No growth at 1 day.    RADIOLOGY & ADDITIONAL STUDIES:  < from: CT Chest w/ IV Cont (07.24.21 @ 17:17) >  1. There is pulmonary metastatic disease and there are metastases involving left supraclavicular and bilateral axillary lymph nodes, in addition to soft tissue metastases in the shoulder musculature bilaterally. Recommend PET/CT to try to determine the site of primary malignancy. Considerations include lung cancer, breast cancer, metastatic melanoma and renal cell carcinoma.    2. Low-density lesion right lobe of liver adjacent to infiltration of subcutaneous fat in the right lateral chest wall. Recommend clinical correlation to determine if there has been trauma to this region.    3. Multinodular thyroid.    4. Small right pleural effusion.    < from: CT Angio Head w/ IV Cont (07.24.21 @ 17:15) >  1. Postoperative changes in the right frontal lobe. Mild leptomeningeal enhancement may be present along the right frontal convexity. An MRI with contrast is recommended to exclude any underlying residual/recurrent malignancy.  2. No large vessel occlusion    < from: CT Brain Stroke Protocol (07.24.21 @ 17:11) >  No acute intracranial hemorrhage or transcortical infarction.    Status post right frontal craniotomywith subjacent right frontal lobe encephalomalacic changes.         daily (Patient not taking: Reported on 8/27/2021)      docusate sodium (COLACE) 100 MG capsule Take 100 mg by mouth 2 times daily (Patient not taking: Reported on 8/27/2021)       No current facility-administered medications for this visit. Past Medical History:   Diagnosis Date    Asthma     childhood-pt has outgrown       Past Surgical History:   Procedure Laterality Date    ADENOIDECTOMY      KNEE SURGERY Right 2015    meniscus repair    TONSILLECTOMY      TYMPANOSTOMY TUBE PLACEMENT Bilateral      No family history on file. Review of Systems:     Review of Systems   Constitutional: Negative for chills, fatigue and fever. Respiratory: Negative for cough and shortness of breath. Skin: Positive for rash (right hand). PHQ Scores 8/27/2021 8/6/2018 6/6/2017 6/6/2017   PHQ2 Score 0 0 0 0   PHQ9 Score 0 0 0 0     Interpretation of Total Score Depression Severity: 1-4 = Minimal depression, 5-9 = Mild depression, 10-14 = Moderate depression, 15-19 = Moderately severe depression, 20-27 = Severe depression     Physical Exam:     Vitals:    11/10/21 1024   BP: 120/80   Pulse: 78   SpO2: 97%   Weight: 168 lb 12.8 oz (76.6 kg)      Body mass index is 24.57 kg/m². Physical Exam  Constitutional:       Appearance: Normal appearance. HENT:      Head: Normocephalic. Cardiovascular:      Rate and Rhythm: Normal rate and regular rhythm. Pulmonary:      Effort: Pulmonary effort is normal.      Breath sounds: Normal breath sounds. No wheezing. Musculoskeletal:        Hands:       Cervical back: Neck supple. Neurological:      Mental Status: He is alert. Assessment/Plan:   1. Skin lesion of hand  -     nystatin-triamcinolone (MYCOLOG II) 523871-9.1 UNIT/GM-% cream; Apply topically 4 times daily. , Disp-1 each, R-2, Normal     All patient questions answered. Patient agreed with treatment plan. Follow up as directed. Return if symptoms worsen or fail to improve.     Please note that this chart was generated using voice recognition Dragon dictation software. Although every effort was made to ensure the accuracy of this automated transcription, some errors in transcription may have occurred.     Electronically signed by Sara Duverney, APRN - CNP on 11/14/2021 Infectious Diseases Progress Note:    SUBJECTIVE: Patient seen and examined at bedside. Appears drowsy but well. Reports no active complaints. Denies fever, chills, HA, nausea, vomiting, abdominal pain, dysuria, diarrhea.    WEAKNESS METASTATIC MELANOMA      Pulmonary thromboembolism    Altered mental status    Seizures    Pulmonary embolism    Chronic back pain    Hypertension    ELISABETH (acute kidney injury)    Nutrition, metabolism, and development symptoms    Insomnia    Anxiety    Metastatic melanoma    Neoplasm related pain    Functional quadriplegia    Encephalopathy due to structural disorder of brain    Goals of care, counseling/discussion    Full code status    Encounter for palliative care    Cancer related pain    History of seizures    History of pulmonary embolism    Herpes zoster ophthalmicus    NSTEMI (non-ST elevated myocardial infarction)        Allergies  crawfish (Anaphylaxis)  Keflex (Anaphylaxis)  Kiwi (Anaphylaxis)  penicillin (Anaphylaxis)  tetracycline (Anaphylaxis)    Intolerances    ANTIBIOTICS/RELEVANT:  antimicrobials  acyclovir IVPB 800 milliGRAM(s) IV Intermittent every 12 hours  meropenem  IVPB 2000 milliGRAM(s) IV Intermittent every 12 hours    immunologic:    OTHER:  acetaminophen   Tablet .. 650 milliGRAM(s) Oral every 6 hours PRN  gabapentin 100 milliGRAM(s) Oral three times a day  hydrocortisone 2.5% Ointment 1 Application(s) Topical three times a day PRN  levETIRAcetam  IVPB 1000 milliGRAM(s) IV Intermittent every 12 hours  melatonin 3 milliGRAM(s) Oral at bedtime  midodrine 10 milliGRAM(s) Oral every 8 hours  mirtazapine 15 milliGRAM(s) Oral at bedtime  senna 1 Tablet(s) Oral at bedtime  valproate sodium IVPB 500 milliGRAM(s) IV Intermittent every 12 hours      Objective:  Vital Signs Last 24 Hrs  T(C): 37.1 (27 Jul 2021 04:43), Max: 39.4 (26 Jul 2021 11:46)  T(F): 98.7 (27 Jul 2021 04:43), Max: 103 (26 Jul 2021 11:46)  HR: 112 (27 Jul 2021 04:43) (102 - 119)  BP: 108/72 (27 Jul 2021 04:43) (83/55 - 108/72)  BP(mean): --  RR: 18 (27 Jul 2021 04:43) (16 - 18)  SpO2: 100% (27 Jul 2021 04:43) (96% - 100%)    PHYSICAL EXAM:  Constitutional: Well-developed, well nourished	  Respiratory: CTA bilateral  Cardiovascular: S1S2, RRR, no murmurs  Gastrointestinal: soft, NTND, (+) BS, no HSM  Extremities: no c/e/e  Skin: no rashes  Neuro: A&Ox3    LABS:                        10.9   8.90  )-----------( 167      ( 27 Jul 2021 07:53 )             35.7     07-26    140  |  110<H>  |  21  ----------------------------<  102<H>  3.2<L>   |  19<L>  |  1.04    Ca    9.2      26 Jul 2021 15:29  Phos  3.9     07-26  Mg     2.1     07-26    TPro  4.9<L>  /  Alb  2.3<L>  /  TBili  0.3  /  DBili  x   /  AST  20  /  ALT  8<L>  /  AlkPhos  85  07-26    PT/INR - ( 25 Jul 2021 08:45 )   PT: 14.1 sec;   INR: 1.18        PTT - ( 25 Jul 2021 08:45 )  PTT:33.1 sec    Urinalysis Basic - ( 25 Jul 2021 08:51 )  Color: x / Appearance: x / SG: x / pH: x  Gluc: x / Ketone: x  / Bili: x / Urobili: x   Blood: x / Protein: x / Nitrite: x   Leuk Esterase: x / RBC: < 5 /HPF / WBC 5-10 /HPF   Sq Epi: x / Non Sq Epi: 0-5 /HPF / Bacteria: Present /HPF    MICROBIOLOGY:  Respiratory Viral Panel with COVID-19 by BEENA (07.26.21 @ 18:52)    Rapid RVP Result: Detected    SARS-CoV-2: NotDetec: This Respiratory Panel uses polymerase chain reaction (PCR) to detect for  adenovirus; coronavirus (HKU1, NL63, 229E, OC43); human metapneumovirus  (hMPV); human enterovirus/rhinovirus (Entero/RV); influenza A; influenza  A/H1; influenza A/H3; influenza A/H1-2009; influenza B; parainfluenza  viruses 1, 2, 3, 4; respiratory syncytial virus; Mycoplasma pneumoniae;  Chlamydophila pneumoniae; and SARS-CoV-2.    Culture - Urine (07.25.21 @ 10:54)    Specimen Source: .Urine None    Culture Results:   >100,000 CFU/ml Enterococcus faecium  Susceptibility to follow.    Culture - Blood (07.25.21 @ 08:54)    Specimen Source: .Blood Blood-Peripheral    Culture Results:   No growth at 1 day.    RADIOLOGY & ADDITIONAL STUDIES:  < from: CT Chest w/ IV Cont (07.24.21 @ 17:17) >  1. There is pulmonary metastatic disease and there are metastases involving left supraclavicular and bilateral axillary lymph nodes, in addition to soft tissue metastases in the shoulder musculature bilaterally. Recommend PET/CT to try to determine the site of primary malignancy. Considerations include lung cancer, breast cancer, metastatic melanoma and renal cell carcinoma.  2. Low-density lesion right lobe of liver adjacent to infiltration of subcutaneous fat in the right lateral chest wall. Recommend clinical correlation to determine if there has been trauma to this region.  3. Multinodular thyroid.  4. Small right pleural effusion.    < from: CT Angio Head w/ IV Cont (07.24.21 @ 17:15) >  1. Postoperative changes in the right frontal lobe. Mild leptomeningeal enhancement may be present along the right frontal convexity. An MRI with contrast is recommended to exclude any underlying residual/recurrent malignancy.  2. No large vessel occlusion    < from: CT Brain Stroke Protocol (07.24.21 @ 17:11) >  No acute intracranial hemorrhage or transcortical infarction.  Status post right frontal craniotomy with subjacent right frontal lobe encephalomalacic changes.

## 2023-02-03 NOTE — PROGRESS NOTE ADULT - PROBLEM SELECTOR PROBLEM 3
[EKG obtained to assist in diagnosis and management of assessed problem(s)] : EKG obtained to assist in diagnosis and management of assessed problem(s) [FreeTextEntry1] : Impression:\par \par 1. Sinus bradycardia: EKG performed today to assess for presence of conduction disease and reveals NSR. ECHO with normal LVEF. Review of hospitalization with noted sinus bradycardia on telemetry when sleeping, baseline NSR in the 80s. Aricept now discontinued. Remains off AV nodals. No indication for PPM placement. From a Cardiac/EP standpoint, she is of acceptable risk to undergo ERCP stent removal as scheduled. \par \par 2. HTN: resume oral antihypertensives as prescribed. Encouraged heart healthy diet, sodium restriction, and weight loss. Continue regular f/u with Cardiologist for further HTN management.\par \par 3. HLD: resume statin therapy as prescribed and regular f/u with Cardiologist for routine lipid monitoring and management.\par \par Will continue f/u with Cardiologist and may RTO as needed or if any new or worsening symptoms or findings occur. Metastatic melanoma

## 2023-11-29 NOTE — DIETITIAN INITIAL EVALUATION ADULT. - SIGNS/SYMPTOMS
AEB meeting <75% EER at present [facility] ([facility])             [address], [address]                        Telephone [phone]             Fax [phone]       Charisse Al  :  1948  Age:  77 y.o. MRN:  [id number]  Date of visit:  2023       Assessment and Plan:    Abnormal tympanic membrane, left  She was referred to ENT:  - External Referral To ENT          Follow up instructions were given to the patient:  Return if symptoms worsen or fail to improve. Subjective:    Charisse Al is a 77 y.o. female who presents to [facility] ([facility]) today (2023) for follow up/evaluation of:  Otalgia (Left ear pain/fullness) and Sinus Problem (Started on . Was given Zpac. Has taken Zyrtec and used flonase)      At her visit on 2023, she reported left ear fullness and decreased hearing. A Zpak was prescribed. She states that her symptoms have not improved. She denies fever. She denies ear pain. She has received three doses of the Pfizer Covid-19 vaccine, and one dose of the [employer] vaccine. The most recent dose was 2022.        Immunization history was reviewed:  Immunization History   Administered Date(s) Administered    COVID-19, MODERNA Bivalent, (age 12y+), IM, 48 mcg/0.5 mL 2022    COVID-19, PFIZER PURPLE top, DILUTE for use, (age 15 y+), 30mcg/0.3mL 2021, 2021, 10/04/2021    DT (pediatric) 10/19/2000    Influenza, FLUAD, (age 72 y+), Adjuvanted, 0.5mL 2020, 09/15/2021, 2022    Influenza, FLUZONE (age 72 y+), High Dose, 0.7mL 2023    Influenza, High Dose (Fluzone 65 yrs and older) 10/26/2017, 2018    Influenza, Triv, inactivated, subunit, adjuvanted, IM (Fluad 65 yrs and older) 10/11/2019    Pneumococcal, PCV-13, PREVNAR 15, (age 6w+), IM, 0.5mL 2017    Pneumococcal, PPSV23, PNEUMOVAX 23, (age 2y+), SC/IM, 0.5mL 2018    TDaP, ADACEL (age

## 2024-03-06 NOTE — PROGRESS NOTE ADULT - PROBLEM SELECTOR PLAN 4
[facility]  History and Physical      CHIEF COMPLAINT: Generalized weakness    Reason for Admission: Influenza illness, hypoxia     History Obtained From: Patient    PCP :  Ant Dahl MD    [address] [facility] [address] / [place] [id number]      HISTORY OF PRESENT ILLNESS:      The patient is a 76 y.o. male presented emergency room with fatigue.  Patient reports that his wife really wanted him checked.  Patient also has been having intermittent hypoglycemia at home.  Apparently he has not been eating.  Recently had endovascular repair for aortic tear/dissection.  In emergency room patient was noted to have influenza.  Patient was also hypoxic.  He needed oxygen supplementation.  Overnight his oxygen has been weaned off.  At the time of my questioning patient is feeling better.  He would like to go home.    Past Medical History:        Diagnosis Date    A-fib ([facility])     for cardioversion 3-12-21     CAD (coronary artery disease) 10/27/2011    Dr. Guy     Carotid stenosis, right 05/06/2021    Chronic venous insufficiency 03/02/2017    COVID-19 01/2021    resolved as of 3-3-21     Decreased pulses in feet     Diabetes mellitus ([facility])     DM (diabetes mellitus), type 2 ([facility]) 10/27/2011    ED (erectile dysfunction)     Gout 10/31/2011    Heart attack ([facility])     HTN (hypertension) 10/27/2011    Hyperlipidemia 06/07/2012    Hypertension     Inguinal hernia     bilateral    Lateral myocardial infarction ([facility]) 10/2008    due to circumflex occlusion    Leg swelling 03/02/2017     Past Surgical History:        Procedure Laterality Date    CARDIAC CATHETERIZATION      CARDIAC CATHETERIZATION  06/16/2021    DR. CANDELARIA [facility] EF 45% CTS CONSULT    CARDIOVASCULAR STRESS TEST  12/14/2009    neither fixed nor reversible perfusion defect; LVEF 50%    CARDIOVERSION      CATARACT REMOVAL Left     CHOLECYSTECTOMY  1999    CORONARY ARTERY BYPASS GRAFT Left 07/09/2021    CABG CORONARY ARTERY BYPASS, MAZE, LEFT  Pt w/ metastatic melanoma, with poor prognosis. Palliative consulted. Family understands meaning of prognosis but wishes to continue workup , as per Palliative.  - f/u Palliative recs and GOC  - Dr. Salas open to being involved in GOC discussion  - c/w pain control with morphine, fentanyl patch, lidoderm, gabapentin  - Pt family expressed wishes to transfer to [facility] where most of Pt's physicians are

## 2024-10-18 NOTE — PROGRESS NOTE ADULT - PROBLEM SELECTOR PROBLEM 7
Hypertension Siliq Pregnancy And Lactation Text: The risk during pregnancy and breastfeeding is uncertain with this medication. Zoryve Counseling:  I discussed with the patient that Zoryve is not for use in the eyes, mouth or vagina. The most commonly reported side effects include diarrhea, headache, insomnia, application site pain, upper respiratory tract infections, and urinary tract infections.  All of the patient's questions and concerns were addressed. Include Pregnancy/Lactation Warning?: No Spironolactone Counseling: Patient advised regarding risks of diarrhea, abdominal pain, hyperkalemia, birth defects (for female patients), liver toxicity and renal toxicity. The patient may need blood work to monitor liver and kidney function and potassium levels while on therapy. The patient verbalized understanding of the proper use and possible adverse effects of spironolactone.  All of the patient's questions and concerns were addressed. Minoxidil Pregnancy And Lactation Text: This medication has not been assigned a Pregnancy Risk Category but animal studies failed to show danger with the topical medication. It is unknown if the medication is excreted in breast milk. Clindamycin Counseling: I counseled the patient regarding use of clindamycin as an antibiotic for prophylactic and/or therapeutic purposes. Clindamycin is active against numerous classes of bacteria, including skin bacteria. Side effects may include nausea, diarrhea, gastrointestinal upset, rash, hives, yeast infections, and in rare cases, colitis. Qbrexza Counseling:  I discussed with the patient the risks of Qbrexza including but not limited to headache, mydriasis, blurred vision, dry eyes, nasal dryness, dry mouth, dry throat, dry skin, urinary hesitation, and constipation.  Local skin reactions including erythema, burning, stinging, and itching can also occur. Oral Minoxidil Pregnancy And Lactation Text: This medication should only be used when clearly needed if you are pregnant, attempting to become pregnant or breast feeding. Thalidomide Counseling: I discussed with the patient the risks of thalidomide including but not limited to birth defects, anxiety, weakness, chest pain, dizziness, cough and severe allergy. Clofazimine Counseling:  I discussed with the patient the risks of clofazimine including but not limited to skin and eye pigmentation, liver damage, nausea/vomiting, gastrointestinal bleeding and allergy. Low Dose Naltrexone Counseling- I discussed with the patient the potential risks and side effects of low dose naltrexone including but not limited to: more vivid dreams, headaches, nausea, vomiting, abdominal pain, fatigue, dizziness, and anxiety. Aklief Pregnancy And Lactation Text: It is unknown if this medication is safe to use during pregnancy.  It is unknown if this medication is excreted in breast milk.  Breastfeeding women should use the topical cream on the smallest area of the skin for the shortest time needed while breastfeeding.  Do not apply to nipple and areola. Albendazole Counseling:  I discussed with the patient the risks of albendazole including but not limited to cytopenia, kidney damage, nausea/vomiting and severe allergy.  The patient understands that this medication is being used in an off-label manner. Cyclosporine Pregnancy And Lactation Text: This medication is Pregnancy Category C and it isn't know if it is safe during pregnancy. This medication is excreted in breast milk. Quinolones Counseling:  I discussed with the patient the risks of fluoroquinolones including but not limited to GI upset, allergic reaction, drug rash, diarrhea, dizziness, photosensitivity, yeast infections, liver function test abnormalities, tendonitis/tendon rupture. Cantharidin Counseling:  I discussed with the patient the risks of Cantharidin including but not limited to pain, redness, burning, itching, and blistering. Eucrisa Counseling: Patient may experience a mild burning sensation during topical application. Eucrisa is not approved in children less than 3 months of age. Hyrimoz Counseling:  I discussed with the patient the risks of adalimumab including but not limited to myelosuppression, immunosuppression, autoimmune hepatitis, demyelinating diseases, lymphoma, and serious infections.  The patient understands that monitoring is required including a PPD at baseline and must alert us or the primary physician if symptoms of infection or other concerning signs are noted. Topical Steroids Applications Pregnancy And Lactation Text: Most topical steroids are considered safe to use during pregnancy and lactation.  Any topical steroid applied to the breast or nipple should be washed off before breastfeeding. Cantharidin Pregnancy And Lactation Text: This medication has not been proven safe during pregnancy. It is unknown if this medication is excreted in breast milk. Cimzia Pregnancy And Lactation Text: This medication crosses the placenta but can be considered safe in certain situations. Cimzia may be excreted in breast milk. Tazorac Counseling:  Patient advised that medication is irritating and drying.  Patient may need to apply sparingly and wash off after an hour before eventually leaving it on overnight.  The patient verbalized understanding of the proper use and possible adverse effects of tazorac.  All of the patient's questions and concerns were addressed. Olumiant Counseling: I discussed with the patient the risks of Olumiant therapy including but not limited to upper respiratory tract infections, shingles, cold sores, and nausea. Live vaccines should be avoided.  This medication has been linked to serious infections; higher rate of mortality; malignancy and lymphoproliferative disorders; major adverse cardiovascular events; thrombosis; gastrointestinal perforations; neutropenia; lymphopenia; anemia; liver enzyme elevations; and lipid elevations. Tetracycline Pregnancy And Lactation Text: This medication is Pregnancy Category D and not consider safe during pregnancy. It is also excreted in breast milk. Acitretin Pregnancy And Lactation Text: This medication is Pregnancy Category X and should not be given to women who are pregnant or may become pregnant in the future. This medication is excreted in breast milk. Thalidomide Pregnancy And Lactation Text: This medication is Pregnancy Category X and is absolutely contraindicated during pregnancy. It is unknown if it is excreted in breast milk. Spironolactone Pregnancy And Lactation Text: This medication can cause feminization of the male fetus and should be avoided during pregnancy. The active metabolite is also found in breast milk. Terbinafine Counseling: Patient counseling regarding adverse effects of terbinafine including but not limited to headache, diarrhea, rash, upset stomach, liver function test abnormalities, itching, taste/smell disturbance, nausea, abdominal pain, and flatulence.  There is a rare possibility of liver failure that can occur when taking terbinafine.  The patient understands that a baseline LFT and kidney function test may be required. The patient verbalized understanding of the proper use and possible adverse effects of terbinafine.  All of the patient's questions and concerns were addressed. Clofazimine Pregnancy And Lactation Text: This medication is Pregnancy Category C and isn't considered safe during pregnancy. It is excreted in breast milk. Clindamycin Pregnancy And Lactation Text: This medication can be used in pregnancy if certain situations. Clindamycin is also present in breast milk. Qbrexza Pregnancy And Lactation Text: There is no available data on Qbrexza use in pregnant women.  There is no available data on Qbrexza use in lactation. Topical Sulfur Applications Counseling: Topical Sulfur Counseling: Patient counseled that this medication may cause skin irritation or allergic reactions.  In the event of skin irritation, the patient was advised to reduce the amount of the drug applied or use it less frequently.   The patient verbalized understanding of the proper use and possible adverse effects of topical sulfur application.  All of the patient's questions and concerns were addressed. Methotrexate Counseling:  Patient counseled regarding adverse effects of methotrexate including but not limited to nausea, vomiting, abnormalities in liver function tests. Patients may develop mouth sores, rash, diarrhea, and abnormalities in blood counts. The patient understands that monitoring is required including LFT's and blood counts.  There is a rare possibility of scarring of the liver and lung problems that can occur when taking methotrexate. Persistent nausea, loss of appetite, pale stools, dark urine, cough, and shortness of breath should be reported immediately. Patient advised to discontinue methotrexate treatment at least three months before attempting to become pregnant.  I discussed the need for folate supplements while taking methotrexate.  These supplements can decrease side effects during methotrexate treatment. The patient verbalized understanding of the proper use and possible adverse effects of methotrexate.  All of the patient's questions and concerns were addressed. Otezla Counseling: The side effects of Otezla were discussed with the patient, including but not limited to worsening or new depression, weight loss, diarrhea, nausea, upper respiratory tract infection, and headache. Patient instructed to call the office should any adverse effect occur.  The patient verbalized understanding of the proper use and possible adverse effects of Otezla.  All the patient's questions and concerns were addressed. Dutasteride Male Counseling: Dustasteride Counseling:  I discussed with the patient the risks of use of dutasteride including but not limited to decreased libido, decreased ejaculate volume, and gynecomastia. Women who can become pregnant should not handle medication.  All of the patient's questions and concerns were addressed. Mirvaso Counseling: Mirvaso is a topical medication which can decrease superficial blood flow where applied. Side effects are uncommon and include stinging, redness and allergic reactions. Low Dose Naltrexone Pregnancy And Lactation Text: Naltrexone is pregnancy category C.  There have been no adequate and well-controlled studies in pregnant women.  It should be used in pregnancy only if the potential benefit justifies the potential risk to the fetus.   Limited data indicates that naltrexone is minimally excreted into breastmilk. Simponi Counseling:  I discussed with the patient the risks of golimumab including but not limited to myelosuppression, immunosuppression, autoimmune hepatitis, demyelinating diseases, lymphoma, and serious infections.  The patient understands that monitoring is required including a PPD at baseline and must alert us or the primary physician if symptoms of infection or other concerning signs are noted. Zoryve Pregnancy And Lactation Text: It is unknown if this medication can cause problems during pregnancy and breastfeeding. 5-Fu Counseling: 5-Fluorouracil Counseling:  I discussed with the patient the risks of 5-fluorouracil including but not limited to erythema, scaling, itching, weeping, crusting, and pain. Dapsone Counseling: I discussed with the patient the risks of dapsone including but not limited to hemolytic anemia, agranulocytosis, rashes, methemoglobinemia, kidney failure, peripheral neuropathy, headaches, GI upset, and liver toxicity.  Patients who start dapsone require monitoring including baseline LFTs and weekly CBCs for the first month, then every month thereafter.  The patient verbalized understanding of the proper use and possible adverse effects of dapsone.  All of the patient's questions and concerns were addressed. Hyrimoz Pregnancy And Lactation Text: This medication is Pregnancy Category B and is considered safe during pregnancy. It is unknown if this medication is excreted in breast milk. Bexarotene Counseling:  I discussed with the patient the risks of bexarotene including but not limited to hair loss, dry lips/skin/eyes, liver abnormalities, hyperlipidemia, pancreatitis, depression/suicidal ideation, photosensitivity, drug rash/allergic reactions, hypothyroidism, anemia, leukopenia, infection, cataracts, and teratogenicity.  Patient understands that they will need regular blood tests to check lipid profile, liver function tests, white blood cell count, thyroid function tests and pregnancy test if applicable. Albendazole Pregnancy And Lactation Text: This medication is Pregnancy Category C and it isn't known if it is safe during pregnancy. It is also excreted in breast milk. Azelaic Acid Counseling: Patient counseled that medicine may cause skin irritation and to avoid applying near the eyes.  In the event of skin irritation, the patient was advised to reduce the amount of the drug applied or use it less frequently.   The patient verbalized understanding of the proper use and possible adverse effects of azelaic acid.  All of the patient's questions and concerns were addressed. Anxiety Quinolones Pregnancy And Lactation Text: This medication is Pregnancy Category C and it isn't know if it is safe during pregnancy. It is also excreted in breast milk. Olumiant Pregnancy And Lactation Text: Based on animal studies, Olumiant may cause embryo-fetal harm when administered to pregnant women.  The medication should not be used in pregnancy.  Breastfeeding is not recommended during treatment. Tremfya Counseling: I discussed with the patient the risks of guselkumab including but not limited to immunosuppression, serious infections, and drug reactions.  The patient understands that monitoring is required including a PPD at baseline and must alert us or the primary physician if symptoms of infection or other concerning signs are noted. Colchicine Counseling:  Patient counseled regarding adverse effects including but not limited to stomach upset (nausea, vomiting, stomach pain, or diarrhea).  Patient instructed to limit alcohol consumption while taking this medication.  Colchicine may reduce blood counts especially with prolonged use.  The patient understands that monitoring of kidney function and blood counts may be required, especially at baseline. The patient verbalized understanding of the proper use and possible adverse effects of colchicine.  All of the patient's questions and concerns were addressed. Terbinafine Pregnancy And Lactation Text: This medication is Pregnancy Category B and is considered safe during pregnancy. It is also excreted in breast milk and breast feeding isn't recommended. Doxycycline Counseling:  Patient counseled regarding possible photosensitivity and increased risk for sunburn.  Patient instructed to avoid sunlight, if possible.  When exposed to sunlight, patients should wear protective clothing, sunglasses, and sunscreen.  The patient was instructed to call the office immediately if the following severe adverse effects occur:  hearing changes, easy bruising/bleeding, severe headache, or vision changes.  The patient verbalized understanding of the proper use and possible adverse effects of doxycycline.  All of the patient's questions and concerns were addressed. Tranexamic Acid Counseling:  Patient advised of the small risk of bleeding problems with tranexamic acid. They were also instructed to call if they developed any nausea, vomiting or diarrhea. All of the patient's questions and concerns were addressed. Rhofade Counseling: Rhofade is a topical medication which can decrease superficial blood flow where applied. Side effects are uncommon and include stinging, redness and allergic reactions. Cosentyx Counseling:  I discussed with the patient the risks of Cosentyx including but not limited to worsening of Crohn's disease, immunosuppression, allergic reactions and infections.  The patient understands that monitoring is required including a PPD at baseline and must alert us or the primary physician if symptoms of infection or other concerning signs are noted. Tazorac Pregnancy And Lactation Text: This medication is not safe during pregnancy. It is unknown if this medication is excreted in breast milk. Methotrexate Pregnancy And Lactation Text: This medication is Pregnancy Category X and is known to cause fetal harm. This medication is excreted in breast milk. Fluconazole Counseling:  Patient counseled regarding adverse effects of fluconazole including but not limited to headache, diarrhea, nausea, upset stomach, liver function test abnormalities, taste disturbance, and stomach pain.  There is a rare possibility of liver failure that can occur when taking fluconazole.  The patient understands that monitoring of LFTs and kidney function test may be required, especially at baseline. The patient verbalized understanding of the proper use and possible adverse effects of fluconazole.  All of the patient's questions and concerns were addressed. Herpes zoster ophthalmicus Zyclara Counseling:  I discussed with the patient the risks of imiquimod including but not limited to erythema, scaling, itching, weeping, crusting, and pain.  Patient understands that the inflammatory response to imiquimod is variable from person to person and was educated regarded proper titration schedule.  If flu-like symptoms develop, patient knows to discontinue the medication and contact us. Topical Sulfur Applications Pregnancy And Lactation Text: This medication is considered safe during pregnancy and breast feeding secondary to limited systemic absorption. Azathioprine Counseling:  I discussed with the patient the risks of azathioprine including but not limited to myelosuppression, immunosuppression, hepatotoxicity, lymphoma, and infections.  The patient understands that monitoring is required including baseline LFTs, Creatinine, possible TPMP genotyping and weekly CBCs for the first month and then every 2 weeks thereafter.  The patient verbalized understanding of the proper use and possible adverse effects of azathioprine.  All of the patient's questions and concerns were addressed. Otezla Pregnancy And Lactation Text: This medication is Pregnancy Category C and it isn't known if it is safe during pregnancy. It is unknown if it is excreted in breast milk. Rinvoq Counseling: I discussed with the patient the risks of Rinvoq therapy including but not limited to upper respiratory tract infections, shingles, cold sores, bronchitis, nausea, cough, fever, acne, and headache. Live vaccines should be avoided.  This medication has been linked to serious infections; higher rate of mortality; malignancy and lymphoproliferative disorders; major adverse cardiovascular events; thrombosis; thrombocytopenia, anemia, and neutropenia; lipid elevations; liver enzyme elevations; and gastrointestinal perforations. Ilumya Counseling: I discussed with the patient the risks of tildrakizumab including but not limited to immunosuppression, malignancy, posterior leukoencephalopathy syndrome, and serious infections.  The patient understands that monitoring is required including a PPD at baseline and must alert us or the primary physician if symptoms of infection or other concerning signs are noted. Dutasteride Female Counseling: Dutasteride Counseling:  I discussed with the patient the risks of use of dutasteride including but not limited to decreased libido and sexual dysfunction. Explained the teratogenic nature of the medication and stressed the importance of not getting pregnant during treatment. All of the patient's questions and concerns were addressed. Hydroquinone Counseling:  Patient advised that medication may result in skin irritation, lightening (hypopigmentation), dryness, and burning.  In the event of skin irritation, the patient was advised to reduce the amount of the drug applied or use it less frequently.  Rarely, spots that are treated with hydroquinone can become darker (pseudoochronosis).  Should this occur, patient instructed to stop medication and call the office. The patient verbalized understanding of the proper use and possible adverse effects of hydroquinone.  All of the patient's questions and concerns were addressed. Niacinamide Counseling: I recommended taking niacin or niacinamide, also know as vitamin B3, twice daily. Recent evidence suggests that taking vitamin B3 (500 mg twice daily) can reduce the risk of actinic keratoses and non-melanoma skin cancers. Side effects of vitamin B3 include flushing and headache. Mirvaso Pregnancy And Lactation Text: This medication has not been assigned a Pregnancy Risk Category. It is unknown if the medication is excreted in breast milk. Xolair Pregnancy And Lactation Text: This medication is Pregnancy Category B and is considered safe during pregnancy. This medication is excreted in breast milk. Dapsone Pregnancy And Lactation Text: This medication is Pregnancy Category C and is not considered safe during pregnancy or breast feeding. Azelaic Acid Pregnancy And Lactation Text: This medication is considered safe during pregnancy and breast feeding. Bexarotene Pregnancy And Lactation Text: This medication is Pregnancy Category X and should not be given to women who are pregnant or may become pregnant. This medication should not be used if you are breast feeding. Doxycycline Pregnancy And Lactation Text: This medication is Pregnancy Category D and not consider safe during pregnancy. It is also excreted in breast milk but is considered safe for shorter treatment courses. Ivermectin Counseling:  Patient instructed to take medication on an empty stomach with a full glass of water.  Patient informed of potential adverse effects including but not limited to nausea, diarrhea, dizziness, itching, and swelling of the extremities or lymph nodes.  The patient verbalized understanding of the proper use and possible adverse effects of ivermectin.  All of the patient's questions and concerns were addressed. Rifampin Counseling: I discussed with the patient the risks of rifampin including but not limited to liver damage, kidney damage, red-orange body fluids, nausea/vomiting and severe allergy. Topical Clindamycin Counseling: Patient counseled that this medication may cause skin irritation or allergic reactions.  In the event of skin irritation, the patient was advised to reduce the amount of the drug applied or use it less frequently.   The patient verbalized understanding of the proper use and possible adverse effects of clindamycin.  All of the patient's questions and concerns were addressed. Skyrizi Counseling: I discussed with the patient the risks of risankizumab-rzaa including but not limited to immunosuppression, and serious infections.  The patient understands that monitoring is required including a PPD at baseline and must alert us or the primary physician if symptoms of infection or other concerning signs are noted. Zyclara Pregnancy And Lactation Text: This medication is Pregnancy Category C. It is unknown if this medication is excreted in breast milk. Tranexamic Acid Pregnancy And Lactation Text: It is unknown if this medication is safe during pregnancy or breast feeding. Dutasteride Pregnancy And Lactation Text: This medication is absolutely contraindicated in women, especially during pregnancy and breast feeding. Feminization of male fetuses is possible if taking while pregnant. Niacinamide Pregnancy And Lactation Text: These medications are considered safe during pregnancy. Prednisone Counseling:  I discussed with the patient the risks of prolonged use of prednisone including but not limited to weight gain, insomnia, osteoporosis, mood changes, diabetes, susceptibility to infection, glaucoma and high blood pressure.  In cases where prednisone use is prolonged, patients should be monitored with blood pressure checks, serum glucose levels and an eye exam.  Additionally, the patient may need to be placed on GI prophylaxis, PCP prophylaxis, and calcium and vitamin D supplementation and/or a bisphosphonate.  The patient verbalized understanding of the proper use and the possible adverse effects of prednisone.  All of the patient's questions and concerns were addressed. Azithromycin Counseling:  I discussed with the patient the risks of azithromycin including but not limited to GI upset, allergic reaction, drug rash, diarrhea, and yeast infections. Cimetidine Counseling:  I discussed with the patient the risks of Cimetidine including but not limited to gynecomastia, headache, diarrhea, nausea, drowsiness, arrhythmias, pancreatitis, skin rashes, psychosis, bone marrow suppression and kidney toxicity. Opzelura Counseling:  I discussed with the patient the risks of Opzelura including but not limited to nasopharngitis, bronchitis, ear infection, eosinophila, hives, diarrhea, folliculitis, tonsillitis, and rhinorrhea.  Taken orally, this medication has been linked to serious infections; higher rate of mortality; malignancy and lymphoproliferative disorders; major adverse cardiovascular events; thrombosis; thrombocytopenia, anemia, and neutropenia; and lipid elevations. Oxybutynin Counseling:  I discussed with the patient the risks of oxybutynin including but not limited to skin rash, drowsiness, dry mouth, difficulty urinating, and blurred vision. Azathioprine Pregnancy And Lactation Text: This medication is Pregnancy Category D and isn't considered safe during pregnancy. It is unknown if this medication is excreted in breast milk. Dupixent Counseling: I discussed with the patient the risks of dupilumab including but not limited to eye inflammation and irritation, cold sores, injection site reactions, allergic reactions and increased risk of parasitic infection. The patient understands that monitoring is required and they must alert us or the primary physician if symptoms of infection or other concerning signs are noted. Topical Clindamycin Pregnancy And Lactation Text: This medication is Pregnancy Category B and is considered safe during pregnancy. It is unknown if it is excreted in breast milk. Rinvoq Pregnancy And Lactation Text: Based on animal studies, Rinvoq may cause embryo-fetal harm when administered to pregnant women.  The medication should not be used in pregnancy.  Breastfeeding is not recommended during treatment and for 6 days after the last dose. Gabapentin Counseling: I discussed with the patient the risks of gabapentin including but not limited to dizziness, somnolence, fatigue and ataxia. Wartpeel Counseling:  I discussed with the patient the risks of Wartpeel including but not limited to erythema, scaling, itching, weeping, crusting, and pain. Benzoyl Peroxide Counseling: Patient counseled that medicine may cause skin irritation and bleach clothing.  In the event of skin irritation, the patient was advised to reduce the amount of the drug applied or use it less frequently.   The patient verbalized understanding of the proper use and possible adverse effects of benzoyl peroxide.  All of the patient's questions and concerns were addressed. Isotretinoin Counseling: Patient should get monthly blood tests, not donate blood, not drive at night if vision affected, not share medication, and not undergo elective surgery for 6 months after tx completed. Side effects reviewed, pt to contact office should one occur. Xolair Counseling:  Patient informed of potential adverse effects including but not limited to fever, muscle aches, rash and allergic reactions.  The patient verbalized understanding of the proper use and possible adverse effects of Xolair.  All of the patient's questions and concerns were addressed. Erythromycin Counseling:  I discussed with the patient the risks of erythromycin including but not limited to GI upset, allergic reaction, drug rash, diarrhea, increase in liver enzymes, and yeast infections. Erivedge Counseling- I discussed with the patient the risks of Erivedge including but not limited to nausea, vomiting, diarrhea, constipation, weight loss, changes in the sense of taste, decreased appetite, muscle spasms, and hair loss.  The patient verbalized understanding of the proper use and possible adverse effects of Erivedge.  All of the patient's questions and concerns were addressed. Azithromycin Pregnancy And Lactation Text: This medication is considered safe during pregnancy and is also secreted in breast milk. Griseofulvin Counseling:  I discussed with the patient the risks of griseofulvin including but not limited to photosensitivity, cytopenia, liver damage, nausea/vomiting and severe allergy.  The patient understands that this medication is best absorbed when taken with a fatty meal (e.g., ice cream or french fries). Opzelura Pregnancy And Lactation Text: There is insufficient data to evaluate drug-associated risk for major birth defects, miscarriage, or other adverse maternal or fetal outcomes.  There is a pregnancy registry that monitors pregnancy outcomes in pregnant persons exposed to the medication during pregnancy.  It is unknown if this medication is excreted in breast milk.  Do not breastfeed during treatment and for about 4 weeks after the last dose. Solaraze Counseling:  I discussed with the patient the risks of Solaraze including but not limited to erythema, scaling, itching, weeping, crusting, and pain. Valtrex Counseling: I discussed with the patient the risks of valacyclovir including but not limited to kidney damage, nausea, vomiting and severe allergy.  The patient understands that if the infection seems to be worsening or is not improving, they are to call. Finasteride Male Counseling: Finasteride Counseling:  I discussed with the patient the risks of use of finasteride including but not limited to decreased libido, decreased ejaculate volume, gynecomastia, and depression. Women should not handle medication.  All of the patient's questions and concerns were addressed. Benzoyl Peroxide Pregnancy And Lactation Text: This medication is Pregnancy Category C. It is unknown if benzoyl peroxide is excreted in breast milk. Wartpeel Pregnancy And Lactation Text: This medication is Pregnancy Category X and contraindicated in pregnancy and in women who may become pregnant. It is unknown if this medication is excreted in breast milk. Cellcept Counseling:  I discussed with the patient the risks of mycophenolate mofetil including but not limited to infection/immunosuppression, GI upset, hypokalemia, hypercholesterolemia, bone marrow suppression, lymphoproliferative disorders, malignancy, GI ulceration/bleed/perforation, colitis, interstitial lung disease, kidney failure, progressive multifocal leukoencephalopathy, and birth defects.  The patient understands that monitoring is required including a baseline creatinine and regular CBC testing. In addition, patient must alert us immediately if symptoms of infection or other concerning signs are noted. Infliximab Counseling:  I discussed with the patient the risks of infliximab including but not limited to myelosuppression, immunosuppression, autoimmune hepatitis, demyelinating diseases, lymphoma, and serious infections.  The patient understands that monitoring is required including a PPD at baseline and must alert us or the primary physician if symptoms of infection or other concerning signs are noted. Imiquimod Counseling:  I discussed with the patient the risks of imiquimod including but not limited to erythema, scaling, itching, weeping, crusting, and pain.  Patient understands that the inflammatory response to imiquimod is variable from person to person and was educated regarded proper titration schedule.  If flu-like symptoms develop, patient knows to discontinue the medication and contact us. Sotyktu Counseling:  I discussed the most common side effects of Sotyktu including: common cold, sore throat, sinus infections, cold sores, canker sores, folliculitis, and acne.  I also discussed more serious side effects of Sotyktu including but not limited to: serious allergic reactions; increased risk for infections such as TB; cancers such as lymphomas; rhabdomyolysis and elevated CPK; and elevated triglycerides and liver enzymes.  Nsaids Counseling: NSAID Counseling: I discussed with the patient that NSAIDs should be taken with food. Prolonged use of NSAIDs can result in the development of stomach ulcers.  Patient advised to stop taking NSAIDs if abdominal pain occurs.  The patient verbalized understanding of the proper use and possible adverse effects of NSAIDs.  All of the patient's questions and concerns were addressed. Erythromycin Pregnancy And Lactation Text: This medication is Pregnancy Category B and is considered safe during pregnancy. It is also excreted in breast milk. Topical Ketoconazole Counseling: Patient counseled that this medication may cause skin irritation or allergic reactions.  In the event of skin irritation, the patient was advised to reduce the amount of the drug applied or use it less frequently.   The patient verbalized understanding of the proper use and possible adverse effects of ketoconazole.  All of the patient's questions and concerns were addressed. Dupixent Pregnancy And Lactation Text: This medication likely crosses the placenta but the risk for the fetus is uncertain. This medication is excreted in breast milk. Opioid Counseling: I discussed with the patient the potential side effects of opioids including but not limited to addiction, altered mental status, and depression. I stressed avoiding alcohol, benzodiazepines, muscle relaxants and sleep aids unless specifically okayed by a physician. The patient verbalized understanding of the proper use and possible adverse effects of opioids. All of the patient's questions and concerns were addressed. They were instructed to flush the remaining pills down the toilet if they did not need them for pain. Isotretinoin Pregnancy And Lactation Text: This medication is Pregnancy Category X and is considered extremely dangerous during pregnancy. It is unknown if it is excreted in breast milk. Valtrex Pregnancy And Lactation Text: this medication is Pregnancy Category B and is considered safe during pregnancy. This medication is not directly found in breast milk but it's metabolite acyclovir is present. Bactrim Counseling:  I discussed with the patient the risks of sulfa antibiotics including but not limited to GI upset, allergic reaction, drug rash, diarrhea, dizziness, photosensitivity, and yeast infections.  Rarely, more serious reactions can occur including but not limited to aplastic anemia, agranulocytosis, methemoglobinemia, blood dyscrasias, liver or kidney failure, lung infiltrates or desquamative/blistering drug rashes. Griseofulvin Pregnancy And Lactation Text: This medication is Pregnancy Category X and is known to cause serious birth defects. It is unknown if this medication is excreted in breast milk but breast feeding should be avoided. Adbry Counseling: I discussed with the patient the risks of tralokinumab including but not limited to eye infection and irritation, cold sores, injection site reactions, worsening of asthma, allergic reactions and increased risk of parasitic infection.  Live vaccines should be avoided while taking tralokinumab. The patient understands that monitoring is required and they must alert us or the primary physician if symptoms of infection or other concerning signs are noted. Solaraze Pregnancy And Lactation Text: This medication is Pregnancy Category B and is considered safe. There is some data to suggest avoiding during the third trimester. It is unknown if this medication is excreted in breast milk. Winlevi Counseling:  I discussed with the patient the risks of topical clascoterone including but not limited to erythema, scaling, itching, and stinging. Patient voiced their understanding. Sotyktu Pregnancy And Lactation Text: There is insufficient data to evaluate whether or not Sotyktu is safe to use during pregnancy.   It is not known if Sotyktu passes into breast milk and whether or not it is safe to use when breastfeeding.   Doxepin Counseling:  Patient advised that the medication is sedating and not to drive a car after taking this medication. Patient informed of potential adverse effects including but not limited to dry mouth, urinary retention, and blurry vision.  The patient verbalized understanding of the proper use and possible adverse effects of doxepin.  All of the patient's questions and concerns were addressed. Propranolol Counseling:  I discussed with the patient the risks of propranolol including but not limited to low heart rate, low blood pressure, low blood sugar, restlessness and increased cold sensitivity. They should call the office if they experience any of these side effects. Finasteride Female Counseling: Finasteride Counseling:  I discussed with the patient the risks of use of finasteride including but not limited to decreased libido and sexual dysfunction. Explained the teratogenic nature of the medication and stressed the importance of not getting pregnant during treatment. All of the patient's questions and concerns were addressed. Nsaids Pregnancy And Lactation Text: These medications are considered safe up to 30 weeks gestation. It is excreted in breast milk. Picato Counseling:  I discussed with the patient the risks of Picato including but not limited to erythema, scaling, itching, weeping, crusting, and pain. Spevigo Counseling: I discussed with the patient the risks of Spevigo including but not limited to fatigue, nasuea, vomiting, headache, pruritus, urinary tract infection, an infusion related reactions.  The patient understands that monitoring is required including screening for tuberculosis at baseline and yearly screening thereafter while continuing Spevigo therapy. They should contact us if symptoms of infection or other concerning signs are noted. Opioid Pregnancy And Lactation Text: These medications can lead to premature delivery and should be avoided during pregnancy. These medications are also present in breast milk in small amounts. Glycopyrrolate Counseling:  I discussed with the patient the risks of glycopyrrolate including but not limited to skin rash, drowsiness, dry mouth, difficulty urinating, and blurred vision. High Dose Vitamin A Counseling: Side effects reviewed, pt to contact office should one occur. Carac Counseling:  I discussed with the patient the risks of Carac including but not limited to erythema, scaling, itching, weeping, crusting, and pain. Drysol Counseling:  I discussed with the patient the risks of drysol/aluminum chloride including but not limited to skin rash, itching, irritation, burning. Metronidazole Counseling:  I discussed with the patient the risks of metronidazole including but not limited to seizures, nausea/vomiting, a metallic taste in the mouth, nausea/vomiting and severe allergy. Rifampin Pregnancy And Lactation Text: This medication is Pregnancy Category C and it isn't know if it is safe during pregnancy. It is also excreted in breast milk and should not be used if you are breast feeding. Adbry Pregnancy And Lactation Text: It is unknown if this medication will adversely affect pregnancy or breast feeding. Soolantra Counseling: I discussed with the patients the risks of topial Soolantra. This is a medicine which decreases the number of mites and inflammation in the skin. You experience burning, stinging, eye irritation or allergic reactions.  Please call our office if you develop any problems from using this medication. Enbrel Counseling:  I discussed with the patient the risks of etanercept including but not limited to myelosuppression, immunosuppression, autoimmune hepatitis, demyelinating diseases, lymphoma, and infections.  The patient understands that monitoring is required including a PPD at baseline and must alert us or the primary physician if symptoms of infection or other concerning signs are noted. Cibinqo Counseling: I discussed with the patient the risks of Cibinqo therapy including but not limited to common cold, nausea, headache, cold sores, increased blood CPK levels, dizziness, UTIs, fatigue, acne, and vomitting. Live vaccines should be avoided.  This medication has been linked to serious infections; higher rate of mortality; malignancy and lymphoproliferative disorders; major adverse cardiovascular events; thrombosis; thrombocytopenia and lymphopenia; lipid elevations; and retinal detachment. Finasteride Pregnancy And Lactation Text: This medication is absolutely contraindicated during pregnancy. It is unknown if it is excreted in breast milk. Libtayo Counseling- I discussed with the patient the risks of Libtayo including but not limited to nausea, vomiting, diarrhea, and bone or muscle pain.  The patient verbalized understanding of the proper use and possible adverse effects of Libtayo.  All of the patient's questions and concerns were addressed. Itraconazole Counseling:  I discussed with the patient the risks of itraconazole including but not limited to liver damage, nausea/vomiting, neuropathy, and severe allergy.  The patient understands that this medication is best absorbed when taken with acidic beverages such as non-diet cola or ginger ale.  The patient understands that monitoring is required including baseline LFTs and repeat LFTs at intervals.  The patient understands that they are to contact us or the primary physician if concerning signs are noted. Spevigo Pregnancy And Lactation Text: The risk during pregnancy and breastfeeding is uncertain with this medication. This medication does cross the placenta. It is unknown if this medication is found in breast milk. Bactrim Pregnancy And Lactation Text: This medication is Pregnancy Category D and is known to cause fetal risk.  It is also excreted in breast milk. Propranolol Pregnancy And Lactation Text: This medication is Pregnancy Category C and it isn't known if it is safe during pregnancy. It is excreted in breast milk. Cyclophosphamide Counseling:  I discussed with the patient the risks of cyclophosphamide including but not limited to hair loss, hormonal abnormalities, decreased fertility, abdominal pain, diarrhea, nausea and vomiting, bone marrow suppression and infection. The patient understands that monitoring is required while taking this medication. Xeljanz Counseling: I discussed with the patient the risks of Xeljanz therapy including increased risk of infection, liver issues, headache, diarrhea, or cold symptoms. Live vaccines should be avoided. They were instructed to call if they have any problems. Olanzapine Counseling- I discussed with the patient the common side effects of olanzapine including but are not limited to: lack of energy, dry mouth, increased appetite, sleepiness, tremor, constipation, dizziness, changes in behavior, or restlessness.  Explained that teenagers are more likely to experience headaches, abdominal pain, pain in the arms or legs, tiredness, and sleepiness.  Serious side effects include but are not limited: increased risk of death in elderly patients who are confused, have memory loss, or dementia-related psychosis; hyperglycemia; increased cholesterol and triglycerides; and weight gain. Klisyri Counseling:  I discussed with the patient the risks of Klisyri including but not limited to erythema, scaling, itching, weeping, crusting, and pain. Doxepin Pregnancy And Lactation Text: This medication is Pregnancy Category C and it isn't known if it is safe during pregnancy. It is also excreted in breast milk and breast feeding isn't recommended. Glycopyrrolate Pregnancy And Lactation Text: This medication is Pregnancy Category B and is considered safe during pregnancy. It is unknown if it is excreted breast milk. Rituxan Counseling:  I discussed with the patient the risks of Rituxan infusions. Side effects can include infusion reactions, severe drug rashes including mucocutaneous reactions, reactivation of latent hepatitis and other infections and rarely progressive multifocal leukoencephalopathy.  All of the patient's questions and concerns were addressed. Winlevi Pregnancy And Lactation Text: This medication is considered safe during pregnancy and breastfeeding. Metronidazole Pregnancy And Lactation Text: This medication is Pregnancy Category B and considered safe during pregnancy.  It is also excreted in breast milk. High Dose Vitamin A Pregnancy And Lactation Text: High dose vitamin A therapy is contraindicated during pregnancy and breast feeding. Topical Metronidazole Counseling: Metronidazole is a topical antibiotic medication. You may experience burning, stinging, redness, or allergic reactions.  Please call our office if you develop any problems from using this medication. Cibinqo Pregnancy And Lactation Text: It is unknown if this medication will adversely affect pregnancy or breast feeding.  You should not take this medication if you are currently pregnant or planning a pregnancy or while breastfeeding. Stelara Counseling:  I discussed with the patient the risks of ustekinumab including but not limited to immunosuppression, malignancy, posterior leukoencephalopathy syndrome, and serious infections.  The patient understands that monitoring is required including a PPD at baseline and must alert us or the primary physician if symptoms of infection or other concerning signs are noted. Libtayo Pregnancy And Lactation Text: This medication is contraindicated in pregnancy and when breast feeding. Arava Counseling:  Patient counseled regarding adverse effects of Arava including but not limited to nausea, vomiting, abnormalities in liver function tests. Patients may develop mouth sores, rash, diarrhea, and abnormalities in blood counts. The patient understands that monitoring is required including LFTs and blood counts.  There is a rare possibility of scarring of the liver and lung problems that can occur when taking methotrexate. Persistent nausea, loss of appetite, pale stools, dark urine, cough, and shortness of breath should be reported immediately. Patient advised to discontinue Arava treatment and consult with a physician prior to attempting conception. The patient will have to undergo a treatment to eliminate Arava from the body prior to conception. Cephalexin Counseling: I counseled the patient regarding use of cephalexin as an antibiotic for prophylactic and/or therapeutic purposes. Cephalexin (commonly prescribed under brand name Keflex) is a cephalosporin antibiotic which is active against numerous classes of bacteria, including most skin bacteria. Side effects may include nausea, diarrhea, gastrointestinal upset, rash, hives, yeast infections, and in rare cases, hepatitis, kidney disease, seizures, fever, confusion, neurologic symptoms, and others. Patients with severe allergies to penicillin medications are cautioned that there is about a 10% incidence of cross-reactivity with cephalosporins. When possible, patients with penicillin allergies should use alternatives to cephalosporins for antibiotic therapy. Bimzelx Counseling:  I discussed with the patient the risks of Bimzelx including but not limited to depression, immunosuppression, allergic reactions and infections.  The patient understands that monitoring is required including a PPD at baseline and must alert us or the primary physician if symptoms of infection or other concerning signs are noted. SSKI Counseling:  I discussed with the patient the risks of SSKI including but not limited to thyroid abnormalities, metallic taste, GI upset, fever, headache, acne, arthralgias, paraesthesias, lymphadenopathy, easy bleeding, arrhythmias, and allergic reaction. Sarecycline Counseling: Patient advised regarding possible photosensitivity and discoloration of the teeth, skin, lips, tongue and gums.  Patient instructed to avoid sunlight, if possible.  When exposed to sunlight, patients should wear protective clothing, sunglasses, and sunscreen.  The patient was instructed to call the office immediately if the following severe adverse effects occur:  hearing changes, easy bruising/bleeding, severe headache, or vision changes.  The patient verbalized understanding of the proper use and possible adverse effects of sarecycline.  All of the patient's questions and concerns were addressed. Soolantra Pregnancy And Lactation Text: This medication is Pregnancy Category C. This medication is considered safe during breast feeding. Birth Control Pills Counseling: Birth Control Pill Counseling: I discussed with the patient the potential side effects of OCPs including but not limited to increased risk of stroke, heart attack, thrombophlebitis, deep venous thrombosis, hepatic adenomas, breast changes, GI upset, headaches, and depression.  The patient verbalized understanding of the proper use and possible adverse effects of OCPs. All of the patient's questions and concerns were addressed. Olanzapine Pregnancy And Lactation Text: This medication is pregnancy category C.   There are no adequate and well controlled trials with olanzapine in pregnant females.  Olanzapine should be used during pregnancy only if the potential benefit justifies the potential risk to the fetus.   In a study in lactating healthy women, olanzapine was excreted in breast milk.  It is recommended that women taking olanzapine should not breast feed. Calcipotriene Counseling:  I discussed with the patient the risks of calcipotriene including but not limited to erythema, scaling, itching, and irritation. Hydroxyzine Counseling: Patient advised that the medication is sedating and not to drive a car after taking this medication.  Patient informed of potential adverse effects including but not limited to dry mouth, urinary retention, and blurry vision.  The patient verbalized understanding of the proper use and possible adverse effects of hydroxyzine.  All of the patient's questions and concerns were addressed. Protopic Counseling: Patient may experience a mild burning sensation during topical application. Protopic is not approved in children less than 2 years of age. There have been case reports of hematologic and skin malignancies in patients using topical calcineurin inhibitors although causality is questionable. VTAMA Counseling: I discussed with the patient that VTAMA is not for use in the eyes, mouth or mouth. They should call the office if they develop any signs of allergic reactions to VTAMA. The patient verbalized understanding of the proper use and possible adverse effects of VTAMA.  All of the patient's questions and concerns were addressed. Cyclophosphamide Pregnancy And Lactation Text: This medication is Pregnancy Category D and it isn't considered safe during pregnancy. This medication is excreted in breast milk. Litfulo Counseling: I discussed with the patient the risks of Litfulo therapy including but not limited to upper respiratory tract infections, shingles, cold sores, and nausea. Live vaccines should be avoided.  This medication has been linked to serious infections; higher rate of mortality; malignancy and lymphoproliferative disorders; major adverse cardiovascular events; thrombosis; gastrointestinal perforations; neutropenia; lymphopenia; anemia; liver enzyme elevations; and lipid elevations. Elidel Counseling: Patient may experience a mild burning sensation during topical application. Elidel is not approved in children less than 2 years of age. There have been case reports of hematologic and skin malignancies in patients using topical calcineurin inhibitors although causality is questionable. Hydroxychloroquine Counseling:  I discussed with the patient that a baseline ophthalmologic exam is needed at the start of therapy and every year thereafter while on therapy. A CBC may also be warranted for monitoring.  The side effects of this medication were discussed with the patient, including but not limited to agranulocytosis, aplastic anemia, seizures, rashes, retinopathy, and liver toxicity. Patient instructed to call the office should any adverse effect occur.  The patient verbalized understanding of the proper use and possible adverse effects of Plaquenil.  All the patient's questions and concerns were addressed. Humira Counseling:  I discussed with the patient the risks of adalimumab including but not limited to myelosuppression, immunosuppression, autoimmune hepatitis, demyelinating diseases, lymphoma, and serious infections.  The patient understands that monitoring is required including a PPD at baseline and must alert us or the primary physician if symptoms of infection or other concerning signs are noted. Topical Metronidazole Pregnancy And Lactation Text: This medication is Pregnancy Category B and considered safe during pregnancy.  It is also considered safe to use while breastfeeding. Klisyri Pregnancy And Lactation Text: It is unknown if this medication can harm a developing fetus or if it is excreted in breast milk. Xelelizabethz Pregnancy And Lactation Text: This medication is Pregnancy Category D and is not considered safe during pregnancy.  The risk during breast feeding is also uncertain. Rituxan Pregnancy And Lactation Text: This medication is Pregnancy Category C and it isn't know if it is safe during pregnancy. It is unknown if this medication is excreted in breast milk but similar antibodies are known to be excreted. Cephalexin Pregnancy And Lactation Text: This medication is Pregnancy Category B and considered safe during pregnancy.  It is also excreted in breast milk but can be used safely for shorter doses. Odomzo Counseling- I discussed with the patient the risks of Odomzo including but not limited to nausea, vomiting, diarrhea, constipation, weight loss, changes in the sense of taste, decreased appetite, muscle spasms, and hair loss.  The patient verbalized understanding of the proper use and possible adverse effects of Odomzo.  All of the patient's questions and concerns were addressed. Minocycline Counseling: Patient advised regarding possible photosensitivity and discoloration of the teeth, skin, lips, tongue and gums.  Patient instructed to avoid sunlight, if possible.  When exposed to sunlight, patients should wear protective clothing, sunglasses, and sunscreen.  The patient was instructed to call the office immediately if the following severe adverse effects occur:  hearing changes, easy bruising/bleeding, severe headache, or vision changes.  The patient verbalized understanding of the proper use and possible adverse effects of minocycline.  All of the patient's questions and concerns were addressed. Detail Level: Zone Ketoconazole Counseling:   Patient counseled regarding improving absorption with orange juice.  Adverse effects include but are not limited to breast enlargement, headache, diarrhea, nausea, upset stomach, liver function test abnormalities, taste disturbance, and stomach pain.  There is a rare possibility of liver failure that can occur when taking ketoconazole. The patient understands that monitoring of LFTs may be required, especially at baseline. The patient verbalized understanding of the proper use and possible adverse effects of ketoconazole.  All of the patient's questions and concerns were addressed. Sski Pregnancy And Lactation Text: This medication is Pregnancy Category D and isn't considered safe during pregnancy. It is excreted in breast milk. Hydroxyzine Pregnancy And Lactation Text: This medication is not safe during pregnancy and should not be taken. It is also excreted in breast milk and breast feeding isn't recommended. Protopic Pregnancy And Lactation Text: This medication is Pregnancy Category C. It is unknown if this medication is excreted in breast milk when applied topically. Bimzelx Pregnancy And Lactation Text: This medication crosses the placenta and the safety is uncertain during pregnancy. It is unknown if this medication is present in breast milk. Topical Retinoid counseling:  Patient advised to apply a pea-sized amount only at bedtime and wait 30 minutes after washing their face before applying.  If too drying, patient may add a non-comedogenic moisturizer. The patient verbalized understanding of the proper use and possible adverse effects of retinoids.  All of the patient's questions and concerns were addressed. Birth Control Pills Pregnancy And Lactation Text: This medication should be avoided if pregnant and for the first 30 days post-partum. Hydroxychloroquine Pregnancy And Lactation Text: This medication has been shown to cause fetal harm but it isn't assigned a Pregnancy Risk Category. There are small amounts excreted in breast milk. Calcipotriene Pregnancy And Lactation Text: The use of this medication during pregnancy or lactation is not recommended as there is insufficient data. Cyclosporine Counseling:  I discussed with the patient the risks of cyclosporine including but not limited to hypertension, gingival hyperplasia,myelosuppression, immunosuppression, liver damage, kidney damage, neurotoxicity, lymphoma, and serious infections. The patient understands that monitoring is required including baseline blood pressure, CBC, CMP, lipid panel and uric acid, and then 1-2 times monthly CMP and blood pressure. Siliq Counseling:  I discussed with the patient the risks of Siliq including but not limited to new or worsening depression, suicidal thoughts and behavior, immunosuppression, malignancy, posterior leukoencephalopathy syndrome, and serious infections.  The patient understands that monitoring is required including a PPD at baseline and must alert us or the primary physician if symptoms of infection or other concerning signs are noted. There is also a special program designed to monitor depression which is required with Siliq. Minoxidil Counseling: Minoxidil is a topical medication which can increase blood flow where it is applied. It is uncertain how this medication increases hair growth. Side effects are uncommon and include stinging and allergic reactions. Oral Minoxidil Counseling- I discussed with the patient the risks of oral minoxidil including but not limited to shortness of breath, swelling of the feet or ankles, dizziness, lightheadedness, unwanted hair growth and allergic reaction.  The patient verbalized understanding of the proper use and possible adverse effects of oral minoxidil.  All of the patient's questions and concerns were addressed. Tetracycline Counseling: Patient counseled regarding possible photosensitivity and increased risk for sunburn.  Patient instructed to avoid sunlight, if possible.  When exposed to sunlight, patients should wear protective clothing, sunglasses, and sunscreen.  The patient was instructed to call the office immediately if the following severe adverse effects occur:  hearing changes, easy bruising/bleeding, severe headache, or vision changes.  The patient verbalized understanding of the proper use and possible adverse effects of tetracycline.  All of the patient's questions and concerns were addressed. Patient understands to avoid pregnancy while on therapy due to potential birth defects. Litfulo Pregnancy And Lactation Text: Based on animal studies, Lifulo may cause embryo-fetal harm when administered to pregnant women.  The medication should not be used in pregnancy.  Breastfeeding is not recommended during treatment. Topical Steroids Counseling: I discussed with the patient that prolonged use of topical steroids can result in the increased appearance of superficial blood vessels (telangiectasias), lightening (hypopigmentation) and thinning of the skin (atrophy).  Patient understands to avoid using high potency steroids in skin folds, the groin or the face.  The patient verbalized understanding of the proper use and possible adverse effects of topical steroids.  All of the patient's questions and concerns were addressed. Aklief counseling:  Patient advised to apply a pea-sized amount only at bedtime and wait 30 minutes after washing their face before applying.  If too drying, patient may add a non-comedogenic moisturizer.  The most commonly reported side effects including irritation, redness, scaling, dryness, stinging, burning, itching, and increased risk of sunburn.  The patient verbalized understanding of the proper use and possible adverse effects of retinoids.  All of the patient's questions and concerns were addressed. Cimzia Counseling:  I discussed with the patient the risks of Cimzia including but not limited to immunosuppression, allergic reactions and infections.  The patient understands that monitoring is required including a PPD at baseline and must alert us or the primary physician if symptoms of infection or other concerning signs are noted. Ketoconazole Pregnancy And Lactation Text: This medication is Pregnancy Category C and it isn't know if it is safe during pregnancy. It is also excreted in breast milk and breast feeding isn't recommended. Acitretin Counseling:  I discussed with the patient the risks of acitretin including but not limited to hair loss, dry lips/skin/eyes, liver damage, hyperlipidemia, depression/suicidal ideation, photosensitivity.  Serious rare side effects can include but are not limited to pancreatitis, pseudotumor cerebri, bony changes, clot formation/stroke/heart attack.  Patient understands that alcohol is contraindicated since it can result in liver toxicity and significantly prolong the elimination of the drug by many years. Taltz Counseling: I discussed with the patient the risks of ixekizumab including but not limited to immunosuppression, serious infections, worsening of inflammatory bowel disease and drug reactions.  The patient understands that monitoring is required including a PPD at baseline and must alert us or the primary physician if symptoms of infection or other concerning signs are noted.

## 2024-11-15 NOTE — PATIENT PROFILE ADULT - LEGAL HELP
Received order for picc line for 28 days total of IV ancef  Will have unit RN clarify if patient could have a midline rather than a picc  Will place line when clarification is received.     no